# Patient Record
Sex: MALE | Race: WHITE | Employment: OTHER | URBAN - NONMETROPOLITAN AREA
[De-identification: names, ages, dates, MRNs, and addresses within clinical notes are randomized per-mention and may not be internally consistent; named-entity substitution may affect disease eponyms.]

---

## 2018-06-08 ENCOUNTER — HOSPITAL ENCOUNTER (OUTPATIENT)
Dept: PREADMISSION TESTING | Age: 68
Discharge: HOME | End: 2018-06-12
Attending: ORTHOPAEDIC SURGERY
Payer: MEDICARE

## 2018-06-08 VITALS
WEIGHT: 222.2 LBS | TEMPERATURE: 97.3 F | SYSTOLIC BLOOD PRESSURE: 152 MMHG | OXYGEN SATURATION: 96 % | BODY MASS INDEX: 34.88 KG/M2 | HEIGHT: 67 IN | HEART RATE: 100 BPM | RESPIRATION RATE: 20 BRPM | DIASTOLIC BLOOD PRESSURE: 79 MMHG

## 2018-06-08 LAB
ABSOLUTE EOS #: 0.26 K/UL (ref 0–0.44)
ABSOLUTE IMMATURE GRANULOCYTE: 0.06 K/UL (ref 0–0.3)
ABSOLUTE LYMPH #: 2.24 K/UL (ref 1.1–3.7)
ABSOLUTE MONO #: 0.96 K/UL (ref 0.1–1.2)
ANION GAP SERPL CALCULATED.3IONS-SCNC: 10 MMOL/L (ref 9–17)
BASOPHILS # BLD: 1 % (ref 0–2)
BASOPHILS ABSOLUTE: 0.09 K/UL (ref 0–0.2)
BUN BLDV-MCNC: 14 MG/DL (ref 8–23)
BUN/CREAT BLD: 18 (ref 9–20)
CALCIUM SERPL-MCNC: 9.7 MG/DL (ref 8.6–10.4)
CHLORIDE BLD-SCNC: 103 MMOL/L (ref 98–107)
CO2: 28 MMOL/L (ref 20–31)
CREAT SERPL-MCNC: 0.8 MG/DL (ref 0.7–1.2)
DIFFERENTIAL TYPE: ABNORMAL
EKG ATRIAL RATE: 80 BPM
EKG P AXIS: 74 DEGREES
EKG P-R INTERVAL: 134 MS
EKG Q-T INTERVAL: 360 MS
EKG QRS DURATION: 86 MS
EKG QTC CALCULATION (BAZETT): 415 MS
EKG R AXIS: 47 DEGREES
EKG T AXIS: 60 DEGREES
EKG VENTRICULAR RATE: 80 BPM
EOSINOPHILS RELATIVE PERCENT: 2 % (ref 1–4)
GFR AFRICAN AMERICAN: >60 ML/MIN
GFR NON-AFRICAN AMERICAN: >60 ML/MIN
GFR SERPL CREATININE-BSD FRML MDRD: ABNORMAL ML/MIN/{1.73_M2}
GFR SERPL CREATININE-BSD FRML MDRD: ABNORMAL ML/MIN/{1.73_M2}
GLUCOSE BLD-MCNC: 164 MG/DL (ref 70–99)
HCT VFR BLD CALC: 42.5 % (ref 40.7–50.3)
HEMOGLOBIN: 13.6 G/DL (ref 13–17)
IMMATURE GRANULOCYTES: 1 %
LYMPHOCYTES # BLD: 19 % (ref 24–43)
MCH RBC QN AUTO: 31.3 PG (ref 25.2–33.5)
MCHC RBC AUTO-ENTMCNC: 32 G/DL (ref 28.4–34.8)
MCV RBC AUTO: 97.7 FL (ref 82.6–102.9)
MONOCYTES # BLD: 8 % (ref 3–12)
MRSA, DNA, NASAL: NORMAL
NRBC AUTOMATED: 0.3 PER 100 WBC
PDW BLD-RTO: 14.6 % (ref 11.8–14.4)
PLATELET # BLD: 311 K/UL (ref 138–453)
PLATELET ESTIMATE: ABNORMAL
PMV BLD AUTO: 10 FL (ref 8.1–13.5)
POTASSIUM SERPL-SCNC: 4.9 MMOL/L (ref 3.7–5.3)
RBC # BLD: 4.35 M/UL (ref 4.21–5.77)
RBC # BLD: ABNORMAL 10*6/UL
SEG NEUTROPHILS: 69 % (ref 36–65)
SEGMENTED NEUTROPHILS ABSOLUTE COUNT: 8.12 K/UL (ref 1.5–8.1)
SODIUM BLD-SCNC: 141 MMOL/L (ref 135–144)
SPECIMEN DESCRIPTION: NORMAL
WBC # BLD: 11.7 K/UL (ref 3.5–11.3)
WBC # BLD: ABNORMAL 10*3/UL

## 2018-06-08 PROCEDURE — 87641 MR-STAPH DNA AMP PROBE: CPT

## 2018-06-08 PROCEDURE — 93005 ELECTROCARDIOGRAM TRACING: CPT

## 2018-06-08 PROCEDURE — 36415 COLL VENOUS BLD VENIPUNCTURE: CPT

## 2018-06-08 PROCEDURE — 85025 COMPLETE CBC W/AUTO DIFF WBC: CPT

## 2018-06-08 PROCEDURE — 80048 BASIC METABOLIC PNL TOTAL CA: CPT

## 2018-07-10 ENCOUNTER — HOSPITAL ENCOUNTER (OUTPATIENT)
Dept: PHYSICAL THERAPY | Age: 68
Setting detail: THERAPIES SERIES
Discharge: HOME | End: 2018-07-10
Payer: MEDICARE

## 2018-07-10 ENCOUNTER — HOSPITAL ENCOUNTER (OUTPATIENT)
Dept: LAB | Age: 68
Discharge: HOME | DRG: 470 | End: 2018-07-10
Payer: MEDICARE

## 2018-07-10 LAB
ABO/RH: NORMAL
ANTIBODY SCREEN: NEGATIVE
ARM BAND NUMBER: NORMAL
EXPIRATION DATE: NORMAL

## 2018-07-10 PROCEDURE — G8979 MOBILITY GOAL STATUS: HCPCS

## 2018-07-10 PROCEDURE — 86901 BLOOD TYPING SEROLOGIC RH(D): CPT

## 2018-07-10 PROCEDURE — 86900 BLOOD TYPING SEROLOGIC ABO: CPT

## 2018-07-10 PROCEDURE — 86850 RBC ANTIBODY SCREEN: CPT

## 2018-07-10 PROCEDURE — 97116 GAIT TRAINING THERAPY: CPT

## 2018-07-10 PROCEDURE — G8980 MOBILITY D/C STATUS: HCPCS

## 2018-07-10 PROCEDURE — G8978 MOBILITY CURRENT STATUS: HCPCS

## 2018-07-10 PROCEDURE — 36415 COLL VENOUS BLD VENIPUNCTURE: CPT

## 2018-07-10 PROCEDURE — 97110 THERAPEUTIC EXERCISES: CPT

## 2018-07-10 NOTE — PROGRESS NOTES
Phone: [phone]        Fax: [phone] [facility]  Physical Therapy  Gait Training/Discharge Summary  Date: 7/10/2018    Patient Name: Coleman Lozada          : 1950  (88 y.o.)    Attending Physician: Deacon Nuno MD    Diagnosis: Primary osteoarthritis of left knee (M17.12)    Reason for Referral:  [] Crutch Training   [x] Scharlene Minors Training   [] Other:    Objective Assessment:    [x]  Strength of uninvolved extremities are all within functional limits   []  Other:       Weight Bearing Status:  []Right Extremity  [x]Left Extremity  []NWB  []PWB    [x]WBAT  []TTWB    Treatment:  [x] Instructed in appropriate gait with crutches/walker  [x]   Crutches/walker fitted to patient at accurate height  [x] Instructed on gait training at level ground  [x] Instructed on gait training with use of stairs  [x] Instructed on sit to stand utilizing crutches/walker  []   Other:     Home Exercise Program - Instructed Patient:   [x] Handout given regarding LE ROM / strengthening exercises      Treatment Goals:  [x]  Met []  Not Met 7/10/2018   [x] Patient will be independent crutch/walker training    Treatment Plan:   [x]  Gait training, as noted above    [x]  Exercise education, as stated above     Rehab Potential: []  Poor   []  Fair   [x]  Good   []  Excellent     If there are any questions regarding the plan of care, please do not hesitate to contact the center.    Thank you for your referral.      Therapists Signature:  Savita Kaye PT, DPT                              Date: 7/10/2018        To be completed by the referring physicians   [] Approve the treatment plan as indicated   []  Comments:   Physicians Signature:                        Date: 7/10/2018

## 2018-07-11 ENCOUNTER — ANESTHESIA EVENT (OUTPATIENT)
Dept: OPERATING ROOM | Age: 68
DRG: 470 | End: 2018-07-11
Payer: MEDICARE

## 2018-07-12 ENCOUNTER — HOSPITAL ENCOUNTER (INPATIENT)
Age: 68
LOS: 1 days | Discharge: HOME | DRG: 470 | End: 2018-07-13
Attending: ORTHOPAEDIC SURGERY | Admitting: ORTHOPAEDIC SURGERY
Payer: MEDICARE

## 2018-07-12 ENCOUNTER — ANESTHESIA (OUTPATIENT)
Dept: OPERATING ROOM | Age: 68
DRG: 470 | End: 2018-07-12
Payer: MEDICARE

## 2018-07-12 VITALS — SYSTOLIC BLOOD PRESSURE: 110 MMHG | DIASTOLIC BLOOD PRESSURE: 63 MMHG | OXYGEN SATURATION: 93 % | TEMPERATURE: 98 F

## 2018-07-12 DIAGNOSIS — Z96.651 STATUS POST TOTAL KNEE REPLACEMENT, RIGHT: Primary | ICD-10-CM

## 2018-07-12 PROBLEM — M17.12 PRIMARY OSTEOARTHRITIS OF LEFT KNEE: Status: ACTIVE | Noted: 2018-07-12

## 2018-07-12 PROCEDURE — 6370000000 HC RX 637 (ALT 250 FOR IP): Performed by: ORTHOPAEDIC SURGERY

## 2018-07-12 PROCEDURE — 2500000003 HC RX 250 WO HCPCS: Performed by: NURSE ANESTHETIST, CERTIFIED REGISTERED

## 2018-07-12 PROCEDURE — 97116 GAIT TRAINING THERAPY: CPT

## 2018-07-12 PROCEDURE — 2580000003 HC RX 258: Performed by: ORTHOPAEDIC SURGERY

## 2018-07-12 PROCEDURE — 3700000001 HC ADD 15 MINUTES (ANESTHESIA): Performed by: ORTHOPAEDIC SURGERY

## 2018-07-12 PROCEDURE — 3600000005 HC SURGERY LEVEL 5 BASE: Performed by: ORTHOPAEDIC SURGERY

## 2018-07-12 PROCEDURE — 3600000015 HC SURGERY LEVEL 5 ADDTL 15MIN: Performed by: ORTHOPAEDIC SURGERY

## 2018-07-12 PROCEDURE — 2580000003 HC RX 258: Performed by: NURSE ANESTHETIST, CERTIFIED REGISTERED

## 2018-07-12 PROCEDURE — 6360000002 HC RX W HCPCS: Performed by: ORTHOPAEDIC SURGERY

## 2018-07-12 PROCEDURE — 7100000000 HC PACU RECOVERY - FIRST 15 MIN: Performed by: ORTHOPAEDIC SURGERY

## 2018-07-12 PROCEDURE — G8978 MOBILITY CURRENT STATUS: HCPCS

## 2018-07-12 PROCEDURE — 6360000002 HC RX W HCPCS: Performed by: NURSE ANESTHETIST, CERTIFIED REGISTERED

## 2018-07-12 PROCEDURE — 2500000003 HC RX 250 WO HCPCS: Performed by: ORTHOPAEDIC SURGERY

## 2018-07-12 PROCEDURE — 7100000001 HC PACU RECOVERY - ADDTL 15 MIN: Performed by: ORTHOPAEDIC SURGERY

## 2018-07-12 PROCEDURE — 1200000000 HC SEMI PRIVATE

## 2018-07-12 PROCEDURE — G8979 MOBILITY GOAL STATUS: HCPCS

## 2018-07-12 PROCEDURE — 3700000000 HC ANESTHESIA ATTENDED CARE: Performed by: ORTHOPAEDIC SURGERY

## 2018-07-12 PROCEDURE — C1713 ANCHOR/SCREW BN/BN,TIS/BN: HCPCS | Performed by: ORTHOPAEDIC SURGERY

## 2018-07-12 PROCEDURE — 97161 PT EVAL LOW COMPLEX 20 MIN: CPT

## 2018-07-12 PROCEDURE — C1776 JOINT DEVICE (IMPLANTABLE): HCPCS | Performed by: ORTHOPAEDIC SURGERY

## 2018-07-12 PROCEDURE — 0SRD0J9 REPLACEMENT OF LEFT KNEE JOINT WITH SYNTHETIC SUBSTITUTE, CEMENTED, OPEN APPROACH: ICD-10-PCS | Performed by: ORTHOPAEDIC SURGERY

## 2018-07-12 DEVICE — IMPLANTABLE DEVICE: Type: IMPLANTABLE DEVICE | Site: KNEE | Status: FUNCTIONAL

## 2018-07-12 DEVICE — DISCONTINUED USE 416978 CEMENT PALACOS R SING DOSE 40GR: Type: IMPLANTABLE DEVICE | Site: KNEE | Status: FUNCTIONAL

## 2018-07-12 DEVICE — DISCONTINUED USE 415964 CEMENT PALACOS R + G SING DOSE 40GR: Type: IMPLANTABLE DEVICE | Site: KNEE | Status: FUNCTIONAL

## 2018-07-12 DEVICE — IMPL KNEE PSN FEM CR CMT CCR STD SZ9 L: Type: IMPLANTABLE DEVICE | Site: KNEE | Status: FUNCTIONAL

## 2018-07-12 DEVICE — DUP USE 339820 IMPL KNEE PSN TIB STM 5 DEG SZ G L: Type: IMPLANTABLE DEVICE | Site: KNEE | Status: FUNCTIONAL

## 2018-07-12 RX ORDER — DIMENHYDRINATE 50 MG/1
50 TABLET ORAL ONCE
Status: COMPLETED | OUTPATIENT
Start: 2018-07-12 | End: 2018-07-12

## 2018-07-12 RX ORDER — MELOXICAM 15 MG/1
15 TABLET ORAL DAILY
Status: DISCONTINUED | OUTPATIENT
Start: 2018-07-13 | End: 2018-07-13 | Stop reason: HOSPADM

## 2018-07-12 RX ORDER — ACETAMINOPHEN 325 MG/1
650 TABLET ORAL EVERY 6 HOURS
Status: DISCONTINUED | OUTPATIENT
Start: 2018-07-12 | End: 2018-07-13 | Stop reason: HOSPADM

## 2018-07-12 RX ORDER — ASPIRIN 81 MG/1
81 TABLET, CHEWABLE ORAL 2 TIMES DAILY
Status: DISCONTINUED | OUTPATIENT
Start: 2018-07-12 | End: 2018-07-13 | Stop reason: HOSPADM

## 2018-07-12 RX ORDER — PREGABALIN 75 MG/1
75 CAPSULE ORAL ONCE
Status: COMPLETED | OUTPATIENT
Start: 2018-07-12 | End: 2018-07-12

## 2018-07-12 RX ORDER — SODIUM CHLORIDE 0.9 % (FLUSH) 0.9 %
10 SYRINGE (ML) INJECTION EVERY 12 HOURS SCHEDULED
Status: DISCONTINUED | OUTPATIENT
Start: 2018-07-12 | End: 2018-07-13 | Stop reason: HOSPADM

## 2018-07-12 RX ORDER — SODIUM CHLORIDE, SODIUM LACTATE, POTASSIUM CHLORIDE, CALCIUM CHLORIDE 600; 310; 30; 20 MG/100ML; MG/100ML; MG/100ML; MG/100ML
INJECTION, SOLUTION INTRAVENOUS CONTINUOUS
Status: DISCONTINUED | OUTPATIENT
Start: 2018-07-12 | End: 2018-07-12

## 2018-07-12 RX ORDER — DOCUSATE SODIUM 100 MG/1
100 CAPSULE, LIQUID FILLED ORAL 2 TIMES DAILY
Status: DISCONTINUED | OUTPATIENT
Start: 2018-07-12 | End: 2018-07-13 | Stop reason: HOSPADM

## 2018-07-12 RX ORDER — PREGABALIN 75 MG/1
75 CAPSULE ORAL 2 TIMES DAILY
Status: DISCONTINUED | OUTPATIENT
Start: 2018-07-12 | End: 2018-07-13 | Stop reason: HOSPADM

## 2018-07-12 RX ORDER — TRANEXAMIC ACID 650 1/1
1300 TABLET ORAL ONCE
Status: COMPLETED | OUTPATIENT
Start: 2018-07-12 | End: 2018-07-12

## 2018-07-12 RX ORDER — OXYCODONE HYDROCHLORIDE 5 MG/1
10 TABLET ORAL EVERY 4 HOURS PRN
Status: DISCONTINUED | OUTPATIENT
Start: 2018-07-12 | End: 2018-07-13 | Stop reason: HOSPADM

## 2018-07-12 RX ORDER — ONDANSETRON 4 MG/1
4 TABLET, ORALLY DISINTEGRATING ORAL EVERY 30 MIN PRN
Status: DISCONTINUED | OUTPATIENT
Start: 2018-07-12 | End: 2018-07-12

## 2018-07-12 RX ORDER — SODIUM CHLORIDE 0.9 % (FLUSH) 0.9 %
10 SYRINGE (ML) INJECTION PRN
Status: DISCONTINUED | OUTPATIENT
Start: 2018-07-12 | End: 2018-07-13 | Stop reason: HOSPADM

## 2018-07-12 RX ORDER — SODIUM CHLORIDE, SODIUM LACTATE, POTASSIUM CHLORIDE, CALCIUM CHLORIDE 600; 310; 30; 20 MG/100ML; MG/100ML; MG/100ML; MG/100ML
INJECTION, SOLUTION INTRAVENOUS CONTINUOUS
Status: DISCONTINUED | OUTPATIENT
Start: 2018-07-12 | End: 2018-07-13 | Stop reason: HOSPADM

## 2018-07-12 RX ORDER — ACETAMINOPHEN 325 MG/1
650 TABLET ORAL ONCE
Status: DISCONTINUED | OUTPATIENT
Start: 2018-07-12 | End: 2018-07-12 | Stop reason: SDUPTHER

## 2018-07-12 RX ORDER — PROPOFOL 10 MG/ML
INJECTION, EMULSION INTRAVENOUS PRN
Status: DISCONTINUED | OUTPATIENT
Start: 2018-07-12 | End: 2018-07-12 | Stop reason: SDUPTHER

## 2018-07-12 RX ORDER — FENTANYL CITRATE 50 UG/ML
50 INJECTION, SOLUTION INTRAMUSCULAR; INTRAVENOUS EVERY 5 MIN PRN
Status: DISCONTINUED | OUTPATIENT
Start: 2018-07-12 | End: 2018-07-12

## 2018-07-12 RX ORDER — FENTANYL CITRATE 50 UG/ML
25 INJECTION, SOLUTION INTRAMUSCULAR; INTRAVENOUS EVERY 5 MIN PRN
Status: DISCONTINUED | OUTPATIENT
Start: 2018-07-12 | End: 2018-07-12

## 2018-07-12 RX ORDER — PROPOFOL 10 MG/ML
INJECTION, EMULSION INTRAVENOUS CONTINUOUS PRN
Status: DISCONTINUED | OUTPATIENT
Start: 2018-07-12 | End: 2018-07-12 | Stop reason: SDUPTHER

## 2018-07-12 RX ORDER — ONDANSETRON 2 MG/ML
4 INJECTION INTRAMUSCULAR; INTRAVENOUS EVERY 6 HOURS PRN
Status: DISCONTINUED | OUTPATIENT
Start: 2018-07-12 | End: 2018-07-13 | Stop reason: HOSPADM

## 2018-07-12 RX ORDER — 0.9 % SODIUM CHLORIDE 0.9 %
500 INTRAVENOUS SOLUTION INTRAVENOUS
Status: DISCONTINUED | OUTPATIENT
Start: 2018-07-12 | End: 2018-07-12

## 2018-07-12 RX ORDER — OXYCODONE HYDROCHLORIDE 5 MG/1
5 TABLET ORAL EVERY 4 HOURS PRN
Status: DISCONTINUED | OUTPATIENT
Start: 2018-07-12 | End: 2018-07-13 | Stop reason: HOSPADM

## 2018-07-12 RX ORDER — ACETAMINOPHEN 325 MG/1
650 TABLET ORAL ONCE
Status: COMPLETED | OUTPATIENT
Start: 2018-07-12 | End: 2018-07-12

## 2018-07-12 RX ORDER — PROMETHAZINE HYDROCHLORIDE 25 MG/ML
6.25 INJECTION, SOLUTION INTRAMUSCULAR; INTRAVENOUS
Status: DISCONTINUED | OUTPATIENT
Start: 2018-07-12 | End: 2018-07-12

## 2018-07-12 RX ORDER — LIDOCAINE HYDROCHLORIDE 20 MG/ML
INJECTION, SOLUTION INFILTRATION; PERINEURAL PRN
Status: DISCONTINUED | OUTPATIENT
Start: 2018-07-12 | End: 2018-07-12 | Stop reason: SDUPTHER

## 2018-07-12 RX ORDER — CELECOXIB 200 MG/1
400 CAPSULE ORAL ONCE
Status: COMPLETED | OUTPATIENT
Start: 2018-07-12 | End: 2018-07-12

## 2018-07-12 RX ORDER — TRANEXAMIC ACID 650 1/1
1300 TABLET ORAL
Status: DISCONTINUED | OUTPATIENT
Start: 2018-07-12 | End: 2018-07-12 | Stop reason: SDUPTHER

## 2018-07-12 RX ORDER — FAMOTIDINE 20 MG/1
20 TABLET, FILM COATED ORAL 2 TIMES DAILY
Status: DISCONTINUED | OUTPATIENT
Start: 2018-07-12 | End: 2018-07-13 | Stop reason: HOSPADM

## 2018-07-12 RX ORDER — ONDANSETRON 2 MG/ML
4 INJECTION INTRAMUSCULAR; INTRAVENOUS
Status: DISCONTINUED | OUTPATIENT
Start: 2018-07-12 | End: 2018-07-12

## 2018-07-12 RX ADMIN — SODIUM CHLORIDE, POTASSIUM CHLORIDE, SODIUM LACTATE AND CALCIUM CHLORIDE: 600; 310; 30; 20 INJECTION, SOLUTION INTRAVENOUS at 11:45

## 2018-07-12 RX ADMIN — PREGABALIN 75 MG: 75 CAPSULE ORAL at 08:33

## 2018-07-12 RX ADMIN — Medication 2 G: at 09:58

## 2018-07-12 RX ADMIN — PREGABALIN 75 MG: 75 CAPSULE ORAL at 20:13

## 2018-07-12 RX ADMIN — ASPIRIN 81 MG 81 MG: 81 TABLET ORAL at 20:13

## 2018-07-12 RX ADMIN — ACETAMINOPHEN 650 MG: 325 TABLET ORAL at 08:33

## 2018-07-12 RX ADMIN — TRANEXAMIC ACID 1300 MG: 650 TABLET ORAL at 08:33

## 2018-07-12 RX ADMIN — TRANEXAMIC ACID 1300 MG: 650 TABLET ORAL at 20:13

## 2018-07-12 RX ADMIN — PHENYLEPHRINE HYDROCHLORIDE 100 MCG: 10 INJECTION INTRAVENOUS at 10:37

## 2018-07-12 RX ADMIN — PROPOFOL 50 MG: 10 INJECTION, EMULSION INTRAVENOUS at 10:13

## 2018-07-12 RX ADMIN — PROPOFOL 100 MCG/KG/MIN: 10 INJECTION, EMULSION INTRAVENOUS at 10:13

## 2018-07-12 RX ADMIN — ACETAMINOPHEN 650 MG: 325 TABLET ORAL at 13:57

## 2018-07-12 RX ADMIN — FAMOTIDINE 20 MG: 20 TABLET ORAL at 20:13

## 2018-07-12 RX ADMIN — SODIUM CHLORIDE, POTASSIUM CHLORIDE, SODIUM LACTATE AND CALCIUM CHLORIDE: 600; 310; 30; 20 INJECTION, SOLUTION INTRAVENOUS at 13:57

## 2018-07-12 RX ADMIN — SODIUM CHLORIDE, POTASSIUM CHLORIDE, SODIUM LACTATE AND CALCIUM CHLORIDE: 600; 310; 30; 20 INJECTION, SOLUTION INTRAVENOUS at 08:55

## 2018-07-12 RX ADMIN — CELECOXIB 400 MG: 200 CAPSULE ORAL at 08:33

## 2018-07-12 RX ADMIN — ACETAMINOPHEN 650 MG: 325 TABLET ORAL at 20:13

## 2018-07-12 RX ADMIN — LIDOCAINE HYDROCHLORIDE 100 MG: 20 INJECTION, SOLUTION INFILTRATION; PERINEURAL at 10:13

## 2018-07-12 RX ADMIN — DIMENHYDRINATE 50 MG: 50 TABLET ORAL at 08:33

## 2018-07-12 RX ADMIN — DOCUSATE SODIUM 100 MG: 100 CAPSULE, LIQUID FILLED ORAL at 20:13

## 2018-07-12 RX ADMIN — PHENYLEPHRINE HYDROCHLORIDE 100 MCG: 10 INJECTION INTRAVENOUS at 11:07

## 2018-07-12 RX ADMIN — CEFAZOLIN SODIUM 2 G: 1 INJECTION, POWDER, FOR SOLUTION INTRAMUSCULAR; INTRAVENOUS at 17:32

## 2018-07-12 RX ADMIN — PHENYLEPHRINE HYDROCHLORIDE 100 MCG: 10 INJECTION INTRAVENOUS at 10:54

## 2018-07-12 ASSESSMENT — PULMONARY FUNCTION TESTS
PIF_VALUE: 1
PIF_VALUE: 0
PIF_VALUE: 1
PIF_VALUE: 2
PIF_VALUE: 1

## 2018-07-12 ASSESSMENT — PAIN SCALES - GENERAL
PAINLEVEL_OUTOF10: 0

## 2018-07-12 ASSESSMENT — PAIN - FUNCTIONAL ASSESSMENT: PAIN_FUNCTIONAL_ASSESSMENT: 0-10

## 2018-07-12 NOTE — ANESTHESIA POSTPROCEDURE EVALUATION
Department of Anesthesiology  Postprocedure Note    Patient: Lesley Culver  MRN: [id number]  YOB: 1950  Date of evaluation: 7/12/2018  Time:  1:07 PM     Procedure Summary     Date:  07/12/18 Room / Location:  [address] AT [facility] OR    Anesthesia Start:  1001 Anesthesia Stop:  8094    Procedure:  KNEE TOTAL ARTHROPLASTY (Left ) Diagnosis:  (PRIMARY OSTEOARTHRITIS LEFT KNEE)    Surgeon: Reyes Bloom MD Responsible Provider:  SHEYLA Lewis CRNA    Anesthesia Type:  spinal ASA Status:  1          Anesthesia Type: spinal    Luisa Phase I: Luias Score: 10    Luisa Phase II:      Last vitals: Reviewed and per EMR flowsheets.        Anesthesia Post Evaluation    Patient location during evaluation: PACU  Patient participation: complete - patient participated  Level of consciousness: awake and alert  Pain score: 0  Airway patency: patent  Nausea & Vomiting: no vomiting and no nausea  Complications: no  Cardiovascular status: hemodynamically stable  Respiratory status: room air and spontaneous ventilation  Hydration status: stable

## 2018-07-12 NOTE — PROGRESS NOTES
Pt was already instructed on incentive spirometer by RN. Pt stated he did not have any questions at this time.

## 2018-07-12 NOTE — ANESTHESIA PRE PROCEDURE
food consumption: 07/11/18    BMI:   Wt Readings from Last 3 Encounters:   07/12/18 222 lb (100.7 kg)   06/08/18 222 lb 3.2 oz (100.8 kg)   10/14/16 210 lb (95.3 kg)     Body mass index is 34.77 kg/m². CBC:   Lab Results   Component Value Date    WBC 11.7 06/08/2018    RBC 4.35 06/08/2018    HGB 13.6 06/08/2018    HCT 42.5 06/08/2018    MCV 97.7 06/08/2018    RDW 14.6 06/08/2018     06/08/2018       CMP:   Lab Results   Component Value Date     06/08/2018    K 4.9 06/08/2018     06/08/2018    CO2 28 06/08/2018    BUN 14 06/08/2018    CREATININE 0.80 06/08/2018    GFRAA >60 06/08/2018    LABGLOM >60 06/08/2018    GLUCOSE 164 06/08/2018    GLUCOSE 101 11/03/2011    PROT 6.6 03/24/2016    CALCIUM 9.7 06/08/2018    BILITOT 0.87 03/24/2016    ALKPHOS 81 03/24/2016    AST 22 03/24/2016    ALT 33 03/24/2016       POC Tests: No results for input(s): POCGLU, POCNA, POCK, POCCL, POCBUN, POCHEMO, POCHCT in the last 72 hours. Coags: No results found for: PROTIME, INR, APTT    HCG (If Applicable): No results found for: PREGTESTUR, PREGSERUM, HCG, HCGQUANT     ABGs: No results found for: PHART, PO2ART, BZH9QVS, QHR1NUM, BEART, Q0PEYNVL     Type & Screen (If Applicable):  No results found for: [facility]    Anesthesia Evaluation  Patient summary reviewed no history of anesthetic complications:   Airway: Mallampati: II  TM distance: >3 FB   Neck ROM: full  Mouth opening: > = 3 FB Dental:          Pulmonary:normal exam                               Cardiovascular:  Exercise tolerance: good (>4 METS),            Beta Blocker:  Not on Beta Blocker         Neuro/Psych:               GI/Hepatic/Renal:        (-) GERD       Endo/Other:                     Abdominal:   (+) obese,         Vascular:                                        Anesthesia Plan      spinal     ASA 1       Induction: intravenous. MIPS: Postoperative opioids intended and Prophylactic antiemetics administered.   Anesthetic plan and risks

## 2018-07-12 NOTE — OP NOTE
Preoperative diagnosis: Left knee DJD    Postoperative diagnosis:Same    Procedure:  Left total knee arthroplasty    Components:  Ray persona components including a size 9 CR left femur, size G left tibia with a 13 mm CONGRUENT polyethylene insert and 35 mm patellar resurfacing    Surgeon:   Hiwot Chilel M.D. Anesthesia:  Spinal    Estimated blood loss: 50 mL    Drains: 1/8 inch Hemovac    Complications:  None    Indications:  Patient has had long-standing problems and difficulties with respect to the left knee. After failure of conservative measures and discussion of risks as well as benefits the patient did request to proceed with operative intervention for left total knee arthroplasty. They did understand the risks as well as benefits including but not limited to infection, stiffness, wound healing problems, nerve, artery, and vein injury as well as possible need for further surgeries. Procedure: The patient was taken to the operating room where they were placed onto the operating room table. A spinal anesthetic was then placed per the anesthesiologist.  After adequate induction of spinal anesthesia a well-padded tourniquet was then placed to the left upper thigh. The left lower extremity was then prepped and draped in the usual sterile fashion. After sterile prep, drape, and gown we then proceeded with timeout. After timeout was performed the left lower extremity was then exsanguinated followed by inflation of the tourniquet to 300 mmHg. An anterior incision was then made medial to the patella. We then dissected through the skin through the subcutaneous tissue and a limited medial parapatellar arthrotomy was then performed. After this we were able to inspect the knee joint. There was evidence of advanced degeneration present. This did confirm the preoperative diagnosis.   We then proceeded with excision of a portion of the anterior fat pad as well as the meniscal remnants area retention sutures were placed to both sides of the retinaculum and patella resection was performed. The knee was then moved to a flexed position utilizing the leg muñoz. A drill hole was made and intramedullary alignment was utilized using a 5° valgus minimally invasive sidecutting distal femoral resection guide. The cut was then made using standard technique. We then turned her attention to the proximal tibia. 2 mm of bone was resected from the most involved side. After resection of the proximal tibia we then further clear the remainder of all meniscal remnants and placed local anesthetic mixture into the posterior capsule and along the sites of the meniscal resection. We then checked alignment utilizing the extension spacer block and felt to have good stability and balance through the knee with the spacer block in position. We then turned her attention back to the distal femur. Distal femoral sizing jig was placed. Appropriate size was noted for the above components and distal femoral cuts were then made using the appropriate cutting block. After completing cuts the trial components were then placed into position. It was felt that there was good balance through flexion and extension as well as good bone coverage present. Trial components were then removed and bone surfaces were thoroughly cleaned for final preparation and implant insertion. The above components were then cemented into position using standard technique including antibiotic cement. A trial polyethylene spacer was placed and excess cement was removed. The tourniquet was then deflated and hemostasis was obtained during hardening of cement. Local anesthetic including total joint compound was injected into the pericapsular tissues as well as subcutaneous tissue for postoperative pain control. After hardening of cement, cleaning and removal of all excess cement we then again trialed the above polyethylene insert.   It was felt to give good

## 2018-07-12 NOTE — PROGRESS NOTES
than 20 percent impaired, limited or restricted    Goals  Short term goals  Time Frame for Short term goals: 10 visits   Short term goal 1: Pt to ambulate 150ft with RW and no LOB to assist with safety when return to home. Short term goal 2: Pt to tolerate > or = 20 minutes of ther ex to assist with increased strength and endurance for ADL's. Short term goal 3: Pt to demonstrate good standing balance with RW to assist with decreased fall risk. Short term goal 4: Pt to ascend/ descend 2 steps to assist with community ambulation.        Therapy Time   Individual Concurrent Group Co-treatment   Time In 1450         Time Out 1515         Minutes [address], [place]

## 2018-07-13 VITALS
BODY MASS INDEX: 34.84 KG/M2 | TEMPERATURE: 96.7 F | OXYGEN SATURATION: 98 % | DIASTOLIC BLOOD PRESSURE: 73 MMHG | SYSTOLIC BLOOD PRESSURE: 124 MMHG | HEIGHT: 67 IN | HEART RATE: 65 BPM | RESPIRATION RATE: 16 BRPM | WEIGHT: 222 LBS

## 2018-07-13 LAB
HCT VFR BLD CALC: 34.7 % (ref 40.7–50.3)
HEMOGLOBIN: 11.3 G/DL (ref 13–17)

## 2018-07-13 PROCEDURE — 97116 GAIT TRAINING THERAPY: CPT

## 2018-07-13 PROCEDURE — 97166 OT EVAL MOD COMPLEX 45 MIN: CPT

## 2018-07-13 PROCEDURE — 90670 PCV13 VACCINE IM: CPT | Performed by: ORTHOPAEDIC SURGERY

## 2018-07-13 PROCEDURE — G8988 SELF CARE GOAL STATUS: HCPCS

## 2018-07-13 PROCEDURE — 97535 SELF CARE MNGMENT TRAINING: CPT

## 2018-07-13 PROCEDURE — 85014 HEMATOCRIT: CPT

## 2018-07-13 PROCEDURE — 6370000000 HC RX 637 (ALT 250 FOR IP): Performed by: ORTHOPAEDIC SURGERY

## 2018-07-13 PROCEDURE — 97530 THERAPEUTIC ACTIVITIES: CPT

## 2018-07-13 PROCEDURE — 6360000002 HC RX W HCPCS: Performed by: ORTHOPAEDIC SURGERY

## 2018-07-13 PROCEDURE — G0009 ADMIN PNEUMOCOCCAL VACCINE: HCPCS | Performed by: ORTHOPAEDIC SURGERY

## 2018-07-13 PROCEDURE — 2580000003 HC RX 258: Performed by: ORTHOPAEDIC SURGERY

## 2018-07-13 PROCEDURE — 36415 COLL VENOUS BLD VENIPUNCTURE: CPT

## 2018-07-13 PROCEDURE — G8987 SELF CARE CURRENT STATUS: HCPCS

## 2018-07-13 PROCEDURE — 97110 THERAPEUTIC EXERCISES: CPT

## 2018-07-13 PROCEDURE — 85018 HEMOGLOBIN: CPT

## 2018-07-13 RX ORDER — ASPIRIN 81 MG/1
81 TABLET, CHEWABLE ORAL 2 TIMES DAILY
Qty: 30 TABLET | Refills: 3 | Status: ON HOLD | OUTPATIENT
Start: 2018-07-13 | End: 2021-05-14 | Stop reason: ALTCHOICE

## 2018-07-13 RX ORDER — OXYCODONE HYDROCHLORIDE AND ACETAMINOPHEN 5; 325 MG/1; MG/1
1 TABLET ORAL EVERY 6 HOURS PRN
Qty: 28 TABLET | Refills: 0 | Status: SHIPPED | OUTPATIENT
Start: 2018-07-13 | End: 2018-07-20

## 2018-07-13 RX ADMIN — SODIUM CHLORIDE, POTASSIUM CHLORIDE, SODIUM LACTATE AND CALCIUM CHLORIDE: 600; 310; 30; 20 INJECTION, SOLUTION INTRAVENOUS at 07:05

## 2018-07-13 RX ADMIN — ACETAMINOPHEN 650 MG: 325 TABLET ORAL at 08:05

## 2018-07-13 RX ADMIN — PREGABALIN 75 MG: 75 CAPSULE ORAL at 08:04

## 2018-07-13 RX ADMIN — MELOXICAM 15 MG: 15 TABLET ORAL at 08:05

## 2018-07-13 RX ADMIN — ASPIRIN 81 MG 81 MG: 81 TABLET ORAL at 08:04

## 2018-07-13 RX ADMIN — CEFAZOLIN SODIUM 2 G: 1 INJECTION, POWDER, FOR SOLUTION INTRAMUSCULAR; INTRAVENOUS at 01:24

## 2018-07-13 RX ADMIN — FAMOTIDINE 20 MG: 20 TABLET ORAL at 08:05

## 2018-07-13 RX ADMIN — PNEUMOCOCCAL 13-VALENT CONJUGATE VACCINE 0.5 ML: 2.2; 2.2; 2.2; 2.2; 2.2; 4.4; 2.2; 2.2; 2.2; 2.2; 2.2; 2.2; 2.2 INJECTION, SUSPENSION INTRAMUSCULAR at 13:15

## 2018-07-13 RX ADMIN — DOCUSATE SODIUM 100 MG: 100 CAPSULE, LIQUID FILLED ORAL at 08:04

## 2018-07-13 ASSESSMENT — PAIN SCALES - GENERAL: PAINLEVEL_OUTOF10: 0

## 2018-07-13 NOTE — PROGRESS NOTES
Physical Therapy  Facility/Department: [facility] AT THE [facility] MED SURG  Daily Treatment Note  NAME: Asia Morgan  : 1950  MRN: [id number]    Date of Service: 2018    Discharge Recommendations:  Home with assist PRN        Patient Diagnosis(es): There were no encounter diagnoses. has a past medical history of Arthritis and Hyperlipidemia. has a past surgical history that includes joint replacement (Left, 2018) and pr total knee arthroplasty (Left, 2018). Restrictions  Restrictions/Precautions  Restrictions/Precautions: General Precautions, Fall Risk, Weight Bearing  Lower Extremity Weight Bearing Restrictions  Left Lower Extremity Weight Bearing: Weight Bearing As Tolerated  Subjective   General  Chart Reviewed: Yes  Referring Practitioner: Aneesh Alonzo MD   Subjective  Subjective: Pt reported 1/10 L knee pain. General Comment  Comments: Nursing aware of pain level. Orientation  Orientation  Overall Orientation Status: Within Normal Limits  Objective      Transfers  Sit to Stand: Contact guard assistance  Stand to sit: Contact guard assistance  Ambulation  Ambulation?: Yes  Ambulation 1  Surface: level tile  Device: Rolling Walker  Assistance: Contact guard assistance  Distance: 100 feet x 1, 50 feet x 1  Comments: Verbal cues for increased step length and safety with turns. Pt with good understanding. Stairs/Curb  Stairs?: Yes  Stairs  # Steps : 6  Rails: Bilateral  Device: No Device  Assistance: Contact guard assistance  Comment: Miniaml cues needed for technique. Pt with good understanding. Balance  Posture: Fair  Sitting - Static: Good  Sitting - Dynamic: Good  Standing - Static: Fair;+  Standing - Dynamic: Fair  Exercises  Straight Leg Raise: 15x  Quad Sets: 15x  Heelslides: 15x  Gluteal Sets: 15x  Hip Flexion: 15x  Hip Abduction: 15x  Knee Short Arc Quad: 15x  Ankle Pumps: 15x  Comments: Above exercises completed in supine.        Assessment      Patient Education: Educated pt on safety with transfers/amb. Pt with good understanding. Also reviewed discharge folde with pt verbalizing good understanding. REQUIRES PT FOLLOW UP: Yes  Activity Tolerance  Activity Tolerance: Patient Tolerated treatment well     G-Code     OutComes Score    AM-PAC Score    Goals  Short term goals  Time Frame for Short term goals: 10 visits   Short term goal 1: Pt to ambulate 150ft with RW and no LOB to assist with safety when return to home. Short term goal 2: Pt to tolerate > or = 20 minutes of ther ex to assist with increased strength and endurance for ADL's. Short term goal 3: Pt to demonstrate good standing balance with RW to assist with decreased fall risk. Short term goal 4: Pt to ascend/ descend 2 steps to assist with community ambulation. Plan    Plan  Times per week: 7   Times per day: Twice a day  Current Treatment Recommendations: Strengthening, ROM, Balance Training, Functional Mobility Training, Stair training, Safety Education & Training, ADL/Self-care Training, Transfer Training, Endurance Training, Neuromuscular Re-education, Patient/Caregiver Education & Training  Safety Devices  Type of devices:  All fall risk precautions in place, Left in chair, Call light within reach, Nurse notified, Gait belt (No alarm upon entry. )     Therapy Time   Individual Concurrent Group Co-treatment   Time In 1012         Time Out 9025 [id number] [place], [place]

## 2018-07-13 NOTE — DISCHARGE SUMMARY
Orthopedic Discharge Summary      Patient ID:  Deisy Palacios  [id number]  79 y.o.  1950    Admit date: 7/12/2018    Discharge date and time: 7/13/2018    Admitting Physician: Manpreet Akins MD    Discharge Physician: Manpreet Akins MD    Admission Diagnoses: Primary osteoarthritis of left knee [M17.12]    Discharge Diagnoses: Status post left total knee arthroplasty    Admission Condition: good    Discharged Condition: good    Indication for Admission: Patient is a 70-year-old male with advanced bilateral knee DJD. After failure of conservative measures and discussion of risks as well as benefits he did request to proceed with operative intervention for left total knee arthroplasty. Surgical procedure: Left total knee arthroplasty utilizing Ray persona components    Clinical Course: Patient was admitted to the hospital on July 12, 2018. He underwent left total knee arthroplasty on that day under spinal anesthetic. Patient tolerated the procedure without difficulty. Postoperatively he did have a hematoma defect drain which was left in overnight. He was ambulating well and had good pain control with respect to the left lower extremity. Discharge to home postoperative day #1. Physical Exam: On examination the left lower extremity is grossly neurovascularly intact. Quadriceps and extensor mechanism remained intact. No evidence of significant drainage at the incision line. There is no palpable tenderness to the calf and no palpable cords present.       Vitals:  Vitals:    07/13/18 0645   BP: 124/73   Pulse: 65   Resp: 16   Temp: 96.7 °F (35.9 °C)   SpO2: 98%      Weight: 222 lb (100.7 kg)    Height: 5' 7\" (170.2 cm)     Significant Diagnostic Studies:   · Discharge Labs:  Lab Results   Component Value Date    WBC 11.7 (H) 06/08/2018    HGB 11.3 (L) 07/13/2018    MCV 97.7 06/08/2018     06/08/2018      Lab Results   Component Value Date    GLUCOSE 164 (H) 06/08/2018    BUN 14 06/08/2018 CREATININE 0.80 06/08/2018     06/08/2018    K 4.9 06/08/2018    CALCIUM 9.7 06/08/2018     06/08/2018    CO2 28 06/08/2018       Consults: general medical     Treatments: analgesia, therapies: PT OT,  and surgery    Disposition: home    Patient Instructions:   See instruction sheet. Renny Aragon   Home Medication Instructions KRIS:[id number]    Printed on:07/13/18 1240   Medication Information                      aspirin 81 MG chewable tablet  Take 1 tablet by mouth 2 times daily             oxyCODONE-acetaminophen (PERCOCET) 5-325 MG per tablet  Take 1 tablet by mouth every 6 hours as needed for Pain for up to 7 days. Intended supply: 7 days. Take lowest dose possible to manage pain. Follow-up in 2 weeks. Signed:   Deacon Nuno  7/13/2018  12:42 PM

## 2018-07-13 NOTE — PROGRESS NOTES
Nutrition Assessment    Type and Reason for Visit: Initial    Nutrition Recommendations:  Continue current nutrition plan    Malnutrition Assessment:  · Malnutrition Status: No malnutrition    Nutrition Diagnosis:   · Problem: Increased nutrient needs  · Etiology: related to Acute injury/trauma     Signs and symptoms:  as evidenced by Presence of wounds (surgical healing)    Nutrition Assessment:  · Subjective Assessment: No concerns. Feels good and hopes to go home later today. Weight gains with inactivity. · Nutrition-Focused Physical Findings: obese  · Wound Type: Surgical Wound  · Current Nutrition Therapies:  · Oral Diet Orders: General   · Oral Diet intake: %  · Oral Nutrition Supplement (ONS) Orders: None  · Anthropometric Measures:  · Ht: 5' 7\" (170.2 cm)   · Current Body Wt: 222 lb (100.7 kg)  · Admission Body Wt: 222 lb (100.7 kg)  · BMI Classification: BMI 30.0 - 34.9 Obese Class I    Lab Results   Component Value Date     06/08/2018    K 4.9 06/08/2018     06/08/2018    CO2 28 06/08/2018    BUN 14 06/08/2018    CREATININE 0.80 06/08/2018    GLUCOSE 164 (H) 06/08/2018    CALCIUM 9.7 06/08/2018    PROT 6.6 03/24/2016    LABALBU 4.1 03/24/2016    BILITOT 0.87 03/24/2016    ALKPHOS 81 03/24/2016    AST 22 03/24/2016    ALT 33 03/24/2016    LABGLOM >60 06/08/2018    GFRAA >60 06/08/2018     No results found for: LABA1C  No results found for: EAG  No results found for: VITD25    Estimated Intake vs Estimated Needs: Intake Meets Needs    Nutrition Risk Level: Low    Taking PO well to aid with healing process. Suspect glucose elevations r/t stress and obesity. Could consider checking A1C, given his weight increases over time, which may be impacting insulin sensitivity.       Nutrition Interventions:   Continue current diet  Continued Inpatient Monitoring, Coordination of Care, Education Not Indicated    Nutrition Evaluation:   · Evaluation: Goals set   · Goals: PO >75% meals

## 2018-07-16 ENCOUNTER — HOSPITAL ENCOUNTER (OUTPATIENT)
Dept: PHYSICAL THERAPY | Age: 68
Setting detail: THERAPIES SERIES
Discharge: HOME | End: 2018-07-16
Payer: MEDICARE

## 2018-07-16 PROCEDURE — G8979 MOBILITY GOAL STATUS: HCPCS

## 2018-07-16 PROCEDURE — 97161 PT EVAL LOW COMPLEX 20 MIN: CPT

## 2018-07-16 PROCEDURE — G0283 ELEC STIM OTHER THAN WOUND: HCPCS

## 2018-07-16 PROCEDURE — G8978 MOBILITY CURRENT STATUS: HCPCS

## 2018-07-16 PROCEDURE — 97110 THERAPEUTIC EXERCISES: CPT

## 2018-07-16 ASSESSMENT — PAIN DESCRIPTION - LOCATION: LOCATION: KNEE

## 2018-07-16 ASSESSMENT — PAIN SCALES - GENERAL: PAINLEVEL_OUTOF10: 8

## 2018-07-16 ASSESSMENT — PAIN DESCRIPTION - PAIN TYPE: TYPE: SURGICAL PAIN

## 2018-07-16 ASSESSMENT — PAIN DESCRIPTION - ORIENTATION: ORIENTATION: LEFT

## 2018-07-16 NOTE — PROGRESS NOTES
Phone: [phone]          Fax: [phone]                      Outpatient Physical Therapy                                                                      Evaluation  Date: 2018  Patient: Nancy iSlverman  : 1950  [facility] #: [id number]  Referring Practitioner: Katherine Beltran MD     Referral Date : 18     Diagnosis: Primary osteosrthritis of left knee (M17.12)     Treatment Diagnosis: L knee pain; difficulty walking   Onset Date: 18  PT Insurance Information: Medicare/Aetna   Total # of Visits Approved: 18   Total # of Visits to Date: 1  No Show: 0  Canceled Appointment: 0     Subjective  Subjective: Pt arrives to PT evaluation s/p L TKA, which pt states was performed on 18. Pt states his pain level upon arrival to PT is 8/10. Pt states his pain level gets up to a 10/10 in the morning due to stiffness and when he is up on his LLE. Pt states he has been sleeping in his recliner at night with his left leg propped up due to his swelling. Pt states last night his swelling was \"bad\" and he was in a lot of pain, but noticed significant decrease in swelling this morning after having leg elevated overnight. Pt states he noticed a little bit more drainage on his bandaging covering the incision which he was a little concerned about. Comments: RTD 18     Pain Screening  Patient Currently in Pain: Yes  Pain Assessment  Pain Assessment: 0-10  Pain Level: 8  Pain Type: Surgical pain  Pain Location: Knee  Pain Orientation: Left          Objective     Observation/Palpation  Observation: Pt ambulating with RW and antalgic gait, with step-to pattern and decreased velocity. Distal 1/2 of bandaging covering incision appears saturated, with small area of what appears to be new drainage. Bandage removed to assess incision, which appears to have dried blood on skin surrounding distal 1/2 of incision.  No active drainage observed at time of Judgment   [] Outcome Measure:     Goals  Short term goals  Time Frame for Short term goals: 3 weeks   Short term goal 1: Pt to initiate HEP. Short term goal 2: Pt to demonstrate L knee flexion PROM to > or = 50* to assist with increased functional mobility. Long term goals  Time Frame for Long term goals : 6 weeks   Long term goal 1: Pt to be compliant and independent with HEP. Long term goal 2: Pt to demonstrate normalized gait pattern without use of AD to assist with return to ambulation in community. Long term goal 3: Pt to demonstrate L knee extension AROM to < or = 2* to assist with normalized gait pattern. Long term goal 4: Pt to demonstrate L knee flexion AROM to > or = 115* to assist with ADL's. Long term goal 5: Pt to demonstrate 4+/5 L knee flexion and extension MMT to assist with increased ability to navigate stairs at home. Patient goals :  \"To walk without pain and a device\"        Minutes Tracking:  Time In: 0512  Time Out: 169 [facility]  Minutes: [address] [facility] [place] Walt Ramirez DPT       7/16/2018

## 2018-07-16 NOTE — PLAN OF CARE
[facility]           Phone: [phone]             Outpatient Physical Therapy  Fax: [phone]                                           Date: 2018  Patient: Timur Sanabria : 1950 CSN #: [id number]   Referring Practitioner: Alec Valencia MD  Referral Date:  18       [] Plan of Care   [] Updated Plan of Care    Dates of Service to Include: 2018 to 18    Diagnosis:  Primary osteosrthritis of left knee (M17.12)     Rehab (Treatment) Diagnosis:  L knee pain; difficulty walking              Onset Date:  18    Attendance  Total # of Visits to Date: 1 No Show: 0 Canceled Appointment: 0    Assessment  Assessment: Pt is a 75 y/o male who presents s/p L TKA performed on 18. Pt demonstrates L knee flexion AROM to 27*, left knee extension AROM lacking 12* from 0*. Pt demonstrated decreased LLE strength and edema in L knee. Pt ambulates with a step to pattern with RW. Distal 1/2 of bandaging covering incision appears saturated, with small area of what appears to be new drainage. Bandage removed to assess incision, which appears to have dried blood on skin surrounding distal 1/2 of incision. No active drainage observed at time of evaluation. At conclusion of eval/treatment this date, covered incision with gauze and compression stockings. Pt instructed to cont monitoring incision and drainage, and also reviewed signs/symptoms of blood clots to monitor for and to go to ED if any signs/symtoms present. Pt will benefit from skilled PT services to address the above impairments and to work toward established functional goals.      [x] Primary Impairment :   G Code:    [x] Mobility         [] Carry        [] Body Position       [] Self Care      [] Other:   Functional Impairment Current:  [] 0%    [] 1-19% [] 20-39% [] 40-59% [x] 60-79%    [] 80-99% [] 100%  Functional Impairment Goal:  [] 0% [x] 1-19% [] 20-39% [] 40-59% [] 60-79%    [] 80-99% [] 100%  G Code Functional Impairment determined by:  [x] Clinical Judgment   [] Outcome Measure:     Goals  Short term goals  Time Frame for Short term goals: 3 weeks   Short term goal 1: Pt to initiate HEP. Short term goal 2: Pt to demonstrate L knee flexion PROM to > or = 50* to assist with increased functional mobility. Long term goals  Time Frame for Long term goals : 6 weeks   Long term goal 1: Pt to be compliant and independent with HEP. Long term goal 2: Pt to demonstrate normalized gait pattern without use of AD to assist with return to ambulation in community. Long term goal 3: Pt to demonstrate L knee extension AROM to < or = 2* to assist with normalized gait pattern. Long term goal 4: Pt to demonstrate L knee flexion AROM to > or = 115* to assist with ADL's. Long term goal 5: Pt to demonstrate 4+/5 L knee flexion and extension MMT to assist with increased ability to navigate stairs at home.      Prognosis  Prognosis: Good    Treatment Plan   Times per week: 3  Plan weeks: 6 weeks   [x]HP/CP      [x]Electrical Stim   [x]Therapeutic Exercise      [x]Gait Training  []Aquatics   []Ultrasound         [x]Patient Education/HEP   [x]Manual Therapy  []Traction    [x]Neuro-juan manuel        [x]Soft Tissue Mobs            []Home TENS  []Iontophoresis    []Orthotic casting/fitting      []Dry Needling             Electronically signed by: Erlinda Willis PT, DPT    Date: 7/16/2018      ______________________________________ Date: 7/16/2018   Physician Signature

## 2018-07-19 ENCOUNTER — HOSPITAL ENCOUNTER (OUTPATIENT)
Dept: PHYSICAL THERAPY | Age: 68
Setting detail: THERAPIES SERIES
Discharge: HOME | End: 2018-07-19
Payer: MEDICARE

## 2018-07-23 ENCOUNTER — HOSPITAL ENCOUNTER (OUTPATIENT)
Dept: PHYSICAL THERAPY | Age: 68
Setting detail: THERAPIES SERIES
Discharge: HOME | End: 2018-07-23
Payer: MEDICARE

## 2018-07-23 PROCEDURE — G0283 ELEC STIM OTHER THAN WOUND: HCPCS

## 2018-07-23 PROCEDURE — 97110 THERAPEUTIC EXERCISES: CPT

## 2018-07-23 NOTE — PROGRESS NOTES
importance of HEP. Pt verbalized/demonstrated good understanding:     [x] Yes         [] No, pt required further clarification. Post Treatment Pain:  5/10      Plan  Times per week: 3  Plan weeks: 6 weeks       Goals  (Total # of Visits to Date: 2)   Short Term Goals - Time Frame for Short term goals: 3 weeks      Short term goal 1: Pt to initiate HEP. -met                                        []Met   []Partially met  []Not met   Short term goal 2: Pt to demonstrate L knee flexion PROM to > or = 50* to assist with increased functional mobility. []Met   []Partially met  []Not met      []Met   []Partially met  []Not met      []Met   []Partially met  []Not met     Long Term Goals - Time Frame for Long term goals : 6 weeks   Long term goal 1: Pt to be compliant and independent with HEP. []Met  []Partially met  []Not met   Long term goal 2: Pt to demonstrate normalized gait pattern without use of AD to assist with return to ambulation in community. []Met  []Partially met  []Not met   Long term goal 3: Pt to demonstrate L knee extension AROM to < or = 2* to assist with normalized gait pattern. []Met  []Partially met  []Not met   Long term goal 4: Pt to demonstrate L knee flexion AROM to > or = 115* to assist with ADL's. []Met  []Partially met  []Not met   Long term goal 5: Pt to demonstrate 4+/5 L knee flexion and extension MMT to assist with increased ability to navigate stairs at home.   []Met  []Partially met  []Not met       Minutes Tracking:  Time In: 1430  Time Out: [address]  Minutes: [address] [place], [place], DPT  Date: 7/23/2018

## 2018-07-25 ENCOUNTER — HOSPITAL ENCOUNTER (OUTPATIENT)
Dept: PHYSICAL THERAPY | Age: 68
Setting detail: THERAPIES SERIES
Discharge: HOME | End: 2018-07-25
Payer: MEDICARE

## 2018-07-25 PROCEDURE — 97110 THERAPEUTIC EXERCISES: CPT

## 2018-07-25 PROCEDURE — G0283 ELEC STIM OTHER THAN WOUND: HCPCS

## 2018-07-25 NOTE — PROGRESS NOTES
tolerance. Tx session ended with IFC w/ cold to decrease edema and post therex soreness in L knee. Pt returns to  tomorrow, 7/26. Will progress as tolerated. Patient Education  Pt educated on exercise rationale and icing at home. Pt verbalized/demonstrated good understanding:     [x] Yes         [] No, pt required further clarification. Post Treatment Pain:  0/10      Plan  Times per week: 3  Plan weeks: 6 weeks       Goals  (Total # of Visits to Date: 3)   Short Term Goals - Time Frame for Short term goals: 3 weeks     Short term goal 1: Pt to initiate HEP. -met                                        [x]Met  []Partially met  []Not met   Short term goal 2: Pt to demonstrate L knee flexion PROM to > or = 50* to assist with increased functional mobility. []Met  []Partially met  []Not met      []Met  []Partially met  []Not met      []Met  []Partially met  []Not met     Long Term Goals - Time Frame for Long term goals : 6 weeks   Long term goal 1: Pt to be compliant and independent with HEP. -MET [x]Met  []Partially met  []Not met   Long term goal 2: Pt to demonstrate normalized gait pattern without use of AD to assist with return to ambulation in community. []Met  []Partially met  []Not met   Long term goal 3: Pt to demonstrate L knee extension AROM to < or = 2* to assist with normalized gait pattern. []Met  []Partially met  []Not met   Long term goal 4: Pt to demonstrate L knee flexion AROM to > or = 115* to assist with ADL's. []Met  []Partially met  []Not met   Long term goal 5: Pt to demonstrate 4+/5 L knee flexion and extension MMT to assist with increased ability to navigate stairs at home.   []Met  []Partially met  []Not met       Minutes Tracking:  Time In: [address] [place] [address]  Time Out: [facility]  Minutes: [place], [place]   Date: 7/25/2018

## 2018-07-27 ENCOUNTER — HOSPITAL ENCOUNTER (OUTPATIENT)
Dept: PHYSICAL THERAPY | Age: 68
Setting detail: THERAPIES SERIES
Discharge: HOME | End: 2018-07-27
Payer: MEDICARE

## 2018-07-27 PROCEDURE — 97110 THERAPEUTIC EXERCISES: CPT

## 2018-07-27 PROCEDURE — G0283 ELEC STIM OTHER THAN WOUND: HCPCS

## 2018-07-27 NOTE — PROGRESS NOTES
started to help with return to even distrubtion of weight through LE's. Pt requires frequent rest breaks due to discomfort and deconditioning. Will continue to progress as tolerated. Patient Education  Pt educated on the importance of proper pillow placement when resting at home to help with gaining his extension ROM. Pt educated on importance of keeping his knee moving and performing his HEP at home frequently. Pt verbalized/demonstrated good understanding:     [x] Yes         [] No, pt required further clarification. Post Treatment Pain:  3/10      Plan  Times per week: 3  Plan weeks: 6 weeks       Goals  (Total # of Visits to Date: 4)   Short Term Goals - Time Frame for Short term goals: 3 weeks     Short term goal 1: Pt to initiate HEP. -met                                        []Met   []Partially met  []Not met   Short term goal 2: Pt to demonstrate L knee flexion PROM to > or = 50* to assist with increased functional mobility. []Met   []Partially met  []Not met      []Met   []Partially met  []Not met      []Met   []Partially met  []Not met     Long Term Goals - Time Frame for Long term goals : 6 weeks   Long term goal 1: Pt to be compliant and independent with HEP. []Met  []Partially met  []Not met   Long term goal 2: Pt to demonstrate normalized gait pattern without use of AD to assist with return to ambulation in community. []Met  []Partially met  []Not met   Long term goal 3: Pt to demonstrate L knee extension AROM to < or = 2* to assist with normalized gait pattern. []Met  []Partially met  []Not met   Long term goal 4: Pt to demonstrate L knee flexion AROM to > or = 115* to assist with ADL's. []Met  []Partially met  []Not met   Long term goal 5: Pt to demonstrate 4+/5 L knee flexion and extension MMT to assist with increased ability to navigate stairs at home.   []Met  []Partially met  []Not met       Minutes Tracking:  Time In: 1426  Time Out: [facility]  Minutes: [address] [place], [place], DPT  Date: 7/27/2018\

## 2018-07-30 ENCOUNTER — HOSPITAL ENCOUNTER (OUTPATIENT)
Dept: PHYSICAL THERAPY | Age: 68
Setting detail: THERAPIES SERIES
Discharge: HOME | End: 2018-07-30
Payer: MEDICARE

## 2018-07-30 PROCEDURE — 97110 THERAPEUTIC EXERCISES: CPT

## 2018-07-30 PROCEDURE — G0283 ELEC STIM OTHER THAN WOUND: HCPCS

## 2018-07-30 NOTE — PROGRESS NOTES
Phone: [facility]           Fax: [phone]                           Outpatient Physical Therapy                                                                            Daily Note    Patient: Sarah Bruno : 1950  CSN #: [id number]   Referring Practitioner: Livia Mariano MD     Referral Date : 18     Date: 2018    Diagnosis: Primary osteosrthritis of left knee (M17.12)   Treatment Diagnosis: L knee pain; difficulty walking     Onset Date: 18  PT Insurance Information: Medicare/Aetna   Total # of Visits Approved: 18 Per Physician Order  Total # of Visits to Date: 5  No Show: 0  Canceled Appointment: 0      Pre-Treatment Pain:  2/10  Subjective: Pt states he had a pretty good weekend, and states pain level currently 2/10. Pt states he has had some minor bleeding at a couple spots on his incision, but has cont to change gauze pads. Exercises:     Exercise 2: SciFit 10 min LV 1.0  Exercise 3: Seated LAQ 2x10  Exercise 4: Supine: quad sets with towel roll under knee 15x hold 5-10 sec, heel prop 2 min hold with weight at ankle to decrease ER, SAQ x15, heel slides with strap 10x10\" hold  Exercise 5: Knee flexion/ext stretch at step 3x20\"        Exercise 8: Standing HR/ HS curls 15-20x  Exercise 9: Standing weight shifts at the counter side to side and forward and backward 1 minute each                Modalities:      Cryotherapy (Minutes\Location): x15 minutes       E-stim (parameters): IFC + CP to L knee x 15 minutes                  Assessment  Assessment: Pt with min drainage at mid-incision and superior aspect of incision, steri strips still intact. Educated pt on proper incision care, and to cont monitoring drainage. Cont to educate pt on avoidance of excessive LE ER while in long sitting/supine position. Weight shifting performed again this date to improved tolerance and confidence with WB through LLE for progression of gait quality.

## 2018-08-01 ENCOUNTER — HOSPITAL ENCOUNTER (OUTPATIENT)
Dept: PHYSICAL THERAPY | Age: 68
Setting detail: THERAPIES SERIES
Discharge: HOME | End: 2018-08-01
Payer: MEDICARE

## 2018-08-01 PROCEDURE — G0283 ELEC STIM OTHER THAN WOUND: HCPCS

## 2018-08-01 PROCEDURE — 97110 THERAPEUTIC EXERCISES: CPT

## 2018-08-01 NOTE — PROGRESS NOTES
Phone: [facility]           Fax: [phone]                           Outpatient Physical Therapy                                                                            Daily Note    Patient: Deon Contreras : 1950  [facility] #: [id number]   Referring Practitioner: Melvin Quinteros MD     Referral Date : 18     Date: 2018    Diagnosis: Primary osteosrthritis of left knee (M17.12)   Treatment Diagnosis: L knee pain; difficulty walking     Onset Date: 18  PT Insurance Information: Medicare/Aetna   Total # of Visits Approved: 18 Per Physician Order  Total # of Visits to Date: 6  No Show: 0  Canceled Appointment: 0      Pre-Treatment Pain:  2/10  Subjective: Pt ambulated into therapy with walker and reports pain is 2/10 in L knee this date. Pt states that he is still having minor bleeding on incision, approximately 1 hour prior to tx session today. Pt reports it hasn't bled since last tx session until today. Exercises:  Exercise 1: HEP: quad sets 5 x 5 sec hold, Heel slides 10x10 sec hold, heel prop 1 minute, ankle pumps   Exercise 2: SciFit 10 min LV 1.0  Exercise 3: Seated LAQ 2x10  Exercise 5: Knee flexion/ext stretch at step 3x20\"  Exercise 6: Slantboard stretch 2x30\" (Half of feet on)  Exercise 8: Standing HR/HS curls 20x ea  Exercise 9: Standing weight shifts at counter side/side , fwd/bwd 1 min ea    Assessment  Assessment: Pt AROM measurements were taken today in supine position per pt goals. L knee AROM flexion = 70* and ext = 3*, meeting both short-term goals. Pt is continuing to have min drainage at superior aspect of incision and min bleeding towards inferior aspect of incision. Pt was educated on care and dressing change. Pt tolerated all exercises this date with only one episode of increased burning sensation in anterior knee when performing standing weightshifts. Will progress as tolerated.     Patient Education  Pt educated on exercise rationale and incision care. Pt verbalized/demonstrated good understanding:     [x] Yes         [] No, pt required further clarification. Post Treatment Pain:  0/10      Plan  Times per week: 3  Plan weeks: 6 weeks       Goals  (Total # of Visits to Date: 6)   Short Term Goals - Time Frame for Short term goals: 3 weeks     Short term goal 1: Pt to initiate HEP. -met                                        [x]Met  []Partially met  []Not met   Short term goal 2: Pt to demonstrate L knee flexion PROM to > or = 50* to assist with increased functional mobility. -MET (AROM)  [x]Met  []Partially met  []Not met      []Met  []Partially met  []Not met      []Met  []Partially met  []Not met     Long Term Goals - Time Frame for Long term goals : 6 weeks   Long term goal 1: Pt to be compliant and independent with HEP. []Met  []Partially met  []Not met   Long term goal 2: Pt to demonstrate normalized gait pattern without use of AD to assist with return to ambulation in community. []Met  []Partially met  []Not met   Long term goal 3: Pt to demonstrate L knee extension AROM to < or = 2* to assist with normalized gait pattern. []Met  []Partially met  []Not met   Long term goal 4: Pt to demonstrate L knee flexion AROM to > or = 115* to assist with ADL's. []Met  []Partially met  []Not met   Long term goal 5: Pt to demonstrate 4+/5 L knee flexion and extension MMT to assist with increased ability to navigate stairs at home.   []Met  []Partially met  []Not met       Minutes Tracking:  Time In: 1300  Time Out: 1400  Minutes: IRWIN [place]   Date: 8/1/2018

## 2018-08-03 ENCOUNTER — HOSPITAL ENCOUNTER (OUTPATIENT)
Dept: PHYSICAL THERAPY | Age: 68
Setting detail: THERAPIES SERIES
Discharge: HOME | End: 2018-08-03
Payer: MEDICARE

## 2018-08-03 PROCEDURE — G0283 ELEC STIM OTHER THAN WOUND: HCPCS

## 2018-08-03 PROCEDURE — 97110 THERAPEUTIC EXERCISES: CPT

## 2018-08-06 ENCOUNTER — HOSPITAL ENCOUNTER (OUTPATIENT)
Dept: PHYSICAL THERAPY | Age: 68
Setting detail: THERAPIES SERIES
Discharge: HOME | End: 2018-08-06
Payer: MEDICARE

## 2018-08-06 PROCEDURE — 97110 THERAPEUTIC EXERCISES: CPT

## 2018-08-06 PROCEDURE — G0283 ELEC STIM OTHER THAN WOUND: HCPCS

## 2018-08-06 NOTE — PROGRESS NOTES
Phone: [facility]           Fax: [phone]                           Outpatient Physical Therapy                                                                            Daily Note    Patient: Jing Méndez : 1950  [facility] #: [id number]   Referring Practitioner: John Morel MD     Referral Date : 18     Date: 2018    Diagnosis: Primary osteosrthritis of left knee (M17.12)   Treatment Diagnosis: L knee pain; difficulty walking     Onset Date: 18  PT Insurance Information: Medicare/Aetna   Total # of Visits Approved: 18 Per Physician Order  Total # of Visits to Date: 8  No Show: 0  Canceled Appointment: 0      Pre-Treatment Pain:  2/10  Subjective: Pt ambulated  into therapy with FWW stating pain is 2/10 in L knee. Pt reports walking up barn incline yesterday and was very sore afterwards. Pt incision is still seeping small amounts of blood occasionally and pt has quarter-sized water blister on inferiomedial aspect of L knee from keeping ice pack on longer than 15-20 minutes d/t pt falling asleep. Exercises:  Exercise 1: HEP: quad sets 5 x 5 sec hold, Heel slides 10x10 sec hold, heel prop 1 minute, ankle pumps   Exercise 2: SciFit 10 min LV 1.5  Exercise 3: Seated LAQ 2x15  Exercise 5: Knee flexion/ext stretch at step 2x30\"  Exercise 6: Slantboard stretch 2x30\" (Half of feet on)  Exercise 9: Standing weightshifts on blue foam side/side 1 min       Modalities:  Cryotherapy (Minutes\Location): x15 minutes   E-stim (parameters): IFC + CP to L knee x 15 minutes        Assessment  Assessment: Pt is showing decreased antalgic gait with mods to walker. Pt had increased burning sensation when performing step stretches and required rest break afterwards d/t shooting pain. Pt was educated to only stretch just before increased pain to decrease discomfort and soreness.  Tx session ended with IFC w/ cold pack to decrease pain and post therex

## 2018-08-08 ENCOUNTER — HOSPITAL ENCOUNTER (OUTPATIENT)
Dept: PHYSICAL THERAPY | Age: 68
Setting detail: THERAPIES SERIES
Discharge: HOME | End: 2018-08-08
Payer: MEDICARE

## 2018-08-08 PROCEDURE — G0283 ELEC STIM OTHER THAN WOUND: HCPCS

## 2018-08-08 PROCEDURE — 97110 THERAPEUTIC EXERCISES: CPT

## 2018-08-08 NOTE — PROGRESS NOTES
Phone: [facility]           Fax: [phone]                           Outpatient Physical Therapy                                                                            Daily Note    Patient: Afsaneh Mcdowell : 1950  [facility] #: [id number]   Referring Practitioner: Katty Dobson MD     Referral Date : 18     Date: 2018    Diagnosis: Primary osteosrthritis of left knee (M17.12)   Treatment Diagnosis: L knee pain; difficulty walking     Onset Date: 18  PT Insurance Information: Medicare/Aetna   Total # of Visits Approved: 18 Per Physician Order  Total # of Visits to Date: 9  No Show: 0  Canceled Appointment: 0      Pre-Treatment Pain:  2/10  Subjective: Pt arrived to therapy and reports pain in L knee is ~2/10. Pt states that yesterday his knee was really swollen and painful towards lateral aspect of knee with pain at 3-4/10 and reports that he took a pain pill last night before bed. Pt was educated on icing and temporaily using [employer] if reoccurs. Exercises:  Exercise 1: HEP: quad sets 5 x 5 sec hold, Heel slides 10x10 sec hold, heel prop 1 minute, ankle pumps   Exercise 2: SciFit 10 min LV 2.5  Exercise 3: Seated LAQ 2x15  Exercise 9: Standing weightshifts on blue foam fwd/bwd (L foot on foam, R off), side/side 1 min ea  Exercise 13: Sink ex 15x ea  Exercise 14: [employer] leg curls/leg extensions 4 pl 2x10  Exercise 15: [employer] knee extension/flexion stretches 1 min ea    Modalities:  Cryotherapy (Minutes\Location): x15 minutes   E-stim (parameters): IFC + CP to L knee x 15 minutes      Assessment  Assessment: [employer] exercises and stretches were added to improve strengthening of L knee and also to increase ROM with over pressure within pt pain tolerance. Pt tolerated all exercises with minimal complaints of pain this date. Tx ended with IFC w/ cold to decrease swelling and post therex soreness.  Pt was educated on HEP and to ice at home when needed. Will assess where pt stands with long-term goals next session. Patient Education  Pt educated on exercise rationale, icing at home, as well as HEP. Pt verbalized/demonstrated good understanding:     [x] Yes         [] No, pt required further clarification. Post Treatment Pain:  0/10      Plan  Times per week: 3  Plan weeks: 6 weeks       Goals  (Total # of Visits to Date: 5)   Short Term Goals - Time Frame for Short term goals: 3 weeks     Short term goal 1: Pt to initiate HEP -MET                                        [x]Met  []Partially met  []Not met   Short term goal 2: Pt to demonstrate L knee flexion PROM to > or = 50* to assist with increased functional mobility. -MET  [x]Met  []Partially met  []Not met      []Met  []Partially met  []Not met      []Met  []Partially met  []Not met     Long Term Goals - Time Frame for Long term goals : 6 weeks   Long term goal 1: Pt to be compliant and independent with HEP. -MET [x]Met  []Partially met  []Not met   Long term goal 2: Pt to demonstrate normalized gait pattern without use of AD to assist with return to ambulation in community. []Met  []Partially met  []Not met   Long term goal 3: Pt to demonstrate L knee extension AROM to < or = 2* to assist with normalized gait pattern. []Met  []Partially met  []Not met   Long term goal 4: Pt to demonstrate L knee flexion AROM to > or = 115* to assist with ADL's. []Met  []Partially met  []Not met   Long term goal 5: Pt to demonstrate 4+/5 L knee flexion and extension MMT to assist with increased ability to navigate stairs at home.   []Met  []Partially met  []Not met       Minutes Tracking:  Time In: 1115  Time Out: [address]  Minutes: [facility] [place]   Date: 8/8/2018

## 2018-08-10 ENCOUNTER — HOSPITAL ENCOUNTER (OUTPATIENT)
Dept: PHYSICAL THERAPY | Age: 68
Setting detail: THERAPIES SERIES
Discharge: HOME | End: 2018-08-10
Payer: MEDICARE

## 2018-08-10 PROCEDURE — 97110 THERAPEUTIC EXERCISES: CPT

## 2018-08-10 PROCEDURE — G8978 MOBILITY CURRENT STATUS: HCPCS

## 2018-08-10 PROCEDURE — G8979 MOBILITY GOAL STATUS: HCPCS

## 2018-08-10 PROCEDURE — G0283 ELEC STIM OTHER THAN WOUND: HCPCS

## 2018-08-10 NOTE — PROGRESS NOTES
Phone: [facility]           Fax: [phone]                           Outpatient Physical Therapy                                                                            Daily Note    Patient: Coleman Lozada : 1950  CSN #: [id number]   Referring Practitioner: Deacon Nuno MD     Referral Date : 18     Date: 8/10/2018    Diagnosis: Primary osteosrthritis of left knee (M17.12)   Treatment Diagnosis: L knee pain; difficulty walking     Onset Date: 18  PT Insurance Information: Medicare/Aetna   Total # of Visits Approved: 18 Per Physician Order  Total # of Visits to Date: 10  No Show: 0  Canceled Appointment: 0      Pre-Treatment Pain:  2-3/10  Subjective: Pt reports pain is ~2-3/10 in L knee this date. Pt reports not sleeping well last night d/t ambulating around stores and at home for prolonged periods of time. Pt states his L knee felt like it was \"on fire\" most of the night last night. Exercises:  Exercise 1: HEP: quad sets 5 x 5 sec hold, Heel slides 10x10 sec hold, heel prop 1 minute, ankle pumps   Exercise 2: SciFit 10 min LV 2.5  Exercise 3: Seated LAQ 2x15  Exercise 4: Supine: quad sets with towel roll under knee 15x hold 2-3 sec, SAQ x15, heel slides with strap 10x10\" hold        Exercise 7: Supine SLR/ABD SLR/ ADD SLR 15x ea           Exercise 11: Supine heel prop 2 min             Modalities:      Cryotherapy (Minutes\Location): x15 minutes       E-stim (parameters): IFC + CP to L knee x 15 minutes                  Assessment  Assessment: PT supervised last 10mins of treatment for insurance updates/G-codes. Pt currently demonstrates L knee flexion AAROM 75*, ext lacking 5* from 0. Pt educated on importance of aggressive HEP for knee ROM, as pt is currently 4 weeks post-op and behind schedule in regards to ROM. Informed PTA to increase manual interventions for knee mobility.      Patient Education  Importance of frequent and aggressive

## 2018-08-13 ENCOUNTER — HOSPITAL ENCOUNTER (OUTPATIENT)
Dept: PHYSICAL THERAPY | Age: 68
Setting detail: THERAPIES SERIES
Discharge: HOME | End: 2018-08-13
Payer: MEDICARE

## 2018-08-13 PROCEDURE — 97110 THERAPEUTIC EXERCISES: CPT

## 2018-08-15 ENCOUNTER — HOSPITAL ENCOUNTER (OUTPATIENT)
Dept: PHYSICAL THERAPY | Age: 68
Setting detail: THERAPIES SERIES
Discharge: HOME | End: 2018-08-15
Payer: MEDICARE

## 2018-08-15 PROCEDURE — 97110 THERAPEUTIC EXERCISES: CPT

## 2018-08-15 PROCEDURE — 97140 MANUAL THERAPY 1/> REGIONS: CPT

## 2018-08-15 PROCEDURE — G0283 ELEC STIM OTHER THAN WOUND: HCPCS

## 2018-08-15 NOTE — PROGRESS NOTES
Phone: [facility]           Fax: [phone]                           Outpatient Physical Therapy                                                                            Daily Note    Patient: Veronica Montalvo : 1950  CSN #: [id number]   Referring Practitioner: Kusum Arteaga MD     Referral Date : 18     Date: 8/15/2018    Diagnosis: Primary osteosrthritis of left knee (M17.12)   Treatment Diagnosis: L knee pain; difficulty walking     Onset Date: 18  PT Insurance Information: Medicare/Aetna   Total # of Visits Approved: 18 Per Physician Order  Total # of Visits to Date: 12  No Show: 0  Canceled Appointment: 0      Pre-Treatment Pain:  6/10  Subjective: Pt states he got incision checked out and states he does not have an infection, and was instructed to let incision remain uncovered and to pat dry after showering. Pt is to RTD next Thursday for follow up. Exercises:     Exercise 2: SciFit 8 min LV 2.5     Exercise 16: Prone knee flexion stretch 10x10\" hold with strap; heel slides 10x10\" hold            Manual:     PROM: L knee flexion- supine and prone                Modalities:      Cryotherapy (Minutes\Location): x15 minutes       E-stim (parameters): IFC + CP to L knee x 15 minutes                  Assessment  Assessment: L knee AAROM measured with strap/heel slides 75* this date; PROM L knee flexion 80*. Manual stretching/PROM was performed with pt in supine, prone and seated over EOB. Reviewed aggressive and frequent HEP for ROM. Will cont to progress as tolerated. Patient Education  Reviewed HEP. Pt verbalized/demonstrated good understanding:     [x] Yes         [] No, pt required further clarification.     Post Treatment Pain:  6/10      Plan  Times per week: 3  Plan weeks: 6 weeks       Goals  (Total # of Visits to Date: 15)   Short Term Goals - Time Frame for Short term goals: 3 weeks      Short term goal 1: Pt to initiate HEP

## 2018-08-17 ENCOUNTER — HOSPITAL ENCOUNTER (OUTPATIENT)
Dept: PHYSICAL THERAPY | Age: 68
Setting detail: THERAPIES SERIES
Discharge: HOME | End: 2018-08-17
Payer: MEDICARE

## 2018-08-20 ENCOUNTER — HOSPITAL ENCOUNTER (OUTPATIENT)
Dept: PHYSICAL THERAPY | Age: 68
Setting detail: THERAPIES SERIES
Discharge: HOME | End: 2018-08-20
Payer: MEDICARE

## 2018-08-20 PROCEDURE — G0283 ELEC STIM OTHER THAN WOUND: HCPCS

## 2018-08-20 PROCEDURE — 97110 THERAPEUTIC EXERCISES: CPT

## 2018-08-20 PROCEDURE — 97116 GAIT TRAINING THERAPY: CPT

## 2018-08-22 ENCOUNTER — HOSPITAL ENCOUNTER (OUTPATIENT)
Dept: PHYSICAL THERAPY | Age: 68
Setting detail: THERAPIES SERIES
Discharge: HOME | End: 2018-08-22
Payer: MEDICARE

## 2018-08-22 PROCEDURE — 97110 THERAPEUTIC EXERCISES: CPT

## 2018-08-22 PROCEDURE — 97140 MANUAL THERAPY 1/> REGIONS: CPT

## 2018-08-22 PROCEDURE — G0283 ELEC STIM OTHER THAN WOUND: HCPCS

## 2018-08-22 NOTE — PROGRESS NOTES
surfaces. Patient Education  Educated on gait with SPC and purpose of STM to quad. Pt verbalized/demonstrated good understanding:     [x] Yes         [] No, pt required further clarification. Post Treatment Pain:  2-3/10      Plan  Times per week: 3  Plan weeks: 6 weeks       Goals  (Total # of Visits to Date: 15)   Short Term Goals - Time Frame for Short term goals: 3 weeks      Short term goal 1: Pt to initiate HEP -MET                                        []Met   []Partially met  []Not met   Short term goal 2: Pt to demonstrate L knee flexion PROM to > or = 50* to assist with increased functional mobility. -MET  []Met   []Partially met  []Not met      []Met   []Partially met  []Not met      []Met   []Partially met  []Not met     Long Term Goals - Time Frame for Long term goals : 6 weeks   Long term goal 1: Pt to be compliant and independent with HEP. -MET []Met  []Partially met  []Not met   Long term goal 2: Pt to demonstrate normalized gait pattern without use of AD to assist with return to ambulation in community. []Met  []Partially met  []Not met   Long term goal 3: Pt to demonstrate L knee extension AROM to < or = 2* to assist with normalized gait pattern. []Met  []Partially met  []Not met   Long term goal 4: Pt to demonstrate L knee flexion AROM to > or = 115* to assist with ADL's. []Met  []Partially met  []Not met   Long term goal 5: Pt to demonstrate 4+/5 L knee flexion and extension MMT to assist with increased ability to navigate stairs at home.   []Met  []Partially met  []Not met       Minutes Tracking:  Time In: 1120  Time Out: [address] [facility]  Minutes: [id number], [place], DPT  Date: 8/22/2018

## 2018-08-24 ENCOUNTER — HOSPITAL ENCOUNTER (OUTPATIENT)
Dept: PHYSICAL THERAPY | Age: 68
Setting detail: THERAPIES SERIES
Discharge: HOME | End: 2018-08-24
Payer: MEDICARE

## 2018-08-24 PROCEDURE — 97140 MANUAL THERAPY 1/> REGIONS: CPT

## 2018-08-24 PROCEDURE — 97110 THERAPEUTIC EXERCISES: CPT

## 2018-08-24 PROCEDURE — G0283 ELEC STIM OTHER THAN WOUND: HCPCS

## 2018-08-24 NOTE — PROGRESS NOTES
measurements were taken to compare bilateral knees. R knee measurements were taken as follows: AROM ext = 0*, AROM flex = 125*. Tx session ended with IFC w/cold to decrease pain and inflammation. Will continue to advance exercises and manual techniques to improve pt L knee AROM. Patient Education  Pt educated on exercise rationale. Pt verbalized/demonstrated good understanding:     [x] Yes         [] No, pt required further clarification. Post Treatment Pain:  0/10      Plan  Times per week: 3  Plan weeks: 6 weeks       Goals  (Total # of Visits to Date: 13)   Short Term Goals - Time Frame for Short term goals: 3 weeks   Short term goal 1: Pt to initiate HEP -MET                                        [x]Met  []Partially met  []Not met   Short term goal 2: Pt to demonstrate L knee flexion PROM to > or = 50* to assist with increased functional mobility. -MET  [x]Met  []Partially met  []Not met      []Met  []Partially met  []Not met      []Met   []Partially met  []Not met     Long Term Goals - Time Frame for Long term goals : 6 weeks   Long term goal 1: Pt to be compliant and independent with HEP. -MET [x]Met  []Partially met  []Not met   Long term goal 2: Pt to demonstrate normalized gait pattern without use of AD to assist with return to ambulation in community. []Met  []Partially met  []Not met   Long term goal 3: Pt to demonstrate L knee extension AROM to < or = 2* to assist with normalized gait pattern. []Met  []Partially met  []Not met   Long term goal 4: Pt to demonstrate L knee flexion AROM to > or = 115* to assist with ADL's. []Met  []Partially met  []Not met   Long term goal 5: Pt to demonstrate 4+/5 L knee flexion and extension MMT to assist with increased ability to navigate stairs at home.   []Met  []Partially met  []Not met       Minutes Tracking:  Time In: 1130  Time Out: [address]  Minutes: [address], [place]   Date: 8/24/2018

## 2018-08-27 ENCOUNTER — HOSPITAL ENCOUNTER (OUTPATIENT)
Dept: PHYSICAL THERAPY | Age: 68
Setting detail: THERAPIES SERIES
Discharge: HOME | End: 2018-08-27
Payer: MEDICARE

## 2018-08-27 PROCEDURE — G0283 ELEC STIM OTHER THAN WOUND: HCPCS

## 2018-08-27 PROCEDURE — 97110 THERAPEUTIC EXERCISES: CPT

## 2018-08-27 NOTE — PROGRESS NOTES
Phone: [facility]           Fax: [phone]                           Outpatient Physical Therapy                                                                            Daily Note    Patient: Eduardo Canas : 1950  CSN #: [id number]   Referring Practitioner: Yancy Chahal MD     Referral Date : 18     Date: 2018    Diagnosis: Primary osteosrthritis of left knee (M17.12)   Treatment Diagnosis: L knee pain; difficulty walking     Onset Date: 18  PT Insurance Information: Medicare/Aetna   Total # of Visits Approved: 18 Per Physician Order  Total # of Visits to Date: 16  No Show: 0  Canceled Appointment: 0      Pre-Treatment Pain:  1/10  Subjective: Pt reports pain is 1/10 in L knee arriving to therapy. Pt states that he thinks he overdid stretching it this past weekend and reports having swelling in knee and down into L foot as well. Exercises:  Exercise 1: HEP: quad sets 5 x 5 sec hold, Heel slides 10x10 sec hold, heel prop 1 minute, ankle pumps   Exercise 2: SciFit 10 min LV 2.5  Exercise 3: Seated LAQ 2x15  Exercise 5: Knee flexion/ext stretch at step 2x30\"  Exercise 7: Supine SLR/ABD SLR/ ADD SLR 15x ea  Exercise 16: Prone knee flexion stretch 10x10\" hold with strap; heel slides 10x10\" hold  Exercise 19: Seated ball squeezes 2x15    Modalities:  Cryotherapy (Minutes\Location): x15 minutes   E-stim (parameters): IFC + CP to L knee x 15 minutes      Assessment  Assessment: Measurements were taken as follows: AAROM L knee flex = 95*, AROM L knee flex = 93*, AROM L knee ext = 5*. Pt was educated on exercises to assist with improving extension at home as well as in clinic.  would like measurements next visit as well to show progress before visit on Thursday, . Patient Education  Pt educated on exercise rationale and HEP.   Pt verbalized/demonstrated good understanding:     [x] Yes         [] No, pt required further clarification. Post Treatment Pain:  0/10      Plan  Times per week: 3  Plan weeks: 6 weeks       Goals  (Total # of Visits to Date: 12)   Short Term Goals - Time Frame for Short term goals: 3 weeks     Short term goal 1: Pt to initiate HEP -MET                                        [x]Met   []Partially met  []Not met   Short term goal 2: Pt to demonstrate L knee flexion PROM to > or = 50* to assist with increased functional mobility. -MET  [x]Met  []Partially met  []Not met      []Met  []Partially met  []Not met      []Met  []Partially met  []Not met     Long Term Goals - Time Frame for Long term goals : 6 weeks   Long term goal 1: Pt to be compliant and independent with HEP. -MET [x]Met  []Partially met  []Not met   Long term goal 2: Pt to demonstrate normalized gait pattern without use of AD to assist with return to ambulation in community. []Met  []Partially met  []Not met   Long term goal 3: Pt to demonstrate L knee extension AROM to < or = 2* to assist with normalized gait pattern. []Met  []Partially met  []Not met   Long term goal 4: Pt to demonstrate L knee flexion AROM to > or = 115* to assist with ADL's. []Met  []Partially met  []Not met   Long term goal 5: Pt to demonstrate 4+/5 L knee flexion and extension MMT to assist with increased ability to navigate stairs at home.   []Met  []Partially met  []Not met       Minutes Tracking:  Time In: 1115  Time Out: [address]  Minutes: [place], [place]   Date: 8/27/2018

## 2018-08-29 ENCOUNTER — HOSPITAL ENCOUNTER (OUTPATIENT)
Dept: PHYSICAL THERAPY | Age: 68
Setting detail: THERAPIES SERIES
Discharge: HOME | End: 2018-08-29
Payer: MEDICARE

## 2018-08-29 PROCEDURE — G0283 ELEC STIM OTHER THAN WOUND: HCPCS

## 2018-08-29 PROCEDURE — 97110 THERAPEUTIC EXERCISES: CPT

## 2018-08-29 PROCEDURE — G8979 MOBILITY GOAL STATUS: HCPCS

## 2018-08-29 PROCEDURE — G8978 MOBILITY CURRENT STATUS: HCPCS

## 2018-08-29 PROCEDURE — 97140 MANUAL THERAPY 1/> REGIONS: CPT

## 2018-08-29 NOTE — PLAN OF CARE
Judgment   [] Outcome Measure:     Goals  Short term goals  Time Frame for Short term goals: 3 weeks   Short term goal 1: Pt to initiate HEP -MET  Short term goal 2: Pt to demonstrate L knee flexion PROM to > or = 50* to assist with increased functional mobility. -MET  Long term goals  Time Frame for Long term goals : 6 weeks   Long term goal 1: Pt to be compliant and independent with HEP. -MET  Long term goal 2: Pt to demonstrate normalized gait pattern without use of AD to assist with return to ambulation in community. Long term goal 3: Pt to demonstrate L knee extension AROM to < or = 2* to assist with normalized gait pattern. Long term goal 4: Pt to demonstrate L knee flexion AROM to > or = 115* to assist with ADL's. Long term goal 5: Pt to demonstrate 4+/5 L knee flexion and extension MMT to assist with increased ability to navigate stairs at home.      Prognosis  Prognosis: Good    Treatment Plan   Times per week: 3  Plan weeks: 6 weeks   [x]HP/CP      [x]Electrical Stim   [x]Therapeutic Exercise      [x]Gait Training  []Aquatics   [x]Ultrasound         [x]Patient Education/HEP   [x]Manual Therapy  []Traction    [x]Neuro-juan manuel        [x]Soft Tissue Mobs            []Home TENS  []Iontophoresis    []Orthotic casting/fitting      []Dry Needling             Electronically signed by: Ambrose Smith PT, DPT    Date: 8/29/2018      ______________________________________ Date: 8/29/2018   Physician Signature

## 2018-08-29 NOTE — PROGRESS NOTES
Phone: [facility]           Fax: [phone]                           Outpatient Physical Therapy                                                                            Daily Note    Patient: Rosemary Ocasio : 1950  CSN #: [id number]   Referring Practitioner: Bolivar Bae MD     Referral Date : 18     Date: 2018    Diagnosis: Primary osteosrthritis of left knee (M17.12)   Treatment Diagnosis: L knee pain; difficulty walking     Onset Date: 18  PT Insurance Information: Medicare/Aetna   Total # of Visits Approved: 30 Per Physician Order  Total # of Visits to Date: 17  No Show: 0  Canceled Appointment: 0      Pre-Treatment Pain:  1-2/10  Subjective: Pt states he has been using cane consistently since last week and even occasionally walks around the house without [address] short distances, but holding on to wall or objects nearby. Pt states pain level 1-2/10 currently. Pt states this week he has been having more swelling and noticable in L foot/ankle also. Exercises:     Exercise 2: SciFit 10 min LV 2.5  Exercise 3: Seated LAQ 2x15     Exercise 5: Knee flexion/ext stretch at step 2x30\"           Manual:  Joint mobilization: Patellar mobs  PROM: L knee flexion- supine, prone and seated           Soft Tissue Mobalization: STM L quad tendon/distal quad to improve mobility; thermaprobe L distal quad region to improve soft tissue mobility and knee ROM. Modalities:      Cryotherapy (Minutes\Location): x15 minutes       E-stim (parameters): IFC + CP to L knee x 15 minutes                  Assessment  Assessment: Pt has completed 17 PT visits to date and is currently ~7 weeks s/p L TKA. Pt currently ambulating with SPC, which pt transitioned to using consistently over the past week.  Pt demonstrates moderate edema in L knee with circumfrential measurements compared bilaterally: 4cm L>R suprapatellar region; 2cm L>R mid-patellar region; 2cm L> R

## 2018-08-31 ENCOUNTER — HOSPITAL ENCOUNTER (OUTPATIENT)
Dept: PHYSICAL THERAPY | Age: 68
Setting detail: THERAPIES SERIES
Discharge: HOME | End: 2018-08-31
Payer: MEDICARE

## 2018-08-31 PROCEDURE — G0283 ELEC STIM OTHER THAN WOUND: HCPCS

## 2018-08-31 PROCEDURE — 97110 THERAPEUTIC EXERCISES: CPT

## 2018-08-31 NOTE — PROGRESS NOTES
Phone: [facility]           Fax: [phone]                           Outpatient Physical Therapy                                                                            Daily Note    Patient: Deisy Palacios : 1950  CSN #: [id number]   Referring Practitioner: Manpreet Akins MD     Referral Date : 18     Date: 2018    Diagnosis: Primary osteosrthritis of left knee (M17.12)   Treatment Diagnosis: L knee pain; difficulty walking     Onset Date: 18  PT Insurance Information: Medicare/Aetna   Total # of Visits Approved: 30 Per Physician Order  Total # of Visits to Date: 18  No Show: 0  Canceled Appointment: 0      Pre-Treatment Pain:  0-1/10  Subjective: Pt arrived to therapy and reports pain is 0-1/10 in L knee this date. Pt states  was satisfied with L knee and gave pt prescription to continue therapy in [place]. Pt reports  stated that no manipulation will need to be done d/t flex ROM = 90*. Exercises:  Exercise 1: HEP: quad sets 5 x 5 sec hold, Heel slides 10x10 sec hold, heel prop 1 minute, ankle pumps   Exercise 2: SciFit 10 min LV 4.0  Exercise 5: Knee flexion/ext stretch at step 2x30\"  Exercise 6: Slantboard stretch 2x30\"  Exercise 10: Sit to stands 2x5 (black chair with armrests)  Exercise 16: Prone knee flexion stretch 10x10\" hold with strap; heel slides 10x10\" hold  Exercise 17: Seated knee flexion stretch 3x30\"    Assessment  Assessment: Tx focused on stretching to increase L knee flex ROM this date. Pt tolerated all exercises with slight pain with overpressure for improved range. Pt was given more exercises for HEP to improve strength and ROM between switching therapy clinics when departing for [place]. Will continue to work on improving ROM as well as gait for normalized gait pattern and ease when performing ADL's. IFC w/ CP was used at end of session to decrease pain, swelling, and post therex soreness.     Patient

## 2018-09-05 ENCOUNTER — HOSPITAL ENCOUNTER (OUTPATIENT)
Dept: PHYSICAL THERAPY | Age: 68
Setting detail: THERAPIES SERIES
Discharge: HOME | End: 2018-09-05
Payer: MEDICARE

## 2018-09-05 PROCEDURE — G0283 ELEC STIM OTHER THAN WOUND: HCPCS

## 2018-09-05 PROCEDURE — 97110 THERAPEUTIC EXERCISES: CPT

## 2021-05-13 ENCOUNTER — HOSPITAL ENCOUNTER (OUTPATIENT)
Age: 71
Setting detail: OBSERVATION
LOS: 1 days | Discharge: HOME | DRG: 694 | End: 2021-05-14
Attending: EMERGENCY MEDICINE | Admitting: FAMILY MEDICINE
Payer: MEDICARE

## 2021-05-13 ENCOUNTER — APPOINTMENT (OUTPATIENT)
Dept: GENERAL RADIOLOGY | Age: 71
DRG: 694 | End: 2021-05-13
Payer: MEDICARE

## 2021-05-13 ENCOUNTER — APPOINTMENT (OUTPATIENT)
Dept: CT IMAGING | Age: 71
DRG: 694 | End: 2021-05-13
Payer: MEDICARE

## 2021-05-13 DIAGNOSIS — N17.9 ACUTE KIDNEY INJURY (HCC): ICD-10-CM

## 2021-05-13 DIAGNOSIS — N20.1 URETEROLITHIASIS: Primary | ICD-10-CM

## 2021-05-13 DIAGNOSIS — D72.829 LEUKOCYTOSIS, UNSPECIFIED TYPE: ICD-10-CM

## 2021-05-13 PROBLEM — N13.2 HYDRONEPHROSIS WITH URINARY OBSTRUCTION DUE TO URETERAL CALCULUS: Status: ACTIVE | Noted: 2021-05-13

## 2021-05-13 LAB
-: ABNORMAL
ABSOLUTE EOS #: <0.03 K/UL (ref 0–0.44)
ABSOLUTE IMMATURE GRANULOCYTE: 0.07 K/UL (ref 0–0.3)
ABSOLUTE LYMPH #: 1.49 K/UL (ref 1.1–3.7)
ABSOLUTE MONO #: 1.18 K/UL (ref 0.1–1.2)
ALBUMIN SERPL-MCNC: 4.6 G/DL (ref 3.5–5.2)
ALBUMIN/GLOBULIN RATIO: 2 (ref 1–2.5)
ALP BLD-CCNC: 95 U/L (ref 40–129)
ALT SERPL-CCNC: 46 U/L (ref 5–41)
AMORPHOUS: ABNORMAL
ANION GAP SERPL CALCULATED.3IONS-SCNC: 10 MMOL/L (ref 9–17)
AST SERPL-CCNC: 28 U/L
BACTERIA: ABNORMAL
BASOPHILS # BLD: 0 % (ref 0–2)
BASOPHILS ABSOLUTE: 0.06 K/UL (ref 0–0.2)
BILIRUB SERPL-MCNC: 1.04 MG/DL (ref 0.3–1.2)
BILIRUBIN URINE: NEGATIVE
BNP INTERPRETATION: NORMAL
BUN BLDV-MCNC: 15 MG/DL (ref 8–23)
BUN/CREAT BLD: 10 (ref 9–20)
CALCIUM SERPL-MCNC: 9.5 MG/DL (ref 8.6–10.4)
CASTS UA: ABNORMAL /LPF
CHLORIDE BLD-SCNC: 103 MMOL/L (ref 98–107)
CO2: 27 MMOL/L (ref 20–31)
COLOR: YELLOW
COMMENT UA: ABNORMAL
CREAT SERPL-MCNC: 1.56 MG/DL (ref 0.7–1.2)
CRYSTALS, UA: ABNORMAL /HPF
DIFFERENTIAL TYPE: ABNORMAL
EOSINOPHILS RELATIVE PERCENT: 0 % (ref 1–4)
EPITHELIAL CELLS UA: ABNORMAL /HPF (ref 0–5)
GFR AFRICAN AMERICAN: 53 ML/MIN
GFR NON-AFRICAN AMERICAN: 44 ML/MIN
GFR SERPL CREATININE-BSD FRML MDRD: ABNORMAL ML/MIN/{1.73_M2}
GFR SERPL CREATININE-BSD FRML MDRD: ABNORMAL ML/MIN/{1.73_M2}
GLUCOSE BLD-MCNC: 163 MG/DL (ref 70–99)
GLUCOSE URINE: NEGATIVE
HCT VFR BLD CALC: 40.6 % (ref 40.7–50.3)
HEMOGLOBIN: 13.6 G/DL (ref 13–17)
IMMATURE GRANULOCYTES: 0 %
KETONES, URINE: ABNORMAL
LACTIC ACID, WHOLE BLOOD: NORMAL MMOL/L (ref 0.7–2.1)
LACTIC ACID: 1.4 MMOL/L (ref 0.5–2.2)
LEUKOCYTE ESTERASE, URINE: NEGATIVE
LIPASE: 22 U/L (ref 13–60)
LYMPHOCYTES # BLD: 8 % (ref 24–43)
MCH RBC QN AUTO: 32.2 PG (ref 25.2–33.5)
MCHC RBC AUTO-ENTMCNC: 33.5 G/DL (ref 28.4–34.8)
MCV RBC AUTO: 96 FL (ref 82.6–102.9)
MONOCYTES # BLD: 7 % (ref 3–12)
MUCUS: ABNORMAL
NITRITE, URINE: NEGATIVE
NRBC AUTOMATED: 0 PER 100 WBC
OTHER OBSERVATIONS UA: ABNORMAL
PDW BLD-RTO: 15 % (ref 11.8–14.4)
PH UA: 6.5 (ref 5–9)
PLATELET # BLD: 312 K/UL (ref 138–453)
PLATELET ESTIMATE: ABNORMAL
PMV BLD AUTO: 9.8 FL (ref 8.1–13.5)
POTASSIUM SERPL-SCNC: 4.5 MMOL/L (ref 3.7–5.3)
PRO-BNP: 147 PG/ML
PROTEIN UA: NEGATIVE
RBC # BLD: 4.23 M/UL (ref 4.21–5.77)
RBC # BLD: ABNORMAL 10*6/UL
RBC UA: ABNORMAL /HPF (ref 0–2)
RENAL EPITHELIAL, UA: ABNORMAL /HPF
SEG NEUTROPHILS: 85 % (ref 36–65)
SEGMENTED NEUTROPHILS ABSOLUTE COUNT: 15.12 K/UL (ref 1.5–8.1)
SODIUM BLD-SCNC: 140 MMOL/L (ref 135–144)
SPECIFIC GRAVITY UA: 1.02 (ref 1.01–1.02)
TOTAL PROTEIN: 6.9 G/DL (ref 6.4–8.3)
TRICHOMONAS: ABNORMAL
TROPONIN INTERP: NORMAL
TROPONIN T: NORMAL NG/ML
TROPONIN, HIGH SENSITIVITY: 11 NG/L (ref 0–22)
TURBIDITY: CLEAR
URINE HGB: NEGATIVE
UROBILINOGEN, URINE: NORMAL
WBC # BLD: 17.9 K/UL (ref 3.5–11.3)
WBC # BLD: ABNORMAL 10*3/UL
WBC UA: ABNORMAL /HPF (ref 0–5)
YEAST: ABNORMAL

## 2021-05-13 PROCEDURE — 71046 X-RAY EXAM CHEST 2 VIEWS: CPT

## 2021-05-13 PROCEDURE — 96375 TX/PRO/DX INJ NEW DRUG ADDON: CPT

## 2021-05-13 PROCEDURE — 2580000003 HC RX 258: Performed by: FAMILY MEDICINE

## 2021-05-13 PROCEDURE — 85025 COMPLETE CBC W/AUTO DIFF WBC: CPT

## 2021-05-13 PROCEDURE — 74177 CT ABD & PELVIS W/CONTRAST: CPT

## 2021-05-13 PROCEDURE — 84484 ASSAY OF TROPONIN QUANT: CPT

## 2021-05-13 PROCEDURE — 96374 THER/PROPH/DIAG INJ IV PUSH: CPT

## 2021-05-13 PROCEDURE — 94761 N-INVAS EAR/PLS OXIMETRY MLT: CPT

## 2021-05-13 PROCEDURE — 6360000002 HC RX W HCPCS: Performed by: FAMILY MEDICINE

## 2021-05-13 PROCEDURE — 83690 ASSAY OF LIPASE: CPT

## 2021-05-13 PROCEDURE — 83605 ASSAY OF LACTIC ACID: CPT

## 2021-05-13 PROCEDURE — 80053 COMPREHEN METABOLIC PANEL: CPT

## 2021-05-13 PROCEDURE — 96365 THER/PROPH/DIAG IV INF INIT: CPT

## 2021-05-13 PROCEDURE — 81001 URINALYSIS AUTO W/SCOPE: CPT

## 2021-05-13 PROCEDURE — 99285 EMERGENCY DEPT VISIT HI MDM: CPT

## 2021-05-13 PROCEDURE — 83880 ASSAY OF NATRIURETIC PEPTIDE: CPT

## 2021-05-13 PROCEDURE — 96361 HYDRATE IV INFUSION ADD-ON: CPT

## 2021-05-13 PROCEDURE — G0378 HOSPITAL OBSERVATION PER HR: HCPCS

## 2021-05-13 PROCEDURE — 6360000004 HC RX CONTRAST MEDICATION: Performed by: EMERGENCY MEDICINE

## 2021-05-13 PROCEDURE — 36415 COLL VENOUS BLD VENIPUNCTURE: CPT

## 2021-05-13 PROCEDURE — 93005 ELECTROCARDIOGRAM TRACING: CPT | Performed by: EMERGENCY MEDICINE

## 2021-05-13 PROCEDURE — 2580000003 HC RX 258: Performed by: EMERGENCY MEDICINE

## 2021-05-13 PROCEDURE — 6360000002 HC RX W HCPCS: Performed by: EMERGENCY MEDICINE

## 2021-05-13 RX ORDER — POTASSIUM CHLORIDE 20 MEQ/1
40 TABLET, EXTENDED RELEASE ORAL PRN
Status: DISCONTINUED | OUTPATIENT
Start: 2021-05-13 | End: 2021-05-14 | Stop reason: HOSPADM

## 2021-05-13 RX ORDER — SODIUM CHLORIDE 0.9 % (FLUSH) 0.9 %
5-40 SYRINGE (ML) INJECTION PRN
Status: DISCONTINUED | OUTPATIENT
Start: 2021-05-13 | End: 2021-05-14 | Stop reason: HOSPADM

## 2021-05-13 RX ORDER — CIPROFLOXACIN 2 MG/ML
400 INJECTION, SOLUTION INTRAVENOUS EVERY 12 HOURS
Status: DISCONTINUED | OUTPATIENT
Start: 2021-05-13 | End: 2021-05-14 | Stop reason: HOSPADM

## 2021-05-13 RX ORDER — ONDANSETRON 2 MG/ML
4 INJECTION INTRAMUSCULAR; INTRAVENOUS EVERY 6 HOURS PRN
Status: DISCONTINUED | OUTPATIENT
Start: 2021-05-13 | End: 2021-05-14 | Stop reason: HOSPADM

## 2021-05-13 RX ORDER — POTASSIUM CHLORIDE 7.45 MG/ML
10 INJECTION INTRAVENOUS PRN
Status: DISCONTINUED | OUTPATIENT
Start: 2021-05-13 | End: 2021-05-14 | Stop reason: HOSPADM

## 2021-05-13 RX ORDER — SODIUM CHLORIDE 9 MG/ML
25 INJECTION, SOLUTION INTRAVENOUS PRN
Status: DISCONTINUED | OUTPATIENT
Start: 2021-05-13 | End: 2021-05-14 | Stop reason: HOSPADM

## 2021-05-13 RX ORDER — FENTANYL CITRATE 50 UG/ML
50 INJECTION, SOLUTION INTRAMUSCULAR; INTRAVENOUS ONCE
Status: COMPLETED | OUTPATIENT
Start: 2021-05-13 | End: 2021-05-13

## 2021-05-13 RX ORDER — SODIUM CHLORIDE, SODIUM LACTATE, POTASSIUM CHLORIDE, CALCIUM CHLORIDE 600; 310; 30; 20 MG/100ML; MG/100ML; MG/100ML; MG/100ML
INJECTION, SOLUTION INTRAVENOUS CONTINUOUS
Status: DISCONTINUED | OUTPATIENT
Start: 2021-05-13 | End: 2021-05-14 | Stop reason: HOSPADM

## 2021-05-13 RX ORDER — ONDANSETRON 4 MG/1
4 TABLET, ORALLY DISINTEGRATING ORAL EVERY 8 HOURS PRN
Status: DISCONTINUED | OUTPATIENT
Start: 2021-05-13 | End: 2021-05-14 | Stop reason: HOSPADM

## 2021-05-13 RX ORDER — ONDANSETRON 2 MG/ML
4 INJECTION INTRAMUSCULAR; INTRAVENOUS ONCE
Status: COMPLETED | OUTPATIENT
Start: 2021-05-13 | End: 2021-05-13

## 2021-05-13 RX ORDER — MORPHINE SULFATE 2 MG/ML
2 INJECTION, SOLUTION INTRAMUSCULAR; INTRAVENOUS
Status: DISCONTINUED | OUTPATIENT
Start: 2021-05-13 | End: 2021-05-14 | Stop reason: HOSPADM

## 2021-05-13 RX ORDER — 0.9 % SODIUM CHLORIDE 0.9 %
1000 INTRAVENOUS SOLUTION INTRAVENOUS ONCE
Status: COMPLETED | OUTPATIENT
Start: 2021-05-13 | End: 2021-05-13

## 2021-05-13 RX ORDER — SODIUM CHLORIDE 0.9 % (FLUSH) 0.9 %
5-40 SYRINGE (ML) INJECTION EVERY 12 HOURS SCHEDULED
Status: DISCONTINUED | OUTPATIENT
Start: 2021-05-13 | End: 2021-05-14 | Stop reason: HOSPADM

## 2021-05-13 RX ADMIN — MORPHINE SULFATE 2 MG: 2 INJECTION, SOLUTION INTRAMUSCULAR; INTRAVENOUS at 22:00

## 2021-05-13 RX ADMIN — FENTANYL CITRATE 50 MCG: 50 INJECTION, SOLUTION INTRAMUSCULAR; INTRAVENOUS at 17:19

## 2021-05-13 RX ADMIN — IOPAMIDOL 75 ML: 755 INJECTION, SOLUTION INTRAVENOUS at 16:49

## 2021-05-13 RX ADMIN — WATER 1000 MG: 1 INJECTION INTRAMUSCULAR; INTRAVENOUS; SUBCUTANEOUS at 19:08

## 2021-05-13 RX ADMIN — CIPROFLOXACIN 400 MG: 2 INJECTION, SOLUTION INTRAVENOUS at 22:01

## 2021-05-13 RX ADMIN — SODIUM CHLORIDE 1000 ML: 9 INJECTION, SOLUTION INTRAVENOUS at 17:15

## 2021-05-13 RX ADMIN — SODIUM CHLORIDE, POTASSIUM CHLORIDE, SODIUM LACTATE AND CALCIUM CHLORIDE: 600; 310; 30; 20 INJECTION, SOLUTION INTRAVENOUS at 22:01

## 2021-05-13 RX ADMIN — ONDANSETRON 4 MG: 2 INJECTION INTRAMUSCULAR; INTRAVENOUS at 17:19

## 2021-05-13 ASSESSMENT — PAIN SCALES - GENERAL
PAINLEVEL_OUTOF10: 7
PAINLEVEL_OUTOF10: 7

## 2021-05-13 ASSESSMENT — ENCOUNTER SYMPTOMS
BACK PAIN: 0
SHORTNESS OF BREATH: 1
NAUSEA: 1
VOMITING: 0
DIARRHEA: 0
ABDOMINAL PAIN: 1
VOICE CHANGE: 0
CHEST TIGHTNESS: 0
BLOOD IN STOOL: 0
COUGH: 0
TROUBLE SWALLOWING: 0

## 2021-05-13 ASSESSMENT — PAIN DESCRIPTION - DESCRIPTORS
DESCRIPTORS: SHARP
DESCRIPTORS: SHARP;PRESSURE

## 2021-05-13 ASSESSMENT — PAIN DESCRIPTION - ORIENTATION
ORIENTATION: LEFT;LOWER
ORIENTATION: LEFT;LOWER

## 2021-05-13 NOTE — ED NOTES
Called Dr Talha Velazco via answering service, connected call to Dr Jacqui Kam.       Ximena Longoria  05/13/21 1844

## 2021-05-13 NOTE — ED PROVIDER NOTES
[facility] ED    EMERGENCY MEDICINE     Pt Name: Eliane French  MRN: [id number]  [place] 1950  Date of evaluation: 5/13/2021  Provider: Aria Peters DO, [address]       Chief Complaint   Patient presents with    Abdominal Pain     LLQ, onset 2 days ago, sharp. Pt sent from Urgent care for bloating/abdominal distention       HISTORY OF PRESENT ILLNESS    Elinae French is a pleasant 70 y.o. male who presents to the emergency department from home with complaints of left lower quadrant abdominal pain that started several days ago and been progressive. He experienced some nausea but no vomiting. Denies any fevers or chills. Denies any urinary complaints or concerns. Did have some shortness of breath that he noted mostly today which she states is somewhat unusual for him. He denies any lower extremity pain or swelling. No dark or bloody stools. He went to the urgent care for this and was sent to the ED for evaluation      Triage notes and Nursing notes were reviewed by myself. Any discrepancies are addressed above. PAST MEDICAL HISTORY     Past Medical History:   Diagnosis Date    Arthritis     Hyperlipidemia        SURGICAL HISTORY       Past Surgical History:   Procedure Laterality Date    JOINT REPLACEMENT Left 07/12/2018    knee    PA TOTAL KNEE ARTHROPLASTY Left 7/12/2018    KNEE TOTAL ARTHROPLASTY performed by Gladys Yeh MD at [facility]       Previous Medications    ASPIRIN 81 MG CHEWABLE TABLET    Take 1 tablet by mouth 2 times daily       ALLERGIES     Patient has no known allergies. FAMILY HISTORY     History reviewed. No pertinent family history.      SOCIAL HISTORY       Social History     Socioeconomic History    Marital status:      Spouse name: None    Number of children: None    Years of education: None    Highest education level: None   Occupational History    None   Social Needs    Financial resource strain: None    Food insecurity     Worry: None     Inability: None    Transportation needs     Medical: None     Non-medical: None   Tobacco Use    Smoking status: Former Smoker     Types: Cigarettes     Quit date: 2000     Years since quittin.3    Smokeless tobacco: Never Used   Substance and Sexual Activity    Alcohol use: No    Drug use: No    Sexual activity: None   Lifestyle    Physical activity     Days per week: None     Minutes per session: None    Stress: None   Relationships    Social connections     Talks on phone: None     Gets together: None     Attends Congregational service: None     Active member of club or organization: None     Attends meetings of clubs or organizations: None     Relationship status: None    Intimate partner violence     Fear of current or ex partner: None     Emotionally abused: None     Physically abused: None     Forced sexual activity: None   Other Topics Concern    None   Social History Narrative    None       REVIEW OF SYSTEMS     Review of Systems   Constitutional: Negative for chills, diaphoresis and fever. HENT: Negative for trouble swallowing and voice change. Eyes: Negative for visual disturbance. Respiratory: Positive for shortness of breath. Negative for cough and chest tightness. Cardiovascular: Negative for chest pain and leg swelling. Gastrointestinal: Positive for abdominal pain and nausea. Negative for blood in stool, diarrhea and vomiting. Genitourinary: Negative for dysuria, frequency and hematuria. Musculoskeletal: Negative for back pain and neck pain. Skin: Negative for rash and wound. Neurological: Negative for speech difficulty, weakness, numbness and headaches. Psychiatric/Behavioral: Negative for confusion. Except as noted above the remainder of the review of systems was reviewed and is.    PHYSICAL EXAM    (up to 7 for level 4, 8 or more for level 5)     ED Triage Vitals   BP Temp Temp Source Pulse Resp SpO2 Height Weight   05/13/21 1547 05/13/21 1543 05/13/21 1543 05/13/21 1543 05/13/21 1543 05/13/21 1543 05/13/21 1543 05/13/21 1543   (!) 151/85 98.6 °F (37 °C) Tympanic 80 18 95 % 5' 8\" (1.727 m) 208 lb (94.3 kg)       Physical Exam  Vitals signs and nursing note reviewed. Constitutional:       General: He is not in acute distress. Appearance: He is well-developed. He is not ill-appearing, toxic-appearing or diaphoretic. HENT:      Head: Normocephalic and atraumatic. Eyes:      General: No scleral icterus. Conjunctiva/sclera: Conjunctivae normal.      Right eye: Right conjunctiva is not injected. Left eye: Left conjunctiva is not injected. Pupils: Pupils are equal, round, and reactive to light. Neck:      Musculoskeletal: Normal range of motion and neck supple. Thyroid: No thyromegaly. Trachea: No tracheal deviation. Cardiovascular:      Rate and Rhythm: Normal rate and regular rhythm. Heart sounds: Normal heart sounds. No murmur. No friction rub. No gallop. Pulmonary:      Effort: Pulmonary effort is normal. No respiratory distress. Breath sounds: Normal breath sounds. No stridor. No wheezing or rales. Abdominal:      General: Bowel sounds are normal. There is no distension. Palpations: Abdomen is soft. There is no mass. Tenderness: There is abdominal tenderness (mildly tender to palpation LLQ). There is no guarding or rebound. Comments: Negative Snyder's sign  Nontender McBurney's Point  Negative Rovsig's sign  No bruising or echymosis of abdomen   Musculoskeletal:         General: No tenderness. Comments: Negative Mary's Sign bilaterally   Lymphadenopathy:      Cervical: No cervical adenopathy. Skin:     General: Skin is warm and dry. Coloration: Skin is not pale. Findings: No erythema or rash. Neurological:      Mental Status: He is alert and oriented to person, place, and time.       Cranial Nerves: No cranial nerve setting of proximal stone. Patient would benefit from admission with IV fluids, pain control, urology consult. Awaiting urinalysis. [AB]      ED Course User Index  [AB] Gildardo Keita DO           ED Medications administered this visit:    Medications   cefTRIAXone (ROCEPHIN) 1,000 mg in sterile water 10 mL IV syringe (has no administration in time range)   0.9 % sodium chloride bolus (0 mLs Intravenous Stopped 5/13/21 1820)   fentaNYL (SUBLIMAZE) injection 50 mcg (50 mcg Intravenous Given 5/13/21 1719)   ondansetron (ZOFRAN) injection 4 mg (4 mg Intravenous Given 5/13/21 1719)   iopamidol (ISOVUE-370) 76 % injection 75 mL (75 mLs Intravenous Given 5/13/21 1649)         Procedures: (None if blank)       CLINICAL       1. Ureterolithiasis    2. Leukocytosis, unspecified type    3. Acute kidney injury ([facility] [address]          DISPOSITION/PLAN   DISPOSITION        PATIENT REFERRED TO:  No follow-up provider specified.     DISCHARGE MEDICATIONS:  New Prescriptions    No medications on file              (Please note that portions of this note were completed with a voice recognition program.  Efforts were made to edit the dictations but occasionallywords are mis-transcribed.)      Janise Goodpasture, DO,FACEP (electronically signed)  Attending Physician, Emergency [address] [place], [place] [address]DO laureano  05/13/21 1902

## 2021-05-14 ENCOUNTER — APPOINTMENT (OUTPATIENT)
Dept: GENERAL RADIOLOGY | Age: 71
DRG: 694 | End: 2021-05-14
Payer: MEDICARE

## 2021-05-14 VITALS
WEIGHT: 220 LBS | OXYGEN SATURATION: 96 % | SYSTOLIC BLOOD PRESSURE: 154 MMHG | HEART RATE: 71 BPM | RESPIRATION RATE: 16 BRPM | HEIGHT: 68 IN | DIASTOLIC BLOOD PRESSURE: 81 MMHG | TEMPERATURE: 97.9 F | BODY MASS INDEX: 33.34 KG/M2

## 2021-05-14 LAB
ABSOLUTE EOS #: 0.16 K/UL (ref 0–0.44)
ABSOLUTE IMMATURE GRANULOCYTE: 0 K/UL (ref 0–0.3)
ABSOLUTE LYMPH #: 2.11 K/UL (ref 1.1–3.7)
ABSOLUTE MONO #: 0.97 K/UL (ref 0.1–1.2)
ANION GAP SERPL CALCULATED.3IONS-SCNC: 8 MMOL/L (ref 9–17)
BASOPHILS # BLD: 0 % (ref 0–2)
BASOPHILS ABSOLUTE: 0 K/UL (ref 0–0.2)
BUN BLDV-MCNC: 12 MG/DL (ref 8–23)
BUN/CREAT BLD: 9 (ref 9–20)
CALCIUM SERPL-MCNC: 8.8 MG/DL (ref 8.6–10.4)
CHLORIDE BLD-SCNC: 106 MMOL/L (ref 98–107)
CO2: 24 MMOL/L (ref 20–31)
CREAT SERPL-MCNC: 1.31 MG/DL (ref 0.7–1.2)
DIFFERENTIAL TYPE: ABNORMAL
EKG ATRIAL RATE: 83 BPM
EKG P AXIS: 61 DEGREES
EKG P-R INTERVAL: 150 MS
EKG Q-T INTERVAL: 374 MS
EKG QRS DURATION: 82 MS
EKG QTC CALCULATION (BAZETT): 439 MS
EKG R AXIS: 43 DEGREES
EKG T AXIS: 44 DEGREES
EKG VENTRICULAR RATE: 83 BPM
EOSINOPHILS RELATIVE PERCENT: 1 % (ref 1–4)
GFR AFRICAN AMERICAN: >60 ML/MIN
GFR NON-AFRICAN AMERICAN: 54 ML/MIN
GFR SERPL CREATININE-BSD FRML MDRD: ABNORMAL ML/MIN/{1.73_M2}
GFR SERPL CREATININE-BSD FRML MDRD: ABNORMAL ML/MIN/{1.73_M2}
GLUCOSE BLD-MCNC: 118 MG/DL (ref 70–99)
HCT VFR BLD CALC: 37.5 % (ref 40.7–50.3)
HEMOGLOBIN: 12.1 G/DL (ref 13–17)
IMMATURE GRANULOCYTES: 0 %
LYMPHOCYTES # BLD: 13 % (ref 24–43)
MCH RBC QN AUTO: 31.8 PG (ref 25.2–33.5)
MCHC RBC AUTO-ENTMCNC: 32.3 G/DL (ref 28.4–34.8)
MCV RBC AUTO: 98.7 FL (ref 82.6–102.9)
MONOCYTES # BLD: 6 % (ref 3–12)
MORPHOLOGY: NORMAL
NRBC AUTOMATED: 0 PER 100 WBC
PDW BLD-RTO: 15.5 % (ref 11.8–14.4)
PLATELET # BLD: 268 K/UL (ref 138–453)
PLATELET ESTIMATE: ABNORMAL
PMV BLD AUTO: 10 FL (ref 8.1–13.5)
POTASSIUM SERPL-SCNC: 4.5 MMOL/L (ref 3.7–5.3)
RBC # BLD: 3.8 M/UL (ref 4.21–5.77)
RBC # BLD: ABNORMAL 10*6/UL
SEG NEUTROPHILS: 80 % (ref 36–65)
SEGMENTED NEUTROPHILS ABSOLUTE COUNT: 12.96 K/UL (ref 1.5–8.1)
SODIUM BLD-SCNC: 138 MMOL/L (ref 135–144)
WBC # BLD: 16.2 K/UL (ref 3.5–11.3)
WBC # BLD: ABNORMAL 10*3/UL

## 2021-05-14 PROCEDURE — 80048 BASIC METABOLIC PNL TOTAL CA: CPT

## 2021-05-14 PROCEDURE — G0378 HOSPITAL OBSERVATION PER HR: HCPCS

## 2021-05-14 PROCEDURE — 36415 COLL VENOUS BLD VENIPUNCTURE: CPT

## 2021-05-14 PROCEDURE — 2580000003 HC RX 258: Performed by: FAMILY MEDICINE

## 2021-05-14 PROCEDURE — 93010 ELECTROCARDIOGRAM REPORT: CPT | Performed by: INTERNAL MEDICINE

## 2021-05-14 PROCEDURE — 85025 COMPLETE CBC W/AUTO DIFF WBC: CPT

## 2021-05-14 PROCEDURE — 94761 N-INVAS EAR/PLS OXIMETRY MLT: CPT

## 2021-05-14 PROCEDURE — 96361 HYDRATE IV INFUSION ADD-ON: CPT

## 2021-05-14 PROCEDURE — 74018 RADEX ABDOMEN 1 VIEW: CPT

## 2021-05-14 PROCEDURE — 6370000000 HC RX 637 (ALT 250 FOR IP): Performed by: UROLOGY

## 2021-05-14 PROCEDURE — 6360000002 HC RX W HCPCS: Performed by: FAMILY MEDICINE

## 2021-05-14 PROCEDURE — 96366 THER/PROPH/DIAG IV INF ADDON: CPT

## 2021-05-14 RX ORDER — CIPROFLOXACIN 500 MG/1
500 TABLET, FILM COATED ORAL 2 TIMES DAILY
Qty: 14 TABLET | Refills: 0 | Status: ON HOLD | OUTPATIENT
Start: 2021-05-14 | End: 2021-05-16 | Stop reason: SDUPTHER

## 2021-05-14 RX ORDER — HYDROCODONE BITARTRATE AND ACETAMINOPHEN 5; 325 MG/1; MG/1
1 TABLET ORAL EVERY 6 HOURS PRN
Qty: 10 TABLET | Refills: 0 | Status: ON HOLD | OUTPATIENT
Start: 2021-05-14 | End: 2021-05-18

## 2021-05-14 RX ORDER — POLYETHYLENE GLYCOL 3350 17 G/17G
17 POWDER, FOR SOLUTION ORAL ONCE
Status: COMPLETED | OUTPATIENT
Start: 2021-05-14 | End: 2021-05-14

## 2021-05-14 RX ORDER — TAMSULOSIN HYDROCHLORIDE 0.4 MG/1
0.4 CAPSULE ORAL DAILY
Qty: 30 CAPSULE | Refills: 0 | Status: ON HOLD | OUTPATIENT
Start: 2021-05-14 | End: 2021-12-06

## 2021-05-14 RX ORDER — ONDANSETRON 4 MG/1
4 TABLET, ORALLY DISINTEGRATING ORAL EVERY 8 HOURS PRN
Qty: 9 TABLET | Refills: 3 | Status: SHIPPED | OUTPATIENT
Start: 2021-05-14 | End: 2021-05-26

## 2021-05-14 RX ADMIN — CIPROFLOXACIN 400 MG: 2 INJECTION, SOLUTION INTRAVENOUS at 08:59

## 2021-05-14 RX ADMIN — POLYETHYLENE GLYCOL 3350 17 G: 17 POWDER, FOR SOLUTION ORAL at 08:03

## 2021-05-14 RX ADMIN — SODIUM CHLORIDE, POTASSIUM CHLORIDE, SODIUM LACTATE AND CALCIUM CHLORIDE: 600; 310; 30; 20 INJECTION, SOLUTION INTRAVENOUS at 08:01

## 2021-05-14 NOTE — PLAN OF CARE
Problem: Infection:  Goal: Will remain free from infection  Description: Will remain free from infection  Outcome: Ongoing  Note: WBC is elevated. Afebrile, will continue to monitor. Problem: Safety:  Goal: Free from accidental physical injury  Description: Free from accidental physical injury  Outcome: Ongoing  Note: Bed in lowest position, wheels are locked, call light within reach, ID band on. Fall risk is assessed. Will continue to monitor. Goal: Free from intentional harm  Description: Free from intentional harm  Outcome: Ongoing     Problem: Daily Care:  Goal: Daily care needs are met  Description: Daily care needs are met  Outcome: Ongoing  Note: Daily care needs are met with assistance with maximum encouragement being given        Problem: Pain:  Goal: Patient's pain/discomfort is manageable  Description: Patient's pain/discomfort is manageable  Outcome: Ongoing  Note: Pain assessed routinely and as needed with a 0-10 scale. PRN pain medication given as appropriate per orders. Pain reassessed within an hour, will continue to monitor       Problem: Skin Integrity:  Goal: Skin integrity will stabilize  Description: Skin integrity will stabilize  Outcome: Ongoing  Note: Patient is able to turn self as needed. No new open areas or redness noted.  Will continue to assess        Problem: Discharge Planning:  Goal: Patients continuum of care needs are met  Description: Patients continuum of care needs are met  Outcome: Ongoing

## 2021-05-14 NOTE — PROGRESS NOTES
Writer placed linens in patients bathroom for when patient is ready to get washed up later this morning.

## 2021-05-14 NOTE — H&P
History and Physical    Patient:  Jeronimo Braun  MRN: [id number]    Chief Complaint: Abdominal pain    History Obtained From:  patient, electronic medical record    PCP: Robyn Harris MD    History of Present Illness: The patient is a 70 y.o. male who presented to the emergency room with complaints of left lower quadrant abdominal pain that had started several days prior but had become progressive. Along with the pain he complained of nausea without vomiting. He denied fever chills. He denied recent illness or diarrhea. He did complain of some shortness of breath which was unusual for him. He denied lower extremity discomfort. He denied melena or hematochezia. During his evaluation the CT scan of the abdomen and pelvis showed a mild left hydronephrosis due to a 6 mm UPJ calculus. His WBC count was elevated at 17.9. He was afebrile. He was provided IV Rocephin as well as IV fluids and admitted for urology consult. During his evaluation this morning his pain is resolved and stated that he went from a 7 to a 0 to a 1 out of 10. He denied dysuria, frequency or hematuria. He denied change in his stream.    Past Medical History:        Diagnosis Date    Arthritis     Hyperlipidemia        Past Surgical History:        Procedure Laterality Date    JOINT REPLACEMENT Left 07/12/2018    knee    [place] TOTAL KNEE ARTHROPLASTY Left 7/12/2018    KNEE TOTAL ARTHROPLASTY performed by Laura Finnegan MD at [place] [address] History:   History reviewed. No pertinent family history. Social History:   TOBACCO:   reports that he quit smoking about 21 years ago. His smoking use included cigarettes. He has never used smokeless tobacco.  ETOH:   reports no history of alcohol use. ELICIT DRUG USE:    Social History     Substance and Sexual Activity   Drug Use No     OCCUPATION: Retired      Allergies:  Patient has no known allergies.     Medications Prior to Admission:    Prior to Admission medications Medication Sig Start Date End Date Taking? Authorizing Provider   aspirin 81 MG chewable tablet Take 1 tablet by mouth 2 times daily 7/13/18   Nithya Arizmendi MD       Review of Systems:  Constitutional:negative  for fevers, and negative for chills. Eyes: negative for visual disturbance   ENT: negative for sore throat, negative nasal congestion, and negative for earache  Respiratory: negative for shortness of breath, negative for cough, and negative for wheezing  Cardiovascular: negative for chest pain, negative for palpitations, and negative for syncope  Gastrointestinal: negative for abdominal pain, negative for nausea,negative for vomiting, negative for diarrhea, negative for constipation, and negative for hematochezia or melena  Genitourinary: negative for dysuria, negative for urinary urgency, negative for urinary frequency, and negative for hematuria  Skin: negative for skin rash, and negative for skin lesions  Neurological: negative for unilateral weakness, numbness or tingling. Physical Exam:    Vitals:   Temp: 97.9 °F (36.6 °C)  BP: (!) 154/81  Resp: 16  Pulse: 71  SpO2: 96 %  24HR INTAKE/OUTPUT:      Intake/Output Summary (Last 24 hours) at 5/14/2021 1036  Last data filed at 5/14/2021 0900  Gross per 24 hour   Intake 908 ml   Output 225 ml   Net 683 ml       Exam:  GEN:  alert and oriented to person, place and time, well-developed and well-nourished, in no acute distress  EYES: No gross abnormalities. , PERRL and EOMI  NECK: normal, supple, no lymphadenopathy,  no carotid bruits  PULM: clear to auscultation bilaterally- no wheezes, rales or rhonchi, normal air movement, no respiratory distress  COR: regular rate & rhythm, no murmurs, no gallops, S1 normal and S2 normal  ABD:  soft, non-tender, non-distended, normal bowel sounds, no masses or organomegaly  EXT:   no cyanosis, clubbing or edema present    NEURO: follows commands, ESTRADA, no deficits  SKIN:  no rashes or significant lesions  -----------------------------------------------------------------  Diagnostic Data:   Lab Results   Component Value Date    WBC 16.2 (H) 05/14/2021    HGB 12.1 (L) 05/14/2021     05/14/2021       Lab Results   Component Value Date    BUN 12 05/14/2021    CREATININE 1.31 (H) 05/14/2021     05/14/2021    K 4.5 05/14/2021    CALCIUM 8.8 05/14/2021     05/14/2021    CO2 24 05/14/2021    LABGLOM 54 (L) 05/14/2021       Lab Results   Component Value Date    WBCUA None 05/13/2021    RBCUA 0 TO 2 05/13/2021    EPITHUA 0 TO 2 05/13/2021    LEUKOCYTESUR NEGATIVE 05/13/2021    SPECGRAV 1.020 05/13/2021    GLUCOSEU NEGATIVE 05/13/2021    KETUA 1+ (A) 05/13/2021    PROTEINU NEGATIVE 05/13/2021    HGBUR NEGATIVE 05/13/2021    CASTUA NOT REPORTED 05/13/2021    CRYSTUA NOT REPORTED 05/13/2021    BACTERIA NOT REPORTED 05/13/2021    YEAST NOT REPORTED 05/13/2021       Lab Results   Component Value Date    TROPONINT NOT REPORTED 05/13/2021    PROBNP 147 05/13/2021       Xr Chest (2 Vw)    Result Date: 5/13/2021  EXAMINATION: TWO XRAY VIEWS OF THE CHEST 5/13/2021 4:55 pm COMPARISON: April 13, 2016. HISTORY: ORDERING SYSTEM PROVIDED HISTORY: SOB, Abdominal pain, leukocytosis TECHNOLOGIST PROVIDED HISTORY: SOB, Abdominal pain, leukocytosis FINDINGS: The cardiomediastinal silhouette is normal in size. The lungs are clear. No pleural effusion or pneumothorax. No subdiaphragmatic free air is present. No acute cardiopulmonary process. Xr Abdomen (kub) (single Ap View)    Result Date: 5/14/2021  EXAMINATION: ONE SUPINE XRAY VIEW(S) OF THE ABDOMEN 5/14/2021 7:26 am COMPARISON: CT 05/13/2021 HISTORY: ORDERING SYSTEM PROVIDED HISTORY: ureteral calculus TECHNOLOGIST PROVIDED HISTORY: ureteral calculus FINDINGS: Normal bowel gas pattern. The 6 mm left ureter calculus noted on the CT scan is not radiographically apparent. There are pelvic phleboliths. IV contrast opacifies the urinary bladder.   Degenerative changes in the bones. The known left ureteric calculus is not apparent on the x-ray. Ct Abdomen Pelvis W Iv Contrast Additional Contrast? None    Result Date: 5/13/2021  EXAMINATION: CT OF THE ABDOMEN AND PELVIS WITH CONTRAST 5/13/2021 3:49 pm TECHNIQUE: CT of the abdomen and pelvis was performed with the administration of intravenous contrast. Multiplanar reformatted images are provided for review. Dose modulation, iterative reconstruction, and/or weight based adjustment of the mA/kV was utilized to reduce the radiation dose to as low as reasonably achievable. COMPARISON: None. HISTORY: ORDERING SYSTEM PROVIDED HISTORY: [facility] pain TECHNOLOGIST PROVIDED HISTORY: [facility] pain Decision Support Exception - unselect if not a suspected or confirmed emergency medical condition->Emergency Medical Condition (MA) FINDINGS: Lower Chest: Heart size is normal.  The visualized lung bases are clear. Organs: There is mild left hydronephrosis due to a 6-mm calculus positioned at the level of the ureteropelvic junction. No renal, bladder, or right ureteral calculi. No right hydronephrosis or hydroureter. A small benign right renal cortical cyst is present. The liver, spleen, pancreas, gallbladder, kidneys, and adrenal glands otherwise appear normal. GI/Bowel: There is fluid filled opacification of the proximal and mid colon which is not abnormally distended and without significant bowel wall thickening. A few scattered diverticula are present throughout the sigmoid colon. The stomach and the small and large bowel loops are otherwise normal in appearance. No acute intestinal abnormality. Peritoneum/Retroperitoneum: There is no free fluid or extraluminal gas. No enlarged or suspicious mesenteric or retroperitoneal lymphadenopathy. The abdominal aorta and iliac arteries are normal in caliber. Pelvis: No pelvic free fluid or enlarged or suspicious pelvic or inguinal lymphadenopathy. The prostate gland is not enlarged.

## 2021-05-14 NOTE — PROGRESS NOTES
Discharge instructions reviewed with patient. Discussed straining urine; sent with strainers and specimen cup. No questions at this time. Patient d/c'd off unit ambulatory by self to home. Belongings in hand. No issues noted.

## 2021-05-14 NOTE — CARE COORDINATION
Spoke with Pt in regards to a follow up appt with a PCP, since Dr Ricardo Reed has retired. States he and his wife were in [place] for the winter and returned about a month ago so he hasn't needed to go to the Dr yet. States they may stay with Dr Dontrell Duncan, but he did ask about other options for PCP in [place]. Discussed several CNP options for care in [place]. States he and his wife will discuss. Will give list with options for such. Verbalized understanding.    MIKE Reis RN

## 2021-05-14 NOTE — DISCHARGE SUMMARY
am COMPARISON: CT 05/13/2021 HISTORY: ORDERING SYSTEM PROVIDED HISTORY: ureteral calculus TECHNOLOGIST PROVIDED HISTORY: ureteral calculus FINDINGS: Normal bowel gas pattern. The 6 mm left ureter calculus noted on the CT scan is not radiographically apparent. There are pelvic phleboliths. IV contrast opacifies the urinary bladder. Degenerative changes in the bones. The known left ureteric calculus is not apparent on the x-ray. Ct Abdomen Pelvis W Iv Contrast Additional Contrast? None    Result Date: 5/13/2021  EXAMINATION: CT OF THE ABDOMEN AND PELVIS WITH CONTRAST 5/13/2021 3:49 pm TECHNIQUE: CT of the abdomen and pelvis was performed with the administration of intravenous contrast. Multiplanar reformatted images are provided for review. Dose modulation, iterative reconstruction, and/or weight based adjustment of the mA/kV was utilized to reduce the radiation dose to as low as reasonably achievable. COMPARISON: None. HISTORY: ORDERING SYSTEM PROVIDED HISTORY: [facility] abd pain TECHNOLOGIST PROVIDED HISTORY: [facility] pain Decision Support Exception - unselect if not a suspected or confirmed emergency medical condition->Emergency Medical Condition (MA) FINDINGS: Lower Chest: Heart size is normal.  The visualized lung bases are clear. Organs: There is mild left hydronephrosis due to a 6-mm calculus positioned at the level of the ureteropelvic junction. No renal, bladder, or right ureteral calculi. No right hydronephrosis or hydroureter. A small benign right renal cortical cyst is present. The liver, spleen, pancreas, gallbladder, kidneys, and adrenal glands otherwise appear normal. GI/Bowel: There is fluid filled opacification of the proximal and mid colon which is not abnormally distended and without significant bowel wall thickening. A few scattered diverticula are present throughout the sigmoid colon. The stomach and the small and large bowel loops are otherwise normal in appearance.   No acute intestinal abnormality. Peritoneum/Retroperitoneum: There is no free fluid or extraluminal gas. No enlarged or suspicious mesenteric or retroperitoneal lymphadenopathy. The abdominal aorta and iliac arteries are normal in caliber. Pelvis: No pelvic free fluid or enlarged or suspicious pelvic or inguinal lymphadenopathy. The prostate gland is not enlarged. The urinary bladder and the pelvic bowel loops are unremarkable. Bones/Soft Tissues: Degenerative changes are present throughout the lumbar spine and hips. No acute fracture. Mild left hydronephrosis due to a 6-mm UPJ calculus. Assessment and Plan:  Patient Active Problem List    Diagnosis Date Noted    Hydronephrosis with urinary obstruction due to ureteral calculus 05/13/2021    Primary osteoarthritis of left knee 07/12/2018        Discharge Medications:       Loyce Blizzard \"MILKA\"   Home Medication Instructions CLAYTONK:[id number]    Printed on:05/14/21 0969   Medication Information                      aspirin 81 MG chewable tablet  Take 1 tablet by mouth 2 times daily             ciprofloxacin (CIPRO) 500 MG tablet  Take 1 tablet by mouth 2 times daily for 7 days             HYDROcodone-acetaminophen (NORCO) 5-325 MG per tablet  Take 1 tablet by mouth every 6 hours as needed for Pain for up to 3 days. Intended supply: 3 days. Take lowest dose possible to manage pain             ondansetron (ZOFRAN ODT) 4 MG disintegrating tablet  Take 1 tablet by mouth every 8 hours as needed for Nausea or Vomiting             tamsulosin (FLOMAX) 0.4 MG capsule  Take 1 capsule by mouth daily                 Patient Instructions:    Activity: activity as tolerated  Diet: encourage fluids  Wound Care: none needed  Other: None    Disposition:   Discharge to Home    Follow up:  Patient will be followed by Virgil Franco MD in 1-2 weeks    CORE MEASURES on Discharge (if applicable)  ACE/ARB in CHF: NA  Statin in MI: NA  ASA in MI: NA  Statin in CVA: NA  Antiplatelet in CVA: NA    Total time spent on discharge services: 35 minutes    Including the following activities:  Evaluation and Management of patient  Discussion with patient and/or surrogate about current care plan  Coordination with Case Management and/or   Coordination of care with Consultants (if applicable)   Coordination of care with Receiving Facility Physician (if applicable)  Completion of DME forms (if applicable)  Preparation of Discharge Summary  Preparation of Medication Reconciliation  Preparation of Discharge Prescriptions    Signed:  SHEYLA Dao, NP-C  5/14/2021, 10:46 AM

## 2021-05-14 NOTE — CONSULTS
Urology Consultation    Patient:  Lucia Mendez  MRN: [id number]  YOB: 1950    CHIEF COMPLAINT: Left flank pain    HISTORY OF PRESENT ILLNESS:   The patient is a 70 y.o. male who presents with left flank pain. Patient was admitted to the hospital last night through the emergency department. Patient did get a CT scan upon admittance to the emergency department. This film was independently reviewed. This does show a 5 mm stone in the proximal left ureter. Patient was admitted to the floor. He did have an elevated white count of 19,000. Patient was given IV antibiotics and fluids overnight. Patient's laboratory values have improved. His left flank pain has resolved. Patient has never had stones in the past.  He has never seen urology in the past.  Currently he is pain-free. He is concerned about not having a bowel movement. I did review his KUB done this morning. This did show heavy bowel contents and no stone per se. Patient's old records, notes and chart reviewed and summarized above. Past Medical History:    Past Medical History:   Diagnosis Date    Arthritis     Hyperlipidemia        Past Surgical History:    Past Surgical History:   Procedure Laterality Date    JOINT REPLACEMENT Left 07/12/2018    knee    [place] TOTAL KNEE ARTHROPLASTY Left 7/12/2018    KNEE TOTAL ARTHROPLASTY performed by Karine Valdes MD at [address]     Previous  surgery: none     Medications:    Scheduled Meds:   sodium chloride flush  5-40 mL Intravenous 2 times per day    enoxaparin  40 mg Subcutaneous Daily    ciprofloxacin  400 mg Intravenous Q12H     Continuous Infusions:   lactated ringers 125 mL/hr at 05/13/21 2201    sodium chloride       PRN Meds:.sodium chloride flush, sodium chloride, potassium chloride **OR** potassium alternative oral replacement **OR** potassium chloride, ondansetron **OR** ondansetron, morphine    Allergies:  Patient has no known allergies.     Social History: Social History     Socioeconomic History    Marital status:      Spouse name: Not on file    Number of children: Not on file    Years of education: Not on file    Highest education level: Not on file   Occupational History    Not on file   Social Needs    Financial resource strain: Not on file    Food insecurity     Worry: Not on file     Inability: Not on file    Transportation needs     Medical: Not on file     Non-medical: Not on file   Tobacco Use    Smoking status: Former Smoker     Types: Cigarettes     Quit date:      Years since quittin.3    Smokeless tobacco: Never Used   Substance and Sexual Activity    Alcohol use: No    Drug use: No    Sexual activity: Not on file   Lifestyle    Physical activity     Days per week: Not on file     Minutes per session: Not on file    Stress: Not on file   Relationships    Social connections     Talks on phone: Not on file     Gets together: Not on file     Attends [employer] service: Not on file     Active member of club or organization: Not on file     Attends meetings of clubs or organizations: Not on file     Relationship status: Not on file    Intimate partner violence     Fear of current or ex partner: Not on file     Emotionally abused: Not on file     Physically abused: Not on file     Forced sexual activity: Not on file   Other Topics Concern    Not on file   Social History Narrative    Not on file       Family History:  History reviewed. No pertinent family history.   Previous Urologic Family history: none    REVIEW OF SYSTEMS:  Constitutional: negative  Eyes: negative  Respiratory: negative  Cardiovascular: negative  Gastrointestinal: negative  Genitourinary: see HPI  Musculoskeletal: negative  Skin: negative   Neurological: negative  Hematological/Lymphatic: negative  Psychological: negative    Physical Exam:    This a 70 y.o. male   Patient Vitals for the past 24 hrs:   BP Temp Temp src Pulse Resp SpO2 Height Weight 05/14/21 0445 -- -- -- -- -- -- -- 220 lb (99.8 kg)   05/14/21 0141 (!) 112/94 97.3 °F (36.3 °C) Temporal 71 16 96 % -- --   05/13/21 2115 -- -- -- -- -- 97 % -- --   05/13/21 1955 (!) 147/85 98.4 °F (36.9 °C) Temporal 80 16 97 % 5' 8\" (1.727 m) 218 lb (98.9 kg)   05/13/21 1830 (!) 144/88 -- -- 83 21 -- -- --   05/13/21 1800 (!) 165/79 -- -- 82 19 -- -- --   05/13/21 1730 (!) 171/91 -- -- 93 18 94 % -- --   05/13/21 1721 (!) 166/90 -- -- 84 21 96 % -- --   05/13/21 1701 -- -- -- 88 16 98 % -- --   05/13/21 1630 (!) 150/84 -- -- 77 19 96 % -- --   05/13/21 1601 (!) 182/81 -- -- 80 22 96 % -- --   05/13/21 1547 (!) 151/85 -- -- -- -- -- -- --   05/13/21 1543 -- 98.6 °F (37 °C) Tympanic 80 18 95 % 5' 8\" (1.727 m) 208 lb (94.3 kg)     Constitutional: Patient in no acute distress; Neuro: alert and oriented to person place and time. Psych: Mood and affect normal.  Skin: Normal  Lungs: Respiratory effort normal  Cardiovascular:  Normal peripheral pulses  Abdomen: Soft, non-tender, non-distended with no CVA, flank pain, hepatosplenomegaly or hernia. Kidneys normal.  Bladder non-tender and not distended. Lymphatics: no palpable lymphadenopathy  Penis normal and circumcised  Urethral meatus normal  Scrotal exam normal  Testicles normal bilaterally  Epididymis normal bilaterally  No evidence of inguinal hernia  Anus and perineum normal  Normal rectal tone with no masses  Prostate soft, non-tender to palpation. No palpable nodules. Estimated 40 grams.    LABS:  Recent Labs     05/13/21  1555 05/14/21  0525   WBC 17.9* 16.2*   HGB 13.6 12.1*   HCT 40.6* 37.5*   MCV 96.0 98.7    268     Recent Labs     05/13/21  1555 05/14/21  0525    138   K 4.5 4.5    106   CO2 27 24   BUN 15 12   CREATININE 1.56* 1.31*     No results found for: PSA    Additional Lab/culture results:    Urinalysis:   Recent Labs     05/13/21  1810   COLORU YELLOW   PHUR 6.5   WBCUA None   RBCUA 0 TO 2   MUCUS NOT REPORTED

## 2021-05-14 NOTE — PROGRESS NOTES
Call received from Dr. Willy Sharma at this time; updated on patient's improvements. Will do KUB d/t possible passing of calculus overnight, to hopefully avoid sx.

## 2021-05-14 NOTE — PROGRESS NOTES
Discussed discharge plans with the patient. Patient is a 70year old male here with Ureterolithiasis. He is alert and oriented. Patient is  and lives at home with his wife. He uses no medical equipment. The patient's wife does the cooking and the cleaning. He is independent with his ADL's. Patient manages his own medication and drives. He has no outside services in the home. His PCP has retired and has not seen the new PCP that has taken previous PCP practice. He has medical insurance that helps with medication costs. Patient served in [employer]. The discharge plan is home with no services. He has advance directives but they are not on file. GAYLEW to monitor and assist with any needs or concerns as they arise.     MINDI Conti

## 2021-05-15 ENCOUNTER — HOSPITAL ENCOUNTER (INPATIENT)
Age: 71
LOS: 1 days | Discharge: HOME | DRG: 694 | End: 2021-05-16
Attending: EMERGENCY MEDICINE | Admitting: INTERNAL MEDICINE
Payer: MEDICARE

## 2021-05-15 ENCOUNTER — APPOINTMENT (OUTPATIENT)
Dept: CT IMAGING | Age: 71
DRG: 694 | End: 2021-05-15
Payer: MEDICARE

## 2021-05-15 DIAGNOSIS — R10.9 LEFT FLANK PAIN: ICD-10-CM

## 2021-05-15 DIAGNOSIS — D72.829 LEUKOCYTOSIS, UNSPECIFIED TYPE: ICD-10-CM

## 2021-05-15 DIAGNOSIS — N20.1 OBSTRUCTION OF LEFT URETEROPELVIC JUNCTION DUE TO STONE: Primary | ICD-10-CM

## 2021-05-15 PROBLEM — N20.0 URINARY TRACT OBSTRUCTION DUE TO KIDNEY STONE: Status: ACTIVE | Noted: 2021-05-15

## 2021-05-15 PROBLEM — N13.8 URINARY TRACT OBSTRUCTION DUE TO KIDNEY STONE: Status: ACTIVE | Noted: 2021-05-15

## 2021-05-15 LAB
-: ABNORMAL
ABSOLUTE EOS #: 0 K/UL (ref 0–0.44)
ABSOLUTE IMMATURE GRANULOCYTE: 0 K/UL (ref 0–0.3)
ABSOLUTE LYMPH #: 2.4 K/UL (ref 1.1–3.7)
ABSOLUTE MONO #: 0.96 K/UL (ref 0.1–1.2)
AMORPHOUS: ABNORMAL
ANION GAP SERPL CALCULATED.3IONS-SCNC: 10 MMOL/L (ref 9–17)
BACTERIA: ABNORMAL
BASOPHILS # BLD: 1 % (ref 0–2)
BASOPHILS ABSOLUTE: 0.16 K/UL (ref 0–0.2)
BILIRUBIN URINE: NEGATIVE
BUN BLDV-MCNC: 18 MG/DL (ref 8–23)
BUN/CREAT BLD: 10 (ref 9–20)
CALCIUM SERPL-MCNC: 9.2 MG/DL (ref 8.6–10.4)
CASTS UA: ABNORMAL /LPF
CHLORIDE BLD-SCNC: 103 MMOL/L (ref 98–107)
CO2: 25 MMOL/L (ref 20–31)
COLOR: YELLOW
COMMENT UA: ABNORMAL
CREAT SERPL-MCNC: 1.78 MG/DL (ref 0.7–1.2)
CRYSTALS, UA: ABNORMAL /HPF
DIFFERENTIAL TYPE: ABNORMAL
EOSINOPHILS RELATIVE PERCENT: 0 % (ref 1–4)
EPITHELIAL CELLS UA: ABNORMAL /HPF (ref 0–5)
GFR AFRICAN AMERICAN: 46 ML/MIN
GFR NON-AFRICAN AMERICAN: 38 ML/MIN
GFR SERPL CREATININE-BSD FRML MDRD: ABNORMAL ML/MIN/{1.73_M2}
GFR SERPL CREATININE-BSD FRML MDRD: ABNORMAL ML/MIN/{1.73_M2}
GLUCOSE BLD-MCNC: 152 MG/DL (ref 70–99)
GLUCOSE URINE: NEGATIVE
HCT VFR BLD CALC: 38.7 % (ref 40.7–50.3)
HEMOGLOBIN: 12.4 G/DL (ref 13–17)
IMMATURE GRANULOCYTES: 0 %
KETONES, URINE: ABNORMAL
LEUKOCYTE ESTERASE, URINE: NEGATIVE
LIPASE: 22 U/L (ref 13–60)
LYMPHOCYTES # BLD: 15 % (ref 24–43)
MCH RBC QN AUTO: 31.9 PG (ref 25.2–33.5)
MCHC RBC AUTO-ENTMCNC: 32 G/DL (ref 28.4–34.8)
MCV RBC AUTO: 99.5 FL (ref 82.6–102.9)
MONOCYTES # BLD: 6 % (ref 3–12)
MORPHOLOGY: NORMAL
MUCUS: ABNORMAL
NITRITE, URINE: NEGATIVE
NRBC AUTOMATED: 0 PER 100 WBC
OTHER OBSERVATIONS UA: ABNORMAL
PDW BLD-RTO: 15 % (ref 11.8–14.4)
PH UA: 5.5 (ref 5–9)
PLATELET # BLD: 255 K/UL (ref 138–453)
PLATELET ESTIMATE: ABNORMAL
PMV BLD AUTO: 9.2 FL (ref 8.1–13.5)
POTASSIUM SERPL-SCNC: 4.6 MMOL/L (ref 3.7–5.3)
PROTEIN UA: NEGATIVE
RBC # BLD: 3.89 M/UL (ref 4.21–5.77)
RBC # BLD: ABNORMAL 10*6/UL
RBC UA: ABNORMAL /HPF (ref 0–2)
RENAL EPITHELIAL, UA: ABNORMAL /HPF
SEG NEUTROPHILS: 78 % (ref 36–65)
SEGMENTED NEUTROPHILS ABSOLUTE COUNT: 12.48 K/UL (ref 1.5–8.1)
SODIUM BLD-SCNC: 138 MMOL/L (ref 135–144)
SPECIFIC GRAVITY UA: >1.03 (ref 1.01–1.02)
TRICHOMONAS: ABNORMAL
TURBIDITY: CLEAR
URINE HGB: NEGATIVE
UROBILINOGEN, URINE: NORMAL
WBC # BLD: 16 K/UL (ref 3.5–11.3)
WBC # BLD: ABNORMAL 10*3/UL
WBC UA: ABNORMAL /HPF (ref 0–5)
YEAST: ABNORMAL

## 2021-05-15 PROCEDURE — 1200000000 HC SEMI PRIVATE

## 2021-05-15 PROCEDURE — 81001 URINALYSIS AUTO W/SCOPE: CPT

## 2021-05-15 PROCEDURE — 2580000003 HC RX 258: Performed by: INTERNAL MEDICINE

## 2021-05-15 PROCEDURE — 74176 CT ABD & PELVIS W/O CONTRAST: CPT

## 2021-05-15 PROCEDURE — 2580000003 HC RX 258: Performed by: EMERGENCY MEDICINE

## 2021-05-15 PROCEDURE — 99284 EMERGENCY DEPT VISIT MOD MDM: CPT

## 2021-05-15 PROCEDURE — 85025 COMPLETE CBC W/AUTO DIFF WBC: CPT

## 2021-05-15 PROCEDURE — 36415 COLL VENOUS BLD VENIPUNCTURE: CPT

## 2021-05-15 PROCEDURE — 6360000002 HC RX W HCPCS: Performed by: EMERGENCY MEDICINE

## 2021-05-15 PROCEDURE — 83690 ASSAY OF LIPASE: CPT

## 2021-05-15 PROCEDURE — 94761 N-INVAS EAR/PLS OXIMETRY MLT: CPT

## 2021-05-15 PROCEDURE — 96375 TX/PRO/DX INJ NEW DRUG ADDON: CPT

## 2021-05-15 PROCEDURE — 96374 THER/PROPH/DIAG INJ IV PUSH: CPT

## 2021-05-15 PROCEDURE — 6370000000 HC RX 637 (ALT 250 FOR IP): Performed by: INTERNAL MEDICINE

## 2021-05-15 PROCEDURE — 87086 URINE CULTURE/COLONY COUNT: CPT

## 2021-05-15 PROCEDURE — 80048 BASIC METABOLIC PNL TOTAL CA: CPT

## 2021-05-15 RX ORDER — MORPHINE SULFATE 2 MG/ML
2 INJECTION, SOLUTION INTRAMUSCULAR; INTRAVENOUS ONCE
Status: COMPLETED | OUTPATIENT
Start: 2021-05-15 | End: 2021-05-15

## 2021-05-15 RX ORDER — HYDROCODONE BITARTRATE AND ACETAMINOPHEN 5; 325 MG/1; MG/1
1 TABLET ORAL EVERY 6 HOURS PRN
Status: DISCONTINUED | OUTPATIENT
Start: 2021-05-15 | End: 2021-05-16 | Stop reason: HOSPADM

## 2021-05-15 RX ORDER — SODIUM CHLORIDE 0.9 % (FLUSH) 0.9 %
10 SYRINGE (ML) INJECTION PRN
Status: DISCONTINUED | OUTPATIENT
Start: 2021-05-15 | End: 2021-05-16 | Stop reason: HOSPADM

## 2021-05-15 RX ORDER — ACETAMINOPHEN 325 MG/1
650 TABLET ORAL EVERY 6 HOURS PRN
Status: DISCONTINUED | OUTPATIENT
Start: 2021-05-15 | End: 2021-05-16 | Stop reason: HOSPADM

## 2021-05-15 RX ORDER — TAMSULOSIN HYDROCHLORIDE 0.4 MG/1
0.4 CAPSULE ORAL NIGHTLY
Status: DISCONTINUED | OUTPATIENT
Start: 2021-05-15 | End: 2021-05-16 | Stop reason: HOSPADM

## 2021-05-15 RX ORDER — ACETAMINOPHEN 650 MG/1
650 SUPPOSITORY RECTAL EVERY 6 HOURS PRN
Status: DISCONTINUED | OUTPATIENT
Start: 2021-05-15 | End: 2021-05-16 | Stop reason: HOSPADM

## 2021-05-15 RX ORDER — SODIUM CHLORIDE 9 MG/ML
25 INJECTION, SOLUTION INTRAVENOUS PRN
Status: DISCONTINUED | OUTPATIENT
Start: 2021-05-15 | End: 2021-05-16 | Stop reason: HOSPADM

## 2021-05-15 RX ORDER — SODIUM CHLORIDE 9 MG/ML
1000 INJECTION, SOLUTION INTRAVENOUS CONTINUOUS
Status: DISCONTINUED | OUTPATIENT
Start: 2021-05-15 | End: 2021-05-16 | Stop reason: HOSPADM

## 2021-05-15 RX ORDER — SODIUM CHLORIDE 0.9 % (FLUSH) 0.9 %
5-40 SYRINGE (ML) INJECTION EVERY 12 HOURS SCHEDULED
Status: DISCONTINUED | OUTPATIENT
Start: 2021-05-15 | End: 2021-05-16 | Stop reason: HOSPADM

## 2021-05-15 RX ORDER — POLYETHYLENE GLYCOL 3350 17 G/17G
17 POWDER, FOR SOLUTION ORAL DAILY PRN
Status: DISCONTINUED | OUTPATIENT
Start: 2021-05-15 | End: 2021-05-16 | Stop reason: HOSPADM

## 2021-05-15 RX ORDER — SODIUM CHLORIDE 9 MG/ML
INJECTION, SOLUTION INTRAVENOUS CONTINUOUS
Status: DISCONTINUED | OUTPATIENT
Start: 2021-05-15 | End: 2021-05-16 | Stop reason: HOSPADM

## 2021-05-15 RX ORDER — PROMETHAZINE HYDROCHLORIDE 25 MG/1
12.5 TABLET ORAL EVERY 6 HOURS PRN
Status: DISCONTINUED | OUTPATIENT
Start: 2021-05-15 | End: 2021-05-16 | Stop reason: HOSPADM

## 2021-05-15 RX ORDER — TAMSULOSIN HYDROCHLORIDE 0.4 MG/1
0.4 CAPSULE ORAL DAILY
Status: DISCONTINUED | OUTPATIENT
Start: 2021-05-16 | End: 2021-05-15

## 2021-05-15 RX ORDER — ONDANSETRON 2 MG/ML
4 INJECTION INTRAMUSCULAR; INTRAVENOUS ONCE
Status: COMPLETED | OUTPATIENT
Start: 2021-05-15 | End: 2021-05-15

## 2021-05-15 RX ORDER — FENTANYL CITRATE 50 UG/ML
25 INJECTION, SOLUTION INTRAMUSCULAR; INTRAVENOUS ONCE
Status: COMPLETED | OUTPATIENT
Start: 2021-05-15 | End: 2021-05-15

## 2021-05-15 RX ORDER — ONDANSETRON 2 MG/ML
4 INJECTION INTRAMUSCULAR; INTRAVENOUS EVERY 6 HOURS PRN
Status: DISCONTINUED | OUTPATIENT
Start: 2021-05-15 | End: 2021-05-16 | Stop reason: HOSPADM

## 2021-05-15 RX ADMIN — ONDANSETRON 4 MG: 2 INJECTION INTRAMUSCULAR; INTRAVENOUS at 17:17

## 2021-05-15 RX ADMIN — SODIUM CHLORIDE, PRESERVATIVE FREE 10 ML: 5 INJECTION INTRAVENOUS at 22:11

## 2021-05-15 RX ADMIN — WATER 1000 MG: 1 INJECTION INTRAMUSCULAR; INTRAVENOUS; SUBCUTANEOUS at 21:38

## 2021-05-15 RX ADMIN — FENTANYL CITRATE 25 MCG: 50 INJECTION, SOLUTION INTRAMUSCULAR; INTRAVENOUS at 17:17

## 2021-05-15 RX ADMIN — MORPHINE SULFATE 2 MG: 2 INJECTION, SOLUTION INTRAMUSCULAR; INTRAVENOUS at 17:43

## 2021-05-15 RX ADMIN — SODIUM CHLORIDE 1000 ML: 9 INJECTION, SOLUTION INTRAVENOUS at 17:16

## 2021-05-15 RX ADMIN — TAMSULOSIN HYDROCHLORIDE 0.4 MG: 0.4 CAPSULE ORAL at 22:18

## 2021-05-15 RX ADMIN — SODIUM CHLORIDE: 9 INJECTION, SOLUTION INTRAVENOUS at 22:11

## 2021-05-15 ASSESSMENT — PAIN SCALES - GENERAL
PAINLEVEL_OUTOF10: 10
PAINLEVEL_OUTOF10: 10
PAINLEVEL_OUTOF10: 1

## 2021-05-15 ASSESSMENT — ENCOUNTER SYMPTOMS
NAUSEA: 1
BLOOD IN STOOL: 0
RESPIRATORY NEGATIVE: 1
EYES NEGATIVE: 1
VOMITING: 0
ALLERGIC/IMMUNOLOGIC NEGATIVE: 1
RECTAL PAIN: 0
ABDOMINAL PAIN: 1

## 2021-05-15 ASSESSMENT — PAIN DESCRIPTION - LOCATION: LOCATION: FLANK

## 2021-05-15 ASSESSMENT — PAIN DESCRIPTION - ORIENTATION: ORIENTATION: LEFT

## 2021-05-15 ASSESSMENT — PAIN - FUNCTIONAL ASSESSMENT: PAIN_FUNCTIONAL_ASSESSMENT: PREVENTS OR INTERFERES SOME ACTIVE ACTIVITIES AND ADLS

## 2021-05-15 ASSESSMENT — PAIN DESCRIPTION - DESCRIPTORS: DESCRIPTORS: SHARP

## 2021-05-15 ASSESSMENT — PAIN DESCRIPTION - FREQUENCY: FREQUENCY: INTERMITTENT

## 2021-05-15 NOTE — ED PROVIDER NOTES
Counts include 234 beds at the [facility] AT [facility] MED SURG  EMERGENCY DEPARTMENT ENCOUNTER      Pt Name: Kaylee Riedel  MRN: [id number]  [place] 1950  Date of evaluation: 5/15/2021  Provider: Tommy Hanley MD    CHIEF COMPLAINT       Chief Complaint   Patient presents with    Flank Pain     left flank pain, recently discharged for kidney stone         HISTORY OF PRESENT ILLNESS   (Location/Symptom, Timing/Onset, Context/Setting, Quality, Duration, Modifying Factors, Severity)  Note limiting factors. Kaylee Riedel is a 70 y.o. male who presents to the emergency department     59-year-old patient presents emergency department with history for left flank pain which began on the morning of ED arrival.  Patient rates the discomfort as 9 out of 10. No obvious precipitating or alleviating factors. Patient relates on Thursday 2 days prior to ED arrival patient was admitted to the hospital diagnosed with a kidney stone was observed overnight discomfort had subsided. The  patient was able to have bowel movements. Patient was discharged 1 day prior to ED arrival.  He denies any history for trauma denies any vomiting. Denies any previous history for surgeries involving his abdomen. Nursing Notes were reviewed. REVIEW OF SYSTEMS    (2-9 systems for level 4, 10 or more for level 5)     Review of Systems   Constitutional: Negative. HENT: Negative. Eyes: Negative. Respiratory: Negative. Cardiovascular: Negative. Gastrointestinal: Positive for abdominal pain and nausea. Negative for blood in stool, rectal pain and vomiting. Left flank pain   Endocrine: Negative. Genitourinary: Positive for flank pain. Skin: Negative. Allergic/Immunologic: Negative. Neurological: Negative. Hematological: Negative. Except as noted above the remainder of the review of systems was reviewed and negative.        PAST MEDICAL HISTORY     Past Medical History:   Diagnosis Date    Arthritis     Hyperlipidemia Minutes of Exercise per Session:    Stress:     Feeling of Stress :    Social Connections:     Frequency of Communication with Friends and Family:     Frequency of Social Gatherings with Friends and Family:     Attends Sikhism Services:     Active Member of Clubs or Organizations:     Attends Club or Organization Meetings:     Marital Status:    Intimate Partner Violence:     Fear of Current or Ex-Partner:     Emotionally Abused:     Physically Abused:     Sexually Abused:        SCREENINGS        Admire Coma Scale  Eye Opening: Spontaneous  Best Verbal Response: Oriented  Best Motor Response: Obeys commands  Admire Coma Scale Score: 15               PHYSICAL EXAM    (up to 7 for level 4, 8 or more for level 5)     ED Triage Vitals [05/15/21 1638]   BP Temp Temp Source Pulse Resp SpO2 Height Weight   (!) 124/107 99.2 °F (37.3 °C) Tympanic 79 18 97 % -- --       Physical Exam  Vitals and nursing note reviewed. Constitutional:       General: He is not in acute distress. Appearance: Normal appearance. He is not ill-appearing, toxic-appearing or diaphoretic. Comments: Uncomfortable   HENT:      Head: Normocephalic and atraumatic. Nose: Nose normal.      Mouth/Throat:      Mouth: Mucous membranes are moist.   Eyes:      Extraocular Movements: Extraocular movements intact. Cardiovascular:      Rate and Rhythm: Normal rate and regular rhythm. Pulses: Normal pulses. Heart sounds: Normal heart sounds. Pulmonary:      Effort: Pulmonary effort is normal.      Breath sounds: Normal breath sounds. Abdominal:      General: Abdomen is flat. Tenderness: There is abdominal tenderness (Left lower region without pulsatile masses). There is left CVA tenderness. There is no rebound. Musculoskeletal:         General: Normal range of motion. Cervical back: Normal range of motion. No rigidity. Right lower leg: No edema. Left lower leg: No edema.    Lymphadenopathy: Cervical: No cervical adenopathy. Skin:     General: Skin is warm. Capillary Refill: Capillary refill takes less than 2 seconds. Findings: No lesion or rash. Neurological:      General: No focal deficit present. Mental Status: He is alert. DIAGNOSTIC RESULTS     EKG: All EKG's are interpreted by the Emergency Department Physician who either signs or Co-signs this chart in the absence of a cardiologist.      RADIOLOGY:   Non-plain film images such as CT, Ultrasound and MRI are read by the radiologist. Plain radiographic images are visualized and preliminarily interpreted by the emergency physician with the below findings:      Interpretation per the Radiologist below, if available at the time of this note:    CT ABDOMEN PELVIS WO CONTRAST Additional Contrast? None   Final Result   4 mm obstructing stone of the left proximal ureter is associated with   moderate left-sided hydroureteronephrosis and moderate left-sided perinephric   stranding/minimal amount of fluid signal within the inferior aspect of left   sided perinephric fat. The appearance is unchanged since the study performed   2 days earlier. .      Fatty liver.                ED BEDSIDE ULTRASOUND:   Performed by ED Physician - none    LABS:  Labs Reviewed   CBC WITH AUTO DIFFERENTIAL - Abnormal; Notable for the following components:       Result Value    WBC 16.0 (*)     RBC 3.89 (*)     Hemoglobin 12.4 (*)     Hematocrit 38.7 (*)     RDW 15.0 (*)     Seg Neutrophils 78 (*)     Lymphocytes 15 (*)     Eosinophils % 0 (*)     Segs Absolute 12.48 (*)     All other components within normal limits   BASIC METABOLIC PANEL W/ REFLEX TO MG FOR LOW K - Abnormal; Notable for the following components:    Glucose 152 (*)     CREATININE 1.78 (*)     GFR Non- 38 (*)     GFR  46 (*)     All other components within normal limits   URINE RT REFLEX TO CULTURE - Abnormal; Notable for the following components: Ketones, Urine 2+ (*)     Specific Gravity, UA >1.030 (*)     All other components within normal limits   MICROSCOPIC URINALYSIS - Abnormal; Notable for the following components:    Mucus, UA TRACE (*)     All other components within normal limits   CBC - Abnormal; Notable for the following components:    WBC 12.8 (*)     RBC 3.50 (*)     Hemoglobin 11.2 (*)     Hematocrit 34.6 (*)     RDW 15.0 (*)     All other components within normal limits   COMPREHENSIVE METABOLIC PANEL W/ REFLEX TO MG FOR LOW K - Abnormal; Notable for the following components:    Glucose 118 (*)     CREATININE 1.64 (*)     Chloride 108 (*)     Total Protein 6.0 (*)     GFR Non- 42 (*)     GFR  50 (*)     All other components within normal limits   CULTURE, URINE   LIPASE       All other labs were within normal range or not returned as of this dictation. EMERGENCY DEPARTMENT COURSE and DIFFERENTIAL DIAGNOSIS/MDM:   Vitals:    Vitals:    05/16/21 0330 05/16/21 0425 05/16/21 0701 05/16/21 1345   BP: 128/75  (!) 144/84 136/75   Pulse: 72  80 65   Resp: 18  18 16   Temp: 97.7 °F (36.5 °C)  97.9 °F (36.6 °C) 97.6 °F (36.4 °C)   TempSrc: Temporal  Temporal Temporal   SpO2: 97%  96% 97%   Weight:  215 lb 11.2 oz (97.8 kg)     Height:             MDM  Number of Diagnoses or Management Options  Left flank pain  Leukocytosis, unspecified type  Obstruction of left ureteropelvic junction due to stone  Diagnosis management comments: 66-year-old gentleman presents emergency department with history for abrupt onset of left flank pain morning of ED arrival.  Patient did have a documented ureterolithiasis at the UPJ junction 2 days prior course the patient was hospitalized    Laboratory studies imaging studies have been requested    A page has been placed for urology on-call however no return call as it is the weekend.     Care the patient had been transferred to [facility] emergency department attending Dr. Leonel Fallon and disposition is pending              REASSESSMENT   After receiving IV analgesia the patient's discomfort is controlled-he remains hemodynamically stable and nontoxic-appearing during my attendance in the emergency department    ED Course as of May 17 0859   Sat May 15, 2021   1855 CBC Auto Differential(!):    WBC 16.0(!)   RBC 3.89(!)   Hemoglobin Quant 12.4(!)   Hematocrit 38.7(!)   MCV 99.5   MCH 31.9   MCHC 32.0   RDW 15.0(!)   Platelet Count 947   MPV 9.2   NRBC Automated 0.0   Differential Type NOT REPORTED   WBC Morphology NOT REPORTED   RBC Morphology NOT REPORTED   Platelet Estimate NOT REPORTED   Seg Neutrophils 78(!)   Lymphocytes 15(!)   Monocytes 6   Eosinophils % 0(!)   Immature Granulocytes 0   Basophils 1   Segs Absolute 12.48(!)   Absolute Lym. .. [RS]   1342 Basic Metabolic Panel w/ Reflex to MG(!):    Glucose 152(!)   BUN 18   Creatinine 1.78(!)   Bun/Cre Ratio 10   Calcium 9.2   Sodium 138   Potassium 4.6   Chloride 103   CO2 25   Anion Gap 10   GFR Non- 38(!)   GFR  46(!)   GFR Comment        GFR Staging      [RS]   1855 Lipase:    Lipase 22 [RS]   1856 CT abdomen pelvis without contrast demonstrates 4 mm stone left UP junction with hydronephrosis    [RS]   2007 Creatinine(!): 1.78 [MT]      ED Course User Index  [MT] Odalis Elliott MD  [RS] Ritchie Holstein, MD         CRITICAL CARE TIME   Total Critical Care time was minutes, excluding separately reportable procedures. There was a high probability of clinically significant/life threatening deterioration in the patient's condition which required my urgent intervention. CONSULTS:  IP CONSULT TO CASE MANAGEMENT    PROCEDURES:  Unless otherwise noted below, none     Procedures    FINAL IMPRESSION      1. Obstruction of left ureteropelvic junction due to stone    2. Leukocytosis, unspecified type    3.  Left flank pain          DISPOSITION/PLAN   DISPOSITION Admitted 05/15/2021 08:55:39 PM      PATIENT REFERRED TO:  Ezequiel Ayers MD  [address] [place] [address]  [phone]    Go in 1 day  follow up for hospital admission      DISCHARGE MEDICATIONS:  Discharge Medication List as of 5/16/2021  4:01 PM        Controlled Substances Monitoring:     No flowsheet data found.     (Please note that portions of this note were completed with a voice recognition program.  Efforts were made to edit the dictations but occasionally words are mis-transcribed.)    Pham Watkins MD (electronically signed)  Attending Emergency Physician            Pham Watkins MD  05/17/21 0900

## 2021-05-16 VITALS
RESPIRATION RATE: 16 BRPM | HEIGHT: 68 IN | OXYGEN SATURATION: 97 % | DIASTOLIC BLOOD PRESSURE: 75 MMHG | BODY MASS INDEX: 32.69 KG/M2 | HEART RATE: 65 BPM | TEMPERATURE: 97.6 F | WEIGHT: 215.7 LBS | SYSTOLIC BLOOD PRESSURE: 136 MMHG

## 2021-05-16 PROBLEM — I10 ESSENTIAL HYPERTENSION: Status: ACTIVE | Noted: 2021-02-19

## 2021-05-16 LAB
ALBUMIN SERPL-MCNC: 3.7 G/DL (ref 3.5–5.2)
ALBUMIN/GLOBULIN RATIO: 1.6 (ref 1–2.5)
ALP BLD-CCNC: 71 U/L (ref 40–129)
ALT SERPL-CCNC: 26 U/L (ref 5–41)
ANION GAP SERPL CALCULATED.3IONS-SCNC: 9 MMOL/L (ref 9–17)
AST SERPL-CCNC: 16 U/L
BILIRUB SERPL-MCNC: 0.75 MG/DL (ref 0.3–1.2)
BUN BLDV-MCNC: 14 MG/DL (ref 8–23)
BUN/CREAT BLD: 9 (ref 9–20)
CALCIUM SERPL-MCNC: 8.6 MG/DL (ref 8.6–10.4)
CHLORIDE BLD-SCNC: 108 MMOL/L (ref 98–107)
CO2: 24 MMOL/L (ref 20–31)
CREAT SERPL-MCNC: 1.64 MG/DL (ref 0.7–1.2)
GFR AFRICAN AMERICAN: 50 ML/MIN
GFR NON-AFRICAN AMERICAN: 42 ML/MIN
GFR SERPL CREATININE-BSD FRML MDRD: ABNORMAL ML/MIN/{1.73_M2}
GFR SERPL CREATININE-BSD FRML MDRD: ABNORMAL ML/MIN/{1.73_M2}
GLUCOSE BLD-MCNC: 118 MG/DL (ref 70–99)
HCT VFR BLD CALC: 34.6 % (ref 40.7–50.3)
HEMOGLOBIN: 11.2 G/DL (ref 13–17)
MCH RBC QN AUTO: 32 PG (ref 25.2–33.5)
MCHC RBC AUTO-ENTMCNC: 32.4 G/DL (ref 28.4–34.8)
MCV RBC AUTO: 98.9 FL (ref 82.6–102.9)
NRBC AUTOMATED: 0 PER 100 WBC
PDW BLD-RTO: 15 % (ref 11.8–14.4)
PLATELET # BLD: 228 K/UL (ref 138–453)
PMV BLD AUTO: 9.6 FL (ref 8.1–13.5)
POTASSIUM SERPL-SCNC: 4.7 MMOL/L (ref 3.7–5.3)
RBC # BLD: 3.5 M/UL (ref 4.21–5.77)
SODIUM BLD-SCNC: 141 MMOL/L (ref 135–144)
TOTAL PROTEIN: 6 G/DL (ref 6.4–8.3)
WBC # BLD: 12.8 K/UL (ref 3.5–11.3)

## 2021-05-16 PROCEDURE — 2580000003 HC RX 258: Performed by: INTERNAL MEDICINE

## 2021-05-16 PROCEDURE — 6370000000 HC RX 637 (ALT 250 FOR IP): Performed by: INTERNAL MEDICINE

## 2021-05-16 PROCEDURE — 36415 COLL VENOUS BLD VENIPUNCTURE: CPT

## 2021-05-16 PROCEDURE — 6360000002 HC RX W HCPCS: Performed by: INTERNAL MEDICINE

## 2021-05-16 PROCEDURE — 94761 N-INVAS EAR/PLS OXIMETRY MLT: CPT

## 2021-05-16 PROCEDURE — 80053 COMPREHEN METABOLIC PANEL: CPT

## 2021-05-16 PROCEDURE — 85027 COMPLETE CBC AUTOMATED: CPT

## 2021-05-16 RX ORDER — CIPROFLOXACIN 500 MG/1
500 TABLET, FILM COATED ORAL 2 TIMES DAILY
Qty: 14 TABLET | Refills: 0 | Status: SHIPPED | OUTPATIENT
Start: 2021-05-16 | End: 2021-05-26

## 2021-05-16 RX ADMIN — SODIUM CHLORIDE: 9 INJECTION, SOLUTION INTRAVENOUS at 15:26

## 2021-05-16 RX ADMIN — POLYETHYLENE GLYCOL 3350 17 G: 17 POWDER, FOR SOLUTION ORAL at 08:54

## 2021-05-16 RX ADMIN — ENOXAPARIN SODIUM 40 MG: 40 INJECTION SUBCUTANEOUS at 08:43

## 2021-05-16 RX ADMIN — SODIUM CHLORIDE: 9 INJECTION, SOLUTION INTRAVENOUS at 07:06

## 2021-05-16 ASSESSMENT — PAIN SCALES - GENERAL: PAINLEVEL_OUTOF10: 0

## 2021-05-16 NOTE — ED PROVIDER NOTES
[facility] ED  EMERGENCY DEPARTMENT ENCOUNTER      Pt Name: Rhoderick Mohs  MRN: [id number]  [place] 1950  Date of evaluation: 5/15/2021  Provider: Haydee Ruffin MD    CHIEF COMPLAINT       Chief Complaint   Patient presents with    Flank Pain     left flank pain, recently discharged for kidney stone         HISTORY OF PRESENT ILLNESS   (Location/Symptom, Timing/Onset, Context/Setting, Quality, Duration, Modifying Factors, Severity)  Note limiting factors. Rhoderick Mohs is a 70 y.o. male who presents to the emergency department      Signout received from Dr. Margaret Duran please refer to his chart for complete history review of systems and physical examination. 79-year-old man is only discharged from the hospital for hospital for flank pain, a 6 mm UPJ stone and leukocytosis without fevers. Patient returned today for left-sided flank pain. Patient denies any fevers or chills. Is currently taking Cipro. Took Norco earlier this morning. Nursing Notes were reviewed. REVIEW OF SYSTEMS    (2-9 systems for level 4, 10 or more for level 5)     Review of Systems   Reason unable to perform ROS: Per previous providers note. Except as noted above the remainder of the review of systems was reviewed and negative. PAST MEDICAL HISTORY     Past Medical History:   Diagnosis Date    Arthritis     Hyperlipidemia          SURGICAL HISTORY       Past Surgical History:   Procedure Laterality Date    JOINT REPLACEMENT Left 07/12/2018    knee    PA TOTAL KNEE ARTHROPLASTY Left 7/12/2018    KNEE TOTAL ARTHROPLASTY performed by Mathew Mari MD at [facility] [address] [address]       Previous Medications    CIPROFLOXACIN (CIPRO) 500 MG TABLET    Take 1 tablet by mouth 2 times daily for 7 days    HYDROCODONE-ACETAMINOPHEN (NORCO) 5-325 MG PER TABLET    Take 1 tablet by mouth every 6 hours as needed for Pain for up to 3 days. Intended supply: 3 days.  Take lowest dose possible to manage pain    ONDANSETRON (ZOFRAN ODT) 4 MG DISINTEGRATING TABLET    Take 1 tablet by mouth every 8 hours as needed for Nausea or Vomiting    TAMSULOSIN (FLOMAX) 0.4 MG CAPSULE    Take 1 capsule by mouth daily       ALLERGIES     Patient has no known allergies. FAMILY HISTORY     History reviewed. No pertinent family history. SOCIAL HISTORY       Social History     Socioeconomic History    Marital status:      Spouse name: None    Number of children: None    Years of education: None    Highest education level: None   Occupational History    None   Tobacco Use    Smoking status: Former Smoker     Types: Cigarettes     Quit date:      Years since quittin.3    Smokeless tobacco: Never Used   Substance and Sexual Activity    Alcohol use: No    Drug use: No    Sexual activity: None   Other Topics Concern    None   Social History Narrative    None     Social Determinants of Health     Financial Resource Strain:     Difficulty of Paying Living Expenses:    Food Insecurity:     Worried About Running Out of Food in the Last Year:     Ran Out of Food in the Last Year:    Transportation Needs:     Lack of Transportation (Medical):      Lack of Transportation (Non-Medical):    Physical Activity:     Days of Exercise per Week:     Minutes of Exercise per Session:    Stress:     Feeling of Stress :    Social Connections:     Frequency of Communication with Friends and Family:     Frequency of Social Gatherings with Friends and Family:     Attends Church Services:     Active Member of Clubs or Organizations:     Attends Club or Organization Meetings:     Marital Status:    Intimate Partner Violence:     Fear of Current or Ex-Partner:     Emotionally Abused:     Physically Abused:     Sexually Abused:        SCREENINGS        Maurice Coma Scale  Eye Opening: Spontaneous  Best Verbal Response: Oriented  Best Motor Response: Obeys commands  Maurice Coma Scale Score: 15 American 38 (*)     GFR  46 (*)     All other components within normal limits   URINE RT REFLEX TO CULTURE - Abnormal; Notable for the following components:    Ketones, Urine 2+ (*)     Specific Gravity, UA >1.030 (*)     All other components within normal limits   MICROSCOPIC URINALYSIS - Abnormal; Notable for the following components:    Mucus, UA TRACE (*)     All other components within normal limits   CULTURE, URINE   LIPASE       All other labs were within normal range or not returned as of this dictation. EMERGENCY DEPARTMENT COURSE and DIFFERENTIAL DIAGNOSIS/MDM:   Vitals:    Vitals:    05/15/21 1800 05/15/21 1815 05/15/21 1830 05/15/21 1845   BP: (!) 173/83 (!) 161/145 (!) 173/81 (!) 168/81   Pulse:       Resp:       Temp:       TempSrc:       SpO2: 92% 90% 95% 93%           MDM  Number of Diagnoses or Management Options  Diagnosis management comments: CT results from today reviewed. Patient now has a 4 mm obstructing proximal ureteral stone with hydronephrosis on the left. He does have a persistent perinephric stranding. White count today is 16,000. When he was admitted on Thursday his white count was 17,900. It had improved to 16,000 yesterday when he was discharged. He has been taking his Cipro at home as prescribed. Creatinine today is 1.78. Discharge it was 1.31. Due to new stone with obstruction persistent elevated leukocytosis and elevating creatinine as well as flank pain patient is to be admitted for continued management. Patient was discussed via telephone with Dr. Beth Burr who accepted him for admission.       [facility]       REASSESSMENT     ED Course as of May 15 2056   Sat May 15, 2021   1855 CBC Auto Differential(!):    WBC 16.0(!)   RBC 3.89(!)   Hemoglobin Quant 12.4(!)   Hematocrit 38.7(!)   MCV 99.5   MCH 31.9   MCHC 32.0   RDW 15.0(!)   Platelet Count 081   MPV 9.2   NRBC Automated 0.0   Differential Type NOT REPORTED   WBC Morphology NOT REPORTED   RBC Morphology NOT

## 2021-05-16 NOTE — PROGRESS NOTES
Pt arrived to floor from ED at 2150, admitted by Dr. Melany Bouregois as hospitalist for obstructive kidney stone. Pt ambulated to bed without difficulty after urinating in bathroom, 300 ml noted and strained. Pt oriented to room and call light. Nonskid socks placed on patient. VS obtained and assessment done as charted in flow sheet. Pt is A&Ox4, answers questions appropriately. Pt states pain is improved to about a \"1\" on a 0-10 pain scale to left flank. Navigator completed. No PHI restrictions per patient. IVF started. Pt provided with water pitcher and turkey sandwich meal pack. Pt denies any other current needs at this time. Call light and bedside table within reach. Pt aware to use call light as needed for assistance when needing to get up. Will continue to monitor.

## 2021-05-16 NOTE — PLAN OF CARE
Problem: Pain:  Goal: Pain level will decrease  Description: Pain level will decrease  5/16/2021 1601 by Eddie Malhotra RN  Outcome: Completed  5/16/2021 0932 by Dillon Nur RN  Outcome: Ongoing  Goal: Control of acute pain  Description: Control of acute pain  5/16/2021 1601 by Eddie Malhotra RN  Outcome: Completed  5/16/2021 0932 by Dillon Nur RN  Outcome: Ongoing  Goal: Control of chronic pain  Description: Control of chronic pain  5/16/2021 1601 by Eddie Malhotra RN  Outcome: Completed  5/16/2021 0932 by Dillon Nur RN  Outcome: Ongoing     Problem: Coping:  Goal: Expressions of feelings of enhanced comfort will increase  Description: Expressions of feelings of enhanced comfort will increase  5/16/2021 1601 by Eddie Malhotra RN  Outcome: Completed  5/16/2021 0932 by Dillon Nur RN  Outcome: Ongoing     Problem: Urinary Elimination:  Goal: Ability to achieve a balanced intake and output will improve  Description: Ability to achieve a balanced intake and output will improve  5/16/2021 1601 by Eddie Malhotra RN  Outcome: Completed  5/16/2021 0932 by Dillon Nur RN  Outcome: Ongoing  Goal: Ability to recognize the need to void and respond appropriately will improve  Description: Ability to recognize the need to void and respond appropriately will improve  5/16/2021 1601 by Eddie Malhotra RN  Outcome: Completed  5/16/2021 0932 by Dillon Nur RN  Outcome: Ongoing  Goal: Will remain free from infection  Description: Will remain free from infection  5/16/2021 1601 by Eddie Malhotra RN  Outcome: Completed  5/16/2021 0932 by Dillon Nur RN  Outcome: Ongoing

## 2021-05-16 NOTE — PLAN OF CARE
Problem: Pain:  Goal: Pain level will decrease  Outcome: Ongoing  Goal: Control of acute pain  5/16/2021 0932 by Lou Kauffman RN  Outcome: Ongoing  5/16/2021 0027 by Katie Magana RN  Outcome: Ongoing  Goal: Control of chronic pain  Outcome: Ongoing     Problem: Coping:  Goal: Expressions of feelings of enhanced comfort will increase  Outcome: Ongoing     Problem: Urinary Elimination:  Goal: Ability to achieve a balanced intake and output will improve  5/16/2021 0932 by Lou Kauffman RN  Outcome: Ongoing  5/16/2021 0027 by Katie Magana RN  Outcome: Ongoing  Goal: Ability to recognize the need to void and respond appropriately will improve  5/16/2021 0932 by Lou Kauffman RN  Outcome: Ongoing  5/16/2021 0027 by Katie Magana RN  Outcome: Ongoing  Goal: Will remain free from infection  5/16/2021 0932 by Lou Kauffman RN  Outcome: Ongoing  5/16/2021 0027 by Katie Magana RN  Outcome: Ongoing

## 2021-05-16 NOTE — PLAN OF CARE
Problem: Pain:  Goal: Control of acute pain  Description: Control of acute pain  Outcome: Ongoing  Note: Pain assessments provided with interventions as appropriate. Pt had improved pain upon admission and is aware of available medications if pain worsens. Problem: Urinary Elimination:  Goal: Ability to achieve a balanced intake and output will improve  Description: Ability to achieve a balanced intake and output will improve  Outcome: Ongoing  Note: I&O monitored. IVF infusing. Urine strained. Problem: Urinary Elimination:  Goal: Ability to recognize the need to void and respond appropriately will improve  Description: Ability to recognize the need to void and respond appropriately will improve  Outcome: Ongoing  Note: Pt is able to void without complication. Problem: Urinary Elimination:  Goal: Will remain free from infection  Description: Will remain free from infection  Outcome: Ongoing  Note: Labs and VS monitored.

## 2021-05-16 NOTE — PROGRESS NOTES
Pt is resting in bed with eyes closed at this time. Call light and bedside table remain within reach. Will continue to monitor.

## 2021-05-16 NOTE — H&P
Patient:  Rhoderick Mohs  MRN: [id number]    Chief Complaint:    Chief Complaint   Patient presents with    Flank Pain     left flank pain, recently discharged for kidney stone       History Obtained From:  patient, electronic medical record    PCP: Tadeo Figueredo MD    History of Present Illness: The patient is a 70 y.o. male who presents with presents with abdominal pain. Hospitalized several days ago for nephrolithiasis. Had been complaining of nausea without vomiting. All of that is better. He feels much improved. CT scan showed a 6 mm UPJ calculus with a white blood cell count of 17,000. He had improvement in symptoms. Patient was discharged home and unfortunately returned last evening to the emergency room. The distal calculus was gone but there was a proximal calculus noted on CT scan. Patient was given pain medicine and admitted for pain control. Urinalysis looks normal.  White blood cell count down to 12,000. Past Medical History:        Diagnosis Date    Arthritis     Hyperlipidemia        Past Surgical History:        Procedure Laterality Date    JOINT REPLACEMENT Left 07/12/2018    knee    [place] TOTAL KNEE ARTHROPLASTY Left 7/12/2018    KNEE TOTAL ARTHROPLASTY performed by Mathew Mari MD at [place] [address] History:   History reviewed. No pertinent family history. Social History:   TOBACCO:   reports that he quit smoking about 21 years ago. His smoking use included cigarettes. He has never used smokeless tobacco.  ETOH:   reports no history of alcohol use. Allergies:  Patient has no known allergies. Medications Prior to Admission:    Prior to Admission medications    Medication Sig Start Date End Date Taking?  Authorizing Provider   ciprofloxacin (CIPRO) 500 MG tablet Take 1 tablet by mouth 2 times daily for 7 days 5/14/21 5/21/21 Yes SHEYLA Alicia - CHARLES   HYDROcodone-acetaminophen (NORCO) 5-325 MG per tablet Take 1 tablet by mouth every 6 hours as needed for Pain for up to 3 days. Intended supply: 3 days. Take lowest dose possible to manage pain 5/14/21 5/17/21 Yes SHEYLA Yates CNP   tamsulosin [facility]) 0.4 MG capsule Take 1 capsule by mouth daily 5/14/21  Yes SHEYLA Yates CNP   ondansetron (ZOFRAN ODT) 4 MG disintegrating tablet Take 1 tablet by mouth every 8 hours as needed for Nausea or Vomiting 5/14/21 5/26/21  SHEYLA Yates CNP       Review of Systems:  Constitutional:negative  for fevers, and negative for chills. Eyes: negative for visual disturbance   ENT: negative for sore throat, negative nasal congestion, and negative for earache  Respiratory: negative for shortness of breath, negative for cough, and negative for wheezing  Cardiovascular: negative for chest pain, negative for palpitations, and negative for syncope  Gastrointestinal: positive for abdominal pain, negative for nausea,negative for vomiting, negative for diarrhea, negative for constipation, and negative for hematochezia or melena  Genitourinary: negative for dysuria, negative for urinary urgency, negative for urinary frequency, and negative for hematuria  Skin: negative for skin rash, and negative for skin lesions  Neurological: negative for unilateral weakness, numbness or tingling. Physical Exam:    Vitals:   Temp: 97.9 °F (36.6 °C)  BP: (!) 144/84  Resp: 18  Pulse: 80  SpO2: 96 %  24HR INTAKE/OUTPUT:      Intake/Output Summary (Last 24 hours) at 5/16/2021 0715  Last data filed at 5/16/2021 0425  Gross per 24 hour   Intake 972.31 ml   Output 550 ml   Net 422.31 ml       Exam:  GEN:  alert and oriented to person, place and time  EYES: No gross abnormalities. and Sclera nonicteric  NECK: supple, no lymphadenopathy,  no carotid bruits  PULM: clear to auscultation bilaterally- no wheezes, rales or rhonchi, normal air movement, no respiratory distress  COR: regular rate & rhythm  ABD:  non-distended and no CVA tenderness.   No deep abdominal tenderness. Fairly normal exam  EXT:   no cyanosis, clubbing or edema present    NEURO: follows commands, ESTRADA, no deficits  SKIN:  no rashes or significant lesions  -----------------------------------------------------------------  Diagnostic Data:   Lab Results   Component Value Date    WBC 12.8 (H) 05/16/2021    HGB 11.2 (L) 05/16/2021     05/16/2021       Lab Results   Component Value Date    BUN 14 05/16/2021    CREATININE 1.64 (H) 05/16/2021     05/16/2021    K 4.7 05/16/2021    CALCIUM 8.6 05/16/2021     (H) 05/16/2021    CO2 24 05/16/2021    LABGLOM 42 (L) 05/16/2021       Lab Results   Component Value Date    WBCUA 0 TO 2 05/15/2021    RBCUA 0 TO 2 05/15/2021    EPITHUA 0 TO 2 05/15/2021    LEUKOCYTESUR NEGATIVE 05/15/2021    SPECGRAV >1.030 (H) 05/15/2021    GLUCOSEU NEGATIVE 05/15/2021    KETUA 2+ (A) 05/15/2021    PROTEINU NEGATIVE 05/15/2021    HGBUR NEGATIVE 05/15/2021    CASTUA NOT REPORTED 05/15/2021    CRYSTUA NOT REPORTED 05/15/2021    BACTERIA NOT REPORTED 05/15/2021    YEAST NOT REPORTED 05/15/2021       Lab Results   Component Value Date    TROPONINT NOT REPORTED 05/13/2021    PROBNP 147 05/13/2021       CT ABDOMEN PELVIS WO CONTRAST Additional Contrast? None    Result Date: 5/15/2021  EXAMINATION: STONE PROTOCOL CT OF THE ABDOMEN AND PELVIS 5/15/2021 5:40 pm TECHNIQUE: CT of the abdomen and pelvis was performed without the administration of intravenous contrast. Multiplanar reformatted images are provided for review. Dose modulation, iterative reconstruction, and/or weight based adjustment of the mA/kV was utilized to reduce the radiation dose to as low as reasonably achievable. COMPARISON: CT of 05/13/2021.  HISTORY: ORDERING SYSTEM PROVIDED HISTORY: Left flank pain suspect nephrolithiasis TECHNOLOGIST PROVIDED HISTORY: Left flank pain suspect nephrolithiasis Decision Support Exception - unselect if not a suspected or confirmed emergency medical condition->Emergency Medical Condition (MA) FINDINGS: LOWER CHEST:  Visualized portion of the lower chest demonstrates no acute abnormality. KIDNEYS AND URINARY TRACT: 4 mm obstructing stone of the left proximal ureter is associated with moderate left-sided hydroureteronephrosis and moderate left-sided perinephric stranding/minimal amount of fluid signal within the inferior aspect of left sided perinephric fat. .. Small cyst of the upper pole of right kidney. Right kidney and right collecting system are otherwise unremarkable. ORGANS: Fatty liver. Visualized portions of the unenhanced liver, spleen, pancreas, gallbladder, and adrenal glands demonstrate no acute abnormality. GI/BOWEL: No bowel obstruction. No evidence of acute appendicitis. PELVIS: The bladder and pelvic organs are unremarkable. PERITONEUM/RETROPERITONEUM: No free air or free fluid is noted. No pathologically enlarged lymphadenopathy. The vasculature do not demonstrate acute abnormality. BONES/SOFT TISSUES: The osseous structures demonstrate no acute abnormality. At L5-S1, severe disc and facet degenerative disease, bilateral pars defects and severe bilateral neural foraminal narrowing     4 mm obstructing stone of the left proximal ureter is associated with moderate left-sided hydroureteronephrosis and moderate left-sided perinephric stranding/minimal amount of fluid signal within the inferior aspect of left sided perinephric fat. The appearance is unchanged since the study performed 2 days earlier. . Fatty liver. Assessment:    Principal Problem:    Urinary tract obstruction due to kidney stone  Resolved Problems:    * No resolved hospital problems.  *      Patient Active Problem List    Diagnosis Date Noted    Urinary tract obstruction due to kidney stone 05/15/2021    Hydronephrosis with urinary obstruction due to ureteral calculus 05/13/2021    Essential hypertension 02/19/2021    Primary osteoarthritis of left knee 07/12/2018       Plan:     · This patient requires inpatient admission because of nephrolithiasis with obstruction different stone than day before? · Factors affecting the medical complexity of this patient include Principal Problem:  ·   Urinary tract obstruction due to kidney stone  · Resolved Problems:  ·   * No resolved hospital problems. *  ·   · Estimated length of stay is 1 or 2 days  · Pain control, fluids, Flomax. Patient already has urology appointment for tomorrow. May discharge later today depending on how things go. · High risk medication monitorin    CORE MEASURES  Core measures including DVT prophylaxis, Code Status, Nutrition, Therapy Options, chart reviewed and advance directives reviewed at this visit.     Caryl Pettit MD, MD  2021, 7:15 AM

## 2021-05-16 NOTE — FLOWSHEET NOTE
Awake, resting in bed. Vitals checked and assessment completed. Denies pain or nausea this morning. No dysuria. Encouraged increase in po fluids today and out of bed to chair at bedside. Voices understanding. Call light within reach.  Continue to monitor

## 2021-05-17 ENCOUNTER — HOSPITAL ENCOUNTER (OUTPATIENT)
Age: 71
Setting detail: OBSERVATION
LOS: 1 days | Discharge: HOME | End: 2021-05-18
Attending: EMERGENCY MEDICINE | Admitting: FAMILY MEDICINE
Payer: MEDICARE

## 2021-05-17 ENCOUNTER — OFFICE VISIT (OUTPATIENT)
Dept: UROLOGY | Age: 71
End: 2021-05-17
Payer: MEDICARE

## 2021-05-17 VITALS
SYSTOLIC BLOOD PRESSURE: 132 MMHG | DIASTOLIC BLOOD PRESSURE: 82 MMHG | WEIGHT: 216 LBS | BODY MASS INDEX: 32.84 KG/M2 | TEMPERATURE: 98.7 F

## 2021-05-17 DIAGNOSIS — N20.1 LEFT URETERAL STONE: ICD-10-CM

## 2021-05-17 DIAGNOSIS — R10.9 FLANK PAIN: Primary | ICD-10-CM

## 2021-05-17 DIAGNOSIS — N13.30 HYDRONEPHROSIS, UNSPECIFIED HYDRONEPHROSIS TYPE: ICD-10-CM

## 2021-05-17 DIAGNOSIS — N20.1 URETERAL CALCULUS: Primary | ICD-10-CM

## 2021-05-17 DIAGNOSIS — N20.1 URETEROLITHIASIS: ICD-10-CM

## 2021-05-17 LAB
-: ABNORMAL
ABSOLUTE EOS #: 0.11 K/UL (ref 0–0.44)
ABSOLUTE IMMATURE GRANULOCYTE: 0.04 K/UL (ref 0–0.3)
ABSOLUTE LYMPH #: 1.68 K/UL (ref 1.1–3.7)
ABSOLUTE MONO #: 1.31 K/UL (ref 0.1–1.2)
ALBUMIN SERPL-MCNC: 4.2 G/DL (ref 3.5–5.2)
ALBUMIN/GLOBULIN RATIO: 1.8 (ref 1–2.5)
ALP BLD-CCNC: 85 U/L (ref 40–129)
ALT SERPL-CCNC: 35 U/L (ref 5–41)
AMORPHOUS: ABNORMAL
ANION GAP SERPL CALCULATED.3IONS-SCNC: 10 MMOL/L (ref 9–17)
AST SERPL-CCNC: 25 U/L
BACTERIA: ABNORMAL
BASOPHILS # BLD: 0 % (ref 0–2)
BASOPHILS ABSOLUTE: 0.05 K/UL (ref 0–0.2)
BILIRUB SERPL-MCNC: 0.66 MG/DL (ref 0.3–1.2)
BILIRUBIN URINE: NEGATIVE
BUN BLDV-MCNC: 17 MG/DL (ref 8–23)
BUN/CREAT BLD: 10 (ref 9–20)
CALCIUM SERPL-MCNC: 9.3 MG/DL (ref 8.6–10.4)
CASTS UA: ABNORMAL /LPF
CHLORIDE BLD-SCNC: 101 MMOL/L (ref 98–107)
CO2: 26 MMOL/L (ref 20–31)
COLOR: YELLOW
COMMENT UA: ABNORMAL
CREAT SERPL-MCNC: 1.69 MG/DL (ref 0.7–1.2)
CRYSTALS, UA: ABNORMAL /HPF
CULTURE: NO GROWTH
DIFFERENTIAL TYPE: ABNORMAL
EOSINOPHILS RELATIVE PERCENT: 1 % (ref 1–4)
EPITHELIAL CELLS UA: ABNORMAL /HPF (ref 0–5)
GFR AFRICAN AMERICAN: 49 ML/MIN
GFR NON-AFRICAN AMERICAN: 40 ML/MIN
GFR SERPL CREATININE-BSD FRML MDRD: ABNORMAL ML/MIN/{1.73_M2}
GFR SERPL CREATININE-BSD FRML MDRD: ABNORMAL ML/MIN/{1.73_M2}
GLUCOSE BLD-MCNC: 138 MG/DL (ref 70–99)
GLUCOSE URINE: NEGATIVE
HCT VFR BLD CALC: 36.4 % (ref 40.7–50.3)
HEMOGLOBIN: 11.9 G/DL (ref 13–17)
IMMATURE GRANULOCYTES: 0 %
KETONES, URINE: ABNORMAL
LEUKOCYTE ESTERASE, URINE: NEGATIVE
LYMPHOCYTES # BLD: 14 % (ref 24–43)
Lab: NORMAL
MCH RBC QN AUTO: 32.2 PG (ref 25.2–33.5)
MCHC RBC AUTO-ENTMCNC: 32.7 G/DL (ref 28.4–34.8)
MCV RBC AUTO: 98.4 FL (ref 82.6–102.9)
MONOCYTES # BLD: 11 % (ref 3–12)
MUCUS: ABNORMAL
NITRITE, URINE: NEGATIVE
NRBC AUTOMATED: 0.2 PER 100 WBC
OTHER OBSERVATIONS UA: ABNORMAL
PDW BLD-RTO: 14.6 % (ref 11.8–14.4)
PH UA: 6 (ref 5–9)
PLATELET # BLD: 291 K/UL (ref 138–453)
PLATELET ESTIMATE: ABNORMAL
PMV BLD AUTO: 9.9 FL (ref 8.1–13.5)
POTASSIUM SERPL-SCNC: 4.1 MMOL/L (ref 3.7–5.3)
PROTEIN UA: NEGATIVE
RBC # BLD: 3.7 M/UL (ref 4.21–5.77)
RBC # BLD: ABNORMAL 10*6/UL
RBC UA: ABNORMAL /HPF (ref 0–2)
RENAL EPITHELIAL, UA: ABNORMAL /HPF
SEG NEUTROPHILS: 74 % (ref 36–65)
SEGMENTED NEUTROPHILS ABSOLUTE COUNT: 9.3 K/UL (ref 1.5–8.1)
SODIUM BLD-SCNC: 137 MMOL/L (ref 135–144)
SPECIFIC GRAVITY UA: >1.03 (ref 1.01–1.02)
SPECIMEN DESCRIPTION: NORMAL
TOTAL PROTEIN: 6.5 G/DL (ref 6.4–8.3)
TRICHOMONAS: ABNORMAL
TURBIDITY: CLEAR
URINE HGB: NEGATIVE
UROBILINOGEN, URINE: NORMAL
WBC # BLD: 12.5 K/UL (ref 3.5–11.3)
WBC # BLD: ABNORMAL 10*3/UL
WBC UA: ABNORMAL /HPF (ref 0–5)
YEAST: ABNORMAL

## 2021-05-17 PROCEDURE — 1123F ACP DISCUSS/DSCN MKR DOCD: CPT | Performed by: UROLOGY

## 2021-05-17 PROCEDURE — 85025 COMPLETE CBC W/AUTO DIFF WBC: CPT

## 2021-05-17 PROCEDURE — 80053 COMPREHEN METABOLIC PANEL: CPT

## 2021-05-17 PROCEDURE — 96361 HYDRATE IV INFUSION ADD-ON: CPT

## 2021-05-17 PROCEDURE — 2580000003 HC RX 258: Performed by: EMERGENCY MEDICINE

## 2021-05-17 PROCEDURE — 3017F COLORECTAL CA SCREEN DOC REV: CPT | Performed by: UROLOGY

## 2021-05-17 PROCEDURE — G0378 HOSPITAL OBSERVATION PER HR: HCPCS

## 2021-05-17 PROCEDURE — 4040F PNEUMOC VAC/ADMIN/RCVD: CPT | Performed by: UROLOGY

## 2021-05-17 PROCEDURE — 6360000002 HC RX W HCPCS: Performed by: EMERGENCY MEDICINE

## 2021-05-17 PROCEDURE — 99284 EMERGENCY DEPT VISIT MOD MDM: CPT

## 2021-05-17 PROCEDURE — 81001 URINALYSIS AUTO W/SCOPE: CPT

## 2021-05-17 PROCEDURE — 1111F DSCHRG MED/CURRENT MED MERGE: CPT | Performed by: UROLOGY

## 2021-05-17 PROCEDURE — 36415 COLL VENOUS BLD VENIPUNCTURE: CPT

## 2021-05-17 PROCEDURE — 96375 TX/PRO/DX INJ NEW DRUG ADDON: CPT

## 2021-05-17 PROCEDURE — G8427 DOCREV CUR MEDS BY ELIG CLIN: HCPCS | Performed by: UROLOGY

## 2021-05-17 PROCEDURE — G8417 CALC BMI ABV UP PARAM F/U: HCPCS | Performed by: UROLOGY

## 2021-05-17 PROCEDURE — 2580000003 HC RX 258: Performed by: FAMILY MEDICINE

## 2021-05-17 PROCEDURE — 1036F TOBACCO NON-USER: CPT | Performed by: UROLOGY

## 2021-05-17 PROCEDURE — 99214 OFFICE O/P EST MOD 30 MIN: CPT | Performed by: UROLOGY

## 2021-05-17 PROCEDURE — 96374 THER/PROPH/DIAG INJ IV PUSH: CPT

## 2021-05-17 RX ORDER — 0.9 % SODIUM CHLORIDE 0.9 %
1000 INTRAVENOUS SOLUTION INTRAVENOUS ONCE
Status: COMPLETED | OUTPATIENT
Start: 2021-05-17 | End: 2021-05-17

## 2021-05-17 RX ORDER — SODIUM CHLORIDE, SODIUM LACTATE, POTASSIUM CHLORIDE, CALCIUM CHLORIDE 600; 310; 30; 20 MG/100ML; MG/100ML; MG/100ML; MG/100ML
INJECTION, SOLUTION INTRAVENOUS CONTINUOUS
Status: DISCONTINUED | OUTPATIENT
Start: 2021-05-17 | End: 2021-05-18

## 2021-05-17 RX ORDER — ONDANSETRON 4 MG/1
4 TABLET, ORALLY DISINTEGRATING ORAL EVERY 8 HOURS PRN
Status: DISCONTINUED | OUTPATIENT
Start: 2021-05-17 | End: 2021-05-18 | Stop reason: HOSPADM

## 2021-05-17 RX ORDER — KETOROLAC TROMETHAMINE 15 MG/ML
30 INJECTION, SOLUTION INTRAMUSCULAR; INTRAVENOUS ONCE
Status: COMPLETED | OUTPATIENT
Start: 2021-05-17 | End: 2021-05-17

## 2021-05-17 RX ORDER — POTASSIUM CHLORIDE 20 MEQ/1
40 TABLET, EXTENDED RELEASE ORAL PRN
Status: DISCONTINUED | OUTPATIENT
Start: 2021-05-17 | End: 2021-05-18 | Stop reason: HOSPADM

## 2021-05-17 RX ORDER — MORPHINE SULFATE 2 MG/ML
2 INJECTION, SOLUTION INTRAMUSCULAR; INTRAVENOUS ONCE
Status: COMPLETED | OUTPATIENT
Start: 2021-05-17 | End: 2021-05-17

## 2021-05-17 RX ORDER — ONDANSETRON 2 MG/ML
4 INJECTION INTRAMUSCULAR; INTRAVENOUS EVERY 6 HOURS PRN
Status: DISCONTINUED | OUTPATIENT
Start: 2021-05-17 | End: 2021-05-18 | Stop reason: HOSPADM

## 2021-05-17 RX ORDER — MORPHINE SULFATE 2 MG/ML
2 INJECTION, SOLUTION INTRAMUSCULAR; INTRAVENOUS
Status: DISCONTINUED | OUTPATIENT
Start: 2021-05-17 | End: 2021-05-18

## 2021-05-17 RX ORDER — ONDANSETRON 2 MG/ML
4 INJECTION INTRAMUSCULAR; INTRAVENOUS ONCE
Status: COMPLETED | OUTPATIENT
Start: 2021-05-17 | End: 2021-05-17

## 2021-05-17 RX ORDER — SODIUM CHLORIDE 0.9 % (FLUSH) 0.9 %
5-40 SYRINGE (ML) INJECTION EVERY 12 HOURS SCHEDULED
Status: DISCONTINUED | OUTPATIENT
Start: 2021-05-17 | End: 2021-05-18

## 2021-05-17 RX ORDER — POTASSIUM CHLORIDE 7.45 MG/ML
10 INJECTION INTRAVENOUS PRN
Status: DISCONTINUED | OUTPATIENT
Start: 2021-05-17 | End: 2021-05-18 | Stop reason: HOSPADM

## 2021-05-17 RX ORDER — SODIUM CHLORIDE 0.9 % (FLUSH) 0.9 %
5-40 SYRINGE (ML) INJECTION PRN
Status: DISCONTINUED | OUTPATIENT
Start: 2021-05-17 | End: 2021-05-18

## 2021-05-17 RX ORDER — SODIUM CHLORIDE 9 MG/ML
25 INJECTION, SOLUTION INTRAVENOUS PRN
Status: DISCONTINUED | OUTPATIENT
Start: 2021-05-17 | End: 2021-05-18

## 2021-05-17 RX ADMIN — SODIUM CHLORIDE, POTASSIUM CHLORIDE, SODIUM LACTATE AND CALCIUM CHLORIDE: 600; 310; 30; 20 INJECTION, SOLUTION INTRAVENOUS at 21:35

## 2021-05-17 RX ADMIN — SODIUM CHLORIDE, PRESERVATIVE FREE 10 ML: 5 INJECTION INTRAVENOUS at 22:00

## 2021-05-17 RX ADMIN — ONDANSETRON 4 MG: 2 INJECTION INTRAMUSCULAR; INTRAVENOUS at 19:06

## 2021-05-17 RX ADMIN — SODIUM CHLORIDE 1000 ML: 9 INJECTION, SOLUTION INTRAVENOUS at 19:08

## 2021-05-17 RX ADMIN — MORPHINE SULFATE 2 MG: 2 INJECTION, SOLUTION INTRAMUSCULAR; INTRAVENOUS at 19:07

## 2021-05-17 RX ADMIN — KETOROLAC TROMETHAMINE 30 MG: 15 INJECTION, SOLUTION INTRAMUSCULAR; INTRAVENOUS at 19:06

## 2021-05-17 ASSESSMENT — ENCOUNTER SYMPTOMS
EYE REDNESS: 0
COUGH: 0
BACK PAIN: 0
SHORTNESS OF BREATH: 0
WHEEZING: 0
VOMITING: 0
EYE PAIN: 0
COLOR CHANGE: 0
ABDOMINAL PAIN: 0
NAUSEA: 0

## 2021-05-17 ASSESSMENT — PAIN SCALES - GENERAL
PAINLEVEL_OUTOF10: 0
PAINLEVEL_OUTOF10: 8

## 2021-05-17 ASSESSMENT — PAIN DESCRIPTION - ORIENTATION: ORIENTATION: LEFT

## 2021-05-17 NOTE — PATIENT INSTRUCTIONS
Schedule a Vaccine  When you qualify to receive the vaccine per the [address] guidelines, call the [facility]) COVID-19 Vaccination Hotline to schedule your appointment or to get additional information about the [facility]) locations which are offering the COVID-19 vaccine. To be most effective, it's important that you receive two doses of one of the COVID-19 vaccines. -If you are receiving the [place] vaccine, your second shot will be scheduled as close to 21 days after the first shot as possible. -If you are receiving the Moderna vaccine, your second shot will be scheduled as close to 28 days after the first shot as possible. [address] Vaccination Hotline: [phone]    In partnership with [facility] and [facility] Departments, patients can call [phone], Monday-Friday 8:00am-4:00pm for scheduling at our Hospitals. Or visit the [facility] websites for additional information of vaccine administration locations. Links to [facility]) website and Health Department websites:    [facility]-monitoring-coronavirus-covid-19/covid-19-vaccine/[place]-vaccine    [facility].tn    [email or web address]    SURVEY:    You may be receiving a survey from [employer] regarding your visit today. Please complete the survey to enable us to provide the highest quality of care to you and your family. If you cannot score us a very good on any question, please call the office to discuss how we could have made your experience a very good one. Thank you.     Your MA today: Gila Hart

## 2021-05-17 NOTE — ED PROVIDER NOTES
CYSTOSCOPY Left 2021    CYSTOSCOPY URETEROSCOPY performed by Natalie Alba MD at [address] Left 2018    knee    [place] TOTAL KNEE ARTHROPLASTY Left 2018    KNEE TOTAL ARTHROPLASTY performed by Leopoldo Cleaves, MD at [facility]       Discharge Medication List as of 2021  4:06 PM      CONTINUE these medications which have NOT CHANGED    Details   ciprofloxacin (CIPRO) 500 MG tablet Take 1 tablet by mouth 2 times daily for 10 days, Disp-14 tablet, R-0Normal      ondansetron (ZOFRAN ODT) 4 MG disintegrating tablet Take 1 tablet by mouth every 8 hours as needed for Nausea or Vomiting, Disp-9 tablet, R-3Normal      tamsulosin (FLOMAX) 0.4 MG capsule Take 1 capsule by mouth daily, Disp-30 capsule, R-0Normal             ALLERGIES     Patient has no known allergies. FAMILY HISTORY     No family history on file. SOCIAL HISTORY       Social History     Socioeconomic History    Marital status:      Spouse name: Not on file    Number of children: Not on file    Years of education: Not on file    Highest education level: Not on file   Occupational History    Not on file   Tobacco Use    Smoking status: Former Smoker     Types: Cigarettes     Quit date: 2000     Years since quittin.4    Smokeless tobacco: Never Used   Substance and Sexual Activity    Alcohol use: No    Drug use: No    Sexual activity: Not on file   Other Topics Concern    Not on file   Social History Narrative    Not on file     Social Determinants of Health     Financial Resource Strain:     Difficulty of Paying Living Expenses:    Food Insecurity:     Worried About [address] in the Last Year:     [address] in the Last Year:    Transportation Needs:     Lack of Transportation (Medical):      Lack of Transportation (Non-Medical):    Physical Activity:     Days of Exercise per Week:     Minutes of Exercise per Session:    Stress:     Feeling of Stress :    Social Connections:     Frequency of Communication with Friends and Family:     Frequency of Social Gatherings with Friends and Family:     Attends Taoism Services:     Active Member of Clubs or Organizations:     Attends Club or Organization Meetings:     Marital Status:    Intimate Partner Violence:     Fear of Current or Ex-Partner:     Emotionally Abused:     Physically Abused:     Sexually Abused:        SCREENINGS        Maurice Coma Scale  Eye Opening: Spontaneous  Best Verbal Response: Oriented  Best Motor Response: Obeys commands  Maurice Coma Scale Score: 15               PHYSICAL EXAM    (up to 7 for level 4, 8 or more for level 5)     ED Triage Vitals [05/17/21 1834]   BP Temp Temp Source Pulse Resp SpO2 Height Weight   (!) 166/104 98.7 °F (37.1 °C) Tympanic 86 22 97 % -- --       Physical Exam  Vitals and nursing note reviewed. Constitutional:       Comments: Alert, afebrile, patient appears in moderate pain distress   HENT:      Head: Normocephalic and atraumatic. Cardiovascular:      Rate and Rhythm: Normal rate and regular rhythm. Pulmonary:      Effort: Pulmonary effort is normal.      Breath sounds: Normal breath sounds. Musculoskeletal:         General: Normal range of motion. Cervical back: Normal range of motion and neck supple. Neurological:      General: No focal deficit present. Mental Status: He is oriented to person, place, and time.          DIAGNOSTIC RESULTS     EKG: All EKG's are interpreted by the Emergency Department Physician who either signs or Co-signs this chart in the absence of a cardiologist.        RADIOLOGY:   Non-plain film images such as CT, Ultrasound and MRI are read by the radiologist. Plain radiographic images are visualized and preliminarily interpreted by the emergency physician with the below findings:        Interpretation per the Radiologist below, if available at the time of this note:    [address] Final Result            ED BEDSIDE ULTRASOUND:   Performed by ED Physician - none    LABS:  Labs Reviewed   CBC WITH AUTO DIFFERENTIAL - Abnormal; Notable for the following components:       Result Value    WBC 12.5 (*)     RBC 3.70 (*)     Hemoglobin 11.9 (*)     Hematocrit 36.4 (*)     RDW 14.6 (*)     NRBC Automated 0.2 (*)     Seg Neutrophils 74 (*)     Lymphocytes 14 (*)     Segs Absolute 9.30 (*)     Absolute Mono # 1.31 (*)     All other components within normal limits   COMPREHENSIVE METABOLIC PANEL W/ REFLEX TO MG FOR LOW K - Abnormal; Notable for the following components:    Glucose 138 (*)     CREATININE 1.69 (*)     GFR Non- 40 (*)     GFR  49 (*)     All other components within normal limits   URINE RT REFLEX TO CULTURE - Abnormal; Notable for the following components:    Ketones, Urine 1+ (*)     Specific Gravity, UA >1.030 (*)     All other components within normal limits   MICROSCOPIC URINALYSIS - Abnormal; Notable for the following components:    Bacteria, UA TRACE (*)     Mucus, UA 1+ (*)     All other components within normal limits   CBC WITH AUTO DIFFERENTIAL - Abnormal; Notable for the following components:    WBC 12.7 (*)     RBC 3.32 (*)     Hemoglobin 10.7 (*)     Hematocrit 32.7 (*)     RDW 14.6 (*)     Seg Neutrophils 71 (*)     Lymphocytes 16 (*)     Segs Absolute 8.90 (*)     Absolute Mono # 1.45 (*)     All other components within normal limits       All other labs were within normal range or not returned as of this dictation.     EMERGENCY DEPARTMENT COURSE and DIFFERENTIAL DIAGNOSIS/MDM:   Vitals:    Vitals:    05/18/21 1345 05/18/21 1400 05/18/21 1420 05/18/21 1550   BP: (!) 172/69 (!) 156/63 (!) 159/91    Pulse: 66 66 69    Resp: 16 16 16    Temp:   97.3 °F (36.3 °C)    TempSrc:   Temporal    SpO2: 96% 95% 98% 98%   Weight:       Height:               MDM  Number of Diagnoses or Management Options  Diagnosis management comments: IV fluids Toradol morphine and Zofran ordered. will check a CBC BMP and urinalysis. To be reassessed after medication for adequate pain control and labs will be reevaluated. Patient was signed out to Dr. Pippa Sanchez for final disposition. MIPS       REASSESSMENT          CRITICAL CARE TIME   Total Critical Care time was  minutes, excluding separately reportable procedures. There was a high probability of clinically significant/life threatening deterioration in the patient's condition which required my urgent intervention. CONSULTS:  IP CONSULT TO UROLOGY    PROCEDURES:  Unless otherwise noted below, none     Procedures        FINAL IMPRESSION      1. Flank pain    2. Left ureteral stone    3. Ureterolithiasis          DISPOSITION/PLAN   DISPOSITION        PATIENT REFERRED TO:  Dolores Rai MD  [address] [facility] [address]  [phone]    Schedule an appointment as soon as possible for a visit  hospital follow up    Bony Wong MD  [address]  [phone]    Schedule an appointment as soon as possible for a visit  for hospital follow up      DISCHARGE MEDICATIONS:  Discharge Medication List as of 5/18/2021  4:06 PM        Controlled Substances Monitoring:     No flowsheet data found.     (Please note that portions of this note were completed with a voice recognition program.  Efforts were made to edit the dictations but occasionally words are mis-transcribed.)    Maricarmen Coronel MD (electronically signed)  Attending Emergency Physician            Maricarmen Coronel MD  05/22/21 5857

## 2021-05-17 NOTE — PROGRESS NOTES
HPI:          Patient is a 70 y.o. male in no acute distress. He is alert and oriented to person, place, and time. History  Patient was admitted to the hospital last night through the emergency department. Patient did get a CT scan upon admittance to the emergency department. This film was independently reviewed. This does show a 5 mm stone in the proximal left ureter. Patient was admitted to the floor. He did have an elevated white count of 19,000. Patient was given IV antibiotics and fluids overnight. Patient's laboratory values have improved. His left flank pain has resolved. Patient has never had stones in the past.  He has never seen urology in the past.  Currently he is pain-free. He is concerned about not having a bowel movement. I did review his KUB done this morning. This did show heavy bowel contents and no stone per se.     Currently  Patient is being seen into her today after going to the emergency department. Patient has been admitted to the hospital and discharged. He did go to back to the hospital with worsening left flank pain. Patient did have a repeat CT scan performed. This was independently reviewed today. This did show that he has a 4 to 5 mm stone in the left proximal ureter that has not significantly changed in position. Patient's pain has significantly improved since being in the emergency department. He reports no gross hematuria or dysuria. He is having no nausea vomiting or pain.   Past Medical History:   Diagnosis Date    Arthritis     Hyperlipidemia      Past Surgical History:   Procedure Laterality Date    JOINT REPLACEMENT Left 07/12/2018    knee    [place] TOTAL KNEE ARTHROPLASTY Left 7/12/2018    KNEE TOTAL ARTHROPLASTY performed by Severiano Bibles, MD at [address] Encounter Medications as of 5/17/2021   Medication Sig Dispense Refill    ciprofloxacin (CIPRO) 500 MG tablet Take 1 tablet by mouth 2 times daily for 10 days 14 tablet 0    tamsulosin (FLOMAX) 0.4 MG capsule Take 1 capsule by mouth daily 30 capsule 0    HYDROcodone-acetaminophen (NORCO) 5-325 MG per tablet Take 1 tablet by mouth every 6 hours as needed for Pain for up to 3 days. Intended supply: 3 days. Take lowest dose possible to manage pain (Patient not taking: Reported on 2021) 10 tablet 0    ondansetron (ZOFRAN ODT) 4 MG disintegrating tablet Take 1 tablet by mouth every 8 hours as needed for Nausea or Vomiting (Patient not taking: Reported on 2021) 9 tablet 3     No facility-administered encounter medications on file as of 2021. Current Outpatient Medications on File Prior to Visit   Medication Sig Dispense Refill    ciprofloxacin (CIPRO) 500 MG tablet Take 1 tablet by mouth 2 times daily for 10 days 14 tablet 0    tamsulosin (FLOMAX) 0.4 MG capsule Take 1 capsule by mouth daily 30 capsule 0    HYDROcodone-acetaminophen (NORCO) 5-325 MG per tablet Take 1 tablet by mouth every 6 hours as needed for Pain for up to 3 days. Intended supply: 3 days. Take lowest dose possible to manage pain (Patient not taking: Reported on 2021) 10 tablet 0    ondansetron (ZOFRAN ODT) 4 MG disintegrating tablet Take 1 tablet by mouth every 8 hours as needed for Nausea or Vomiting (Patient not taking: Reported on 2021) 9 tablet 3     No current facility-administered medications on file prior to visit. Patient has no known allergies. History reviewed. No pertinent family history. Social History     Tobacco Use   Smoking Status Former Smoker    Types: Cigarettes    Quit date: 2000    Years since quittin.3   Smokeless Tobacco Never Used       Social History     Substance and Sexual Activity   Alcohol Use No       Review of Systems   Constitutional: Negative for appetite change, chills and fever. Eyes: Negative for pain, redness and visual disturbance. Respiratory: Negative for cough, shortness of breath and wheezing.     Cardiovascular: Negative for chest pain and leg swelling. Gastrointestinal: Negative for abdominal pain, nausea and vomiting. Genitourinary: Negative for difficulty urinating, discharge, dysuria, flank pain, frequency, hematuria, scrotal swelling and testicular pain. Musculoskeletal: Negative for back pain, joint swelling and myalgias. Skin: Negative for color change, rash and wound. Neurological: Negative for dizziness, tremors and numbness. Hematological: Negative for adenopathy. Does not bruise/bleed easily. /82 (Site: Right Upper Arm, Position: Sitting, Cuff Size: Medium Adult) Comment: manual  Temp 98.7 °F (37.1 °C) (Temporal)   Wt 216 lb (98 kg)   BMI 32.84 kg/m²       PHYSICAL EXAM:  Constitutional: Patient in no acute distress; Neuro: alert and oriented to person place and time. Psych: Mood and affect normal.  Skin: Normal  Lungs: Respiratory effort normal  Cardiovascular:  Normal peripheral pulses  Abdomen: Soft, non-tender, non-distended with no CVA, flank pain, hepatosplenomegaly or hernia. Kidneys normal.  Bladder non-tender and not distended. Lymphatics: no palpable lymphadenopathy  Penis normal  Urethral meatus normal  Scrotal exam normal  Testicles normal bilaterally  Epididymis normal bilaterally  No evidence of inguinal hernia      Lab Results   Component Value Date    BUN 14 05/16/2021     Lab Results   Component Value Date    CREATININE 1.64 (H) 05/16/2021     No results found for: PSA    ASSESSMENT:  This is a 70 y.o. male with the following diagnoses:   Diagnosis Orders   1. Ureteral calculus     2. Hydronephrosis, unspecified hydronephrosis type         PLAN:  We did plan for left-sided holmium laser lithotripsy today. Patient is unsure whether or not he wants to have the procedure. We did discuss the procedure with him at length. He is amenable with the risks and benefits. He will go home discussed with his wife. He is concerned about his upcoming trip to [place].   I did tell him that he probably should have the procedure performed.

## 2021-05-18 ENCOUNTER — APPOINTMENT (OUTPATIENT)
Dept: GENERAL RADIOLOGY | Age: 71
End: 2021-05-18
Payer: MEDICARE

## 2021-05-18 ENCOUNTER — ANESTHESIA (OUTPATIENT)
Dept: OPERATING ROOM | Age: 71
End: 2021-05-18
Payer: MEDICARE

## 2021-05-18 ENCOUNTER — ANESTHESIA EVENT (OUTPATIENT)
Dept: OPERATING ROOM | Age: 71
End: 2021-05-18
Payer: MEDICARE

## 2021-05-18 VITALS
SYSTOLIC BLOOD PRESSURE: 159 MMHG | DIASTOLIC BLOOD PRESSURE: 91 MMHG | RESPIRATION RATE: 16 BRPM | HEIGHT: 68 IN | TEMPERATURE: 97.3 F | BODY MASS INDEX: 33.28 KG/M2 | HEART RATE: 69 BPM | OXYGEN SATURATION: 98 % | WEIGHT: 219.58 LBS

## 2021-05-18 VITALS — OXYGEN SATURATION: 100 % | DIASTOLIC BLOOD PRESSURE: 69 MMHG | SYSTOLIC BLOOD PRESSURE: 141 MMHG

## 2021-05-18 LAB
ABSOLUTE EOS #: 0.16 K/UL (ref 0–0.44)
ABSOLUTE IMMATURE GRANULOCYTE: 0.05 K/UL (ref 0–0.3)
ABSOLUTE LYMPH #: 2.07 K/UL (ref 1.1–3.7)
ABSOLUTE MONO #: 1.45 K/UL (ref 0.1–1.2)
BASOPHILS # BLD: 1 % (ref 0–2)
BASOPHILS ABSOLUTE: 0.08 K/UL (ref 0–0.2)
DIFFERENTIAL TYPE: ABNORMAL
EOSINOPHILS RELATIVE PERCENT: 1 % (ref 1–4)
HCT VFR BLD CALC: 32.7 % (ref 40.7–50.3)
HEMOGLOBIN: 10.7 G/DL (ref 13–17)
IMMATURE GRANULOCYTES: 0 %
LYMPHOCYTES # BLD: 16 % (ref 24–43)
MCH RBC QN AUTO: 32.2 PG (ref 25.2–33.5)
MCHC RBC AUTO-ENTMCNC: 32.7 G/DL (ref 28.4–34.8)
MCV RBC AUTO: 98.5 FL (ref 82.6–102.9)
MONOCYTES # BLD: 11 % (ref 3–12)
NRBC AUTOMATED: 0 PER 100 WBC
PDW BLD-RTO: 14.6 % (ref 11.8–14.4)
PLATELET # BLD: 262 K/UL (ref 138–453)
PLATELET ESTIMATE: ABNORMAL
PMV BLD AUTO: 9.8 FL (ref 8.1–13.5)
RBC # BLD: 3.32 M/UL (ref 4.21–5.77)
RBC # BLD: ABNORMAL 10*6/UL
SEG NEUTROPHILS: 71 % (ref 36–65)
SEGMENTED NEUTROPHILS ABSOLUTE COUNT: 8.9 K/UL (ref 1.5–8.1)
WBC # BLD: 12.7 K/UL (ref 3.5–11.3)
WBC # BLD: ABNORMAL 10*3/UL

## 2021-05-18 PROCEDURE — 2709999900 HC NON-CHARGEABLE SUPPLY: Performed by: UROLOGY

## 2021-05-18 PROCEDURE — C1758 CATHETER, URETERAL: HCPCS | Performed by: UROLOGY

## 2021-05-18 PROCEDURE — 2580000003 HC RX 258: Performed by: FAMILY MEDICINE

## 2021-05-18 PROCEDURE — 36415 COLL VENOUS BLD VENIPUNCTURE: CPT

## 2021-05-18 PROCEDURE — 3700000001 HC ADD 15 MINUTES (ANESTHESIA): Performed by: UROLOGY

## 2021-05-18 PROCEDURE — 85025 COMPLETE CBC W/AUTO DIFF WBC: CPT

## 2021-05-18 PROCEDURE — C1894 INTRO/SHEATH, NON-LASER: HCPCS | Performed by: UROLOGY

## 2021-05-18 PROCEDURE — 6370000000 HC RX 637 (ALT 250 FOR IP): Performed by: UROLOGY

## 2021-05-18 PROCEDURE — 3600000013 HC SURGERY LEVEL 3 ADDTL 15MIN: Performed by: UROLOGY

## 2021-05-18 PROCEDURE — 2580000003 HC RX 258: Performed by: NURSE ANESTHETIST, CERTIFIED REGISTERED

## 2021-05-18 PROCEDURE — 3600000003 HC SURGERY LEVEL 3 BASE: Performed by: UROLOGY

## 2021-05-18 PROCEDURE — 2580000003 HC RX 258: Performed by: UROLOGY

## 2021-05-18 PROCEDURE — C2617 STENT, NON-COR, TEM W/O DEL: HCPCS | Performed by: UROLOGY

## 2021-05-18 PROCEDURE — 6360000002 HC RX W HCPCS: Performed by: UROLOGY

## 2021-05-18 PROCEDURE — 94761 N-INVAS EAR/PLS OXIMETRY MLT: CPT

## 2021-05-18 PROCEDURE — 2500000003 HC RX 250 WO HCPCS: Performed by: UROLOGY

## 2021-05-18 PROCEDURE — C1769 GUIDE WIRE: HCPCS | Performed by: UROLOGY

## 2021-05-18 PROCEDURE — 96361 HYDRATE IV INFUSION ADD-ON: CPT

## 2021-05-18 PROCEDURE — 2500000003 HC RX 250 WO HCPCS: Performed by: NURSE ANESTHETIST, CERTIFIED REGISTERED

## 2021-05-18 PROCEDURE — 7100000001 HC PACU RECOVERY - ADDTL 15 MIN: Performed by: UROLOGY

## 2021-05-18 PROCEDURE — 3209999900 FLUORO FOR SURGICAL PROCEDURES

## 2021-05-18 PROCEDURE — 6360000002 HC RX W HCPCS: Performed by: FAMILY MEDICINE

## 2021-05-18 PROCEDURE — G0378 HOSPITAL OBSERVATION PER HR: HCPCS

## 2021-05-18 PROCEDURE — 6360000002 HC RX W HCPCS: Performed by: NURSE ANESTHETIST, CERTIFIED REGISTERED

## 2021-05-18 PROCEDURE — 3700000000 HC ANESTHESIA ATTENDED CARE: Performed by: UROLOGY

## 2021-05-18 PROCEDURE — 7100000000 HC PACU RECOVERY - FIRST 15 MIN: Performed by: UROLOGY

## 2021-05-18 DEVICE — URETERAL STENT
Type: IMPLANTABLE DEVICE | Site: URETER | Status: FUNCTIONAL
Brand: PERCUFLEX™ PLUS

## 2021-05-18 RX ORDER — PROPOFOL 10 MG/ML
INJECTION, EMULSION INTRAVENOUS PRN
Status: DISCONTINUED | OUTPATIENT
Start: 2021-05-18 | End: 2021-05-18 | Stop reason: SDUPTHER

## 2021-05-18 RX ORDER — SODIUM CHLORIDE 0.9 % (FLUSH) 0.9 %
5-40 SYRINGE (ML) INJECTION PRN
Status: DISCONTINUED | OUTPATIENT
Start: 2021-05-18 | End: 2021-05-18 | Stop reason: HOSPADM

## 2021-05-18 RX ORDER — LIDOCAINE HYDROCHLORIDE 20 MG/ML
JELLY TOPICAL PRN
Status: DISCONTINUED | OUTPATIENT
Start: 2021-05-18 | End: 2021-05-18 | Stop reason: HOSPADM

## 2021-05-18 RX ORDER — SODIUM CHLORIDE 0.9 % (FLUSH) 0.9 %
5-40 SYRINGE (ML) INJECTION EVERY 12 HOURS SCHEDULED
Status: DISCONTINUED | OUTPATIENT
Start: 2021-05-18 | End: 2021-05-18 | Stop reason: HOSPADM

## 2021-05-18 RX ORDER — MORPHINE SULFATE 2 MG/ML
2 INJECTION, SOLUTION INTRAMUSCULAR; INTRAVENOUS
Status: DISCONTINUED | OUTPATIENT
Start: 2021-05-18 | End: 2021-05-18 | Stop reason: HOSPADM

## 2021-05-18 RX ORDER — ONDANSETRON 2 MG/ML
INJECTION INTRAMUSCULAR; INTRAVENOUS PRN
Status: DISCONTINUED | OUTPATIENT
Start: 2021-05-18 | End: 2021-05-18 | Stop reason: SDUPTHER

## 2021-05-18 RX ORDER — ONDANSETRON 2 MG/ML
4 INJECTION INTRAMUSCULAR; INTRAVENOUS
Status: DISCONTINUED | OUTPATIENT
Start: 2021-05-18 | End: 2021-05-18 | Stop reason: HOSPADM

## 2021-05-18 RX ORDER — SODIUM CHLORIDE, SODIUM LACTATE, POTASSIUM CHLORIDE, CALCIUM CHLORIDE 600; 310; 30; 20 MG/100ML; MG/100ML; MG/100ML; MG/100ML
INJECTION, SOLUTION INTRAVENOUS CONTINUOUS PRN
Status: DISCONTINUED | OUTPATIENT
Start: 2021-05-18 | End: 2021-05-18 | Stop reason: SDUPTHER

## 2021-05-18 RX ORDER — SODIUM CHLORIDE 9 MG/ML
25 INJECTION, SOLUTION INTRAVENOUS PRN
Status: DISCONTINUED | OUTPATIENT
Start: 2021-05-18 | End: 2021-05-18 | Stop reason: HOSPADM

## 2021-05-18 RX ORDER — HYDRALAZINE HYDROCHLORIDE 20 MG/ML
10 INJECTION INTRAMUSCULAR; INTRAVENOUS ONCE
Status: COMPLETED | OUTPATIENT
Start: 2021-05-18 | End: 2021-05-18

## 2021-05-18 RX ORDER — FENTANYL CITRATE 50 UG/ML
50 INJECTION, SOLUTION INTRAMUSCULAR; INTRAVENOUS EVERY 5 MIN PRN
Status: DISCONTINUED | OUTPATIENT
Start: 2021-05-18 | End: 2021-05-18 | Stop reason: HOSPADM

## 2021-05-18 RX ORDER — DEXAMETHASONE SODIUM PHOSPHATE 4 MG/ML
INJECTION, SOLUTION INTRA-ARTICULAR; INTRALESIONAL; INTRAMUSCULAR; INTRAVENOUS; SOFT TISSUE PRN
Status: DISCONTINUED | OUTPATIENT
Start: 2021-05-18 | End: 2021-05-18 | Stop reason: SDUPTHER

## 2021-05-18 RX ORDER — HYDROCODONE BITARTRATE AND ACETAMINOPHEN 5; 325 MG/1; MG/1
2 TABLET ORAL PRN
Status: DISCONTINUED | OUTPATIENT
Start: 2021-05-18 | End: 2021-05-18 | Stop reason: HOSPADM

## 2021-05-18 RX ORDER — HYDROCODONE BITARTRATE AND ACETAMINOPHEN 5; 325 MG/1; MG/1
1 TABLET ORAL EVERY 6 HOURS PRN
Qty: 10 TABLET | Refills: 0 | Status: SHIPPED | OUTPATIENT
Start: 2021-05-18 | End: 2021-05-19

## 2021-05-18 RX ORDER — FENTANYL CITRATE 50 UG/ML
INJECTION, SOLUTION INTRAMUSCULAR; INTRAVENOUS PRN
Status: DISCONTINUED | OUTPATIENT
Start: 2021-05-18 | End: 2021-05-18 | Stop reason: SDUPTHER

## 2021-05-18 RX ORDER — LABETALOL HYDROCHLORIDE 5 MG/ML
10 INJECTION, SOLUTION INTRAVENOUS ONCE
Status: COMPLETED | OUTPATIENT
Start: 2021-05-18 | End: 2021-05-18

## 2021-05-18 RX ORDER — CEFAZOLIN SODIUM 2 G/50ML
2000 SOLUTION INTRAVENOUS
Status: COMPLETED | OUTPATIENT
Start: 2021-05-18 | End: 2021-05-18

## 2021-05-18 RX ORDER — LIDOCAINE HYDROCHLORIDE 20 MG/ML
INJECTION, SOLUTION EPIDURAL; INFILTRATION; INTRACAUDAL; PERINEURAL PRN
Status: DISCONTINUED | OUTPATIENT
Start: 2021-05-18 | End: 2021-05-18 | Stop reason: SDUPTHER

## 2021-05-18 RX ORDER — SODIUM CHLORIDE, SODIUM LACTATE, POTASSIUM CHLORIDE, CALCIUM CHLORIDE 600; 310; 30; 20 MG/100ML; MG/100ML; MG/100ML; MG/100ML
INJECTION, SOLUTION INTRAVENOUS CONTINUOUS
Status: DISCONTINUED | OUTPATIENT
Start: 2021-05-18 | End: 2021-05-18 | Stop reason: HOSPADM

## 2021-05-18 RX ORDER — HYDROCODONE BITARTRATE AND ACETAMINOPHEN 5; 325 MG/1; MG/1
1 TABLET ORAL PRN
Status: DISCONTINUED | OUTPATIENT
Start: 2021-05-18 | End: 2021-05-18 | Stop reason: HOSPADM

## 2021-05-18 RX ADMIN — SODIUM CHLORIDE, POTASSIUM CHLORIDE, SODIUM LACTATE AND CALCIUM CHLORIDE: 600; 310; 30; 20 INJECTION, SOLUTION INTRAVENOUS at 14:25

## 2021-05-18 RX ADMIN — ONDANSETRON 4 MG: 2 INJECTION INTRAMUSCULAR; INTRAVENOUS at 12:13

## 2021-05-18 RX ADMIN — FENTANYL CITRATE 25 MCG: 50 INJECTION, SOLUTION INTRAMUSCULAR; INTRAVENOUS at 11:35

## 2021-05-18 RX ADMIN — SODIUM CHLORIDE, PRESERVATIVE FREE 10 ML: 5 INJECTION INTRAVENOUS at 08:47

## 2021-05-18 RX ADMIN — SODIUM CHLORIDE, POTASSIUM CHLORIDE, SODIUM LACTATE AND CALCIUM CHLORIDE: 600; 310; 30; 20 INJECTION, SOLUTION INTRAVENOUS at 05:30

## 2021-05-18 RX ADMIN — DEXAMETHASONE SODIUM PHOSPHATE 4 MG: 4 INJECTION, SOLUTION INTRAMUSCULAR; INTRAVENOUS at 11:39

## 2021-05-18 RX ADMIN — LABETALOL HYDROCHLORIDE 10 MG: 5 INJECTION INTRAVENOUS at 12:58

## 2021-05-18 RX ADMIN — PROPOFOL 180 MG: 10 INJECTION, EMULSION INTRAVENOUS at 11:35

## 2021-05-18 RX ADMIN — CEFAZOLIN SODIUM 2000 MG: 2 SOLUTION INTRAVENOUS at 11:28

## 2021-05-18 RX ADMIN — LIDOCAINE HYDROCHLORIDE 100 MG: 20 INJECTION, SOLUTION EPIDURAL; INFILTRATION; INTRACAUDAL; PERINEURAL at 11:35

## 2021-05-18 RX ADMIN — SODIUM CHLORIDE, POTASSIUM CHLORIDE, SODIUM LACTATE AND CALCIUM CHLORIDE: 600; 310; 30; 20 INJECTION, SOLUTION INTRAVENOUS at 12:05

## 2021-05-18 RX ADMIN — FENTANYL CITRATE 25 MCG: 50 INJECTION, SOLUTION INTRAMUSCULAR; INTRAVENOUS at 11:48

## 2021-05-18 RX ADMIN — HYDRALAZINE HYDROCHLORIDE 10 MG: 20 INJECTION INTRAMUSCULAR; INTRAVENOUS at 13:35

## 2021-05-18 RX ADMIN — WATER 1000 MG: 1 INJECTION INTRAMUSCULAR; INTRAVENOUS; SUBCUTANEOUS at 08:47

## 2021-05-18 RX ADMIN — SODIUM CHLORIDE, POTASSIUM CHLORIDE, SODIUM LACTATE AND CALCIUM CHLORIDE: 600; 310; 30; 20 INJECTION, SOLUTION INTRAVENOUS at 11:32

## 2021-05-18 ASSESSMENT — PAIN SCALES - GENERAL
PAINLEVEL_OUTOF10: 0
PAINLEVEL_OUTOF10: 0

## 2021-05-18 NOTE — PROGRESS NOTES
Comprehensive Nutrition Assessment    Type and Reason for Visit:  Initial (nutrition screen)    Nutrition Recommendations/Plan:   1. Continue NPO  2. Upon resumption of diet, start at clears, and advance as tolerated to low sodium diet  3. Add fiber foods as tolerated to goal of 25 g per day, given handout for home use  4. Suggest probiotic for bowel regularity    Nutrition Assessment:  Altered GI function related to altered GI function as evidenced by intake 0-25%, poor intake prior to admission, constipation, and abdominal, flank pain. Pt reports was admitted last week for kidney stone, and went home Friday, was recovering, and was taking it slow for diet. Friday, had a full meal (chicken, mash potato, peas) and then on Saturday, did not feel well, no appetite, weak, just did not feel like eating, and just had toast for breakfast. His pain got worse, so he came back. Pt states a normal weight is 218#, and is currently at 219#. He is near his UBW. Bowel movements used to be 1-2x daily, and now has slowed down. Labs reviewed, creatinine is elevated, blood sugar is also acutely elevated. Pt reports does not take any supplements at home, is hungry now, however, wants to be able to tolerate his food. He states he has been trying to drop a few pounds. He is in moderate nutrition risk, but does not meet criteria for malnutrition. Malnutrition Assessment:  Malnutrition Status:  No malnutrition    Context:  Acute Illness     Findings of the 6 clinical characteristics of malnutrition:  Energy Intake:  Mild decrease in energy intake (Comment)  Weight Loss:  No significant weight loss     Body Fat Loss:  No significant body fat loss     Muscle Mass Loss:  No significant muscle mass loss    Fluid Accumulation:  No significant fluid accumulation Extremities   Strength:  Not Performed    Estimated Daily Nutrient Needs:  Energy (kcal):  7665-9037 (15-18);  Weight Used for Energy Requirements:  Current     Protein (g):

## 2021-05-18 NOTE — ED PROVIDER NOTES
Potassium 4.1 3.7 - 5.3 mmol/L    Chloride 101 98 - 107 mmol/L    CO2 26 20 - 31 mmol/L    Anion Gap 10 9 - 17 mmol/L    Alkaline Phosphatase 85 40 - 129 U/L    ALT 35 5 - 41 U/L    AST 25 <40 U/L    Total Bilirubin 0.66 0.3 - 1.2 mg/dL    Total Protein 6.5 6.4 - 8.3 g/dL    Albumin 4.2 3.5 - 5.2 g/dL    Albumin/Globulin Ratio 1.8 1.0 - 2.5    GFR Non-African American 40 (L) >60 mL/min    GFR  49 (L) >60 mL/min    GFR Comment          GFR Staging         Urinalysis Reflex to Culture    Specimen: Urine, clean catch   Result Value Ref Range    Color, UA YELLOW YELLOW    Turbidity UA CLEAR CLEAR    Glucose, Ur NEGATIVE NEGATIVE    Bilirubin Urine NEGATIVE NEGATIVE    Ketones, Urine 1+ (A) NEGATIVE    Specific Gravity, UA >1.030 (H) 1.010 - 1.020    Urine Hgb NEGATIVE NEGATIVE    pH, UA 6.0 5.0 - 9.0    Protein, UA NEGATIVE NEGATIVE    Urobilinogen, Urine Normal Normal    Nitrite, Urine NEGATIVE NEGATIVE    Leukocyte Esterase, Urine NEGATIVE NEGATIVE    Urinalysis Comments NOT REPORTED    Microscopic Urinalysis   Result Value Ref Range    -          WBC, UA 0 TO 2 0 - 5 /HPF    RBC, UA 0 TO 2 0 - 2 /HPF    Casts UA NOT REPORTED /LPF    Crystals, UA NOT REPORTED None /HPF    Epithelial Cells UA 0 TO 2 0 - 5 /HPF    Renal Epithelial, UA NOT REPORTED 0 /HPF    Bacteria, UA TRACE (A) None    Mucus, UA 1+ (A) None    Trichomonas, UA NOT REPORTED None    Amorphous, UA NOT REPORTED None    Other Observations UA NOT REPORTED NOT REQ.     Yeast, UA NOT REPORTED None   CBC auto differential   Result Value Ref Range    WBC 12.7 (H) 3.5 - 11.3 k/uL    RBC 3.32 (L) 4.21 - 5.77 m/uL    Hemoglobin 10.7 (L) 13.0 - 17.0 g/dL    Hematocrit 32.7 (L) 40.7 - 50.3 %    MCV 98.5 82.6 - 102.9 fL    MCH 32.2 25.2 - 33.5 pg    MCHC 32.7 28.4 - 34.8 g/dL    RDW 14.6 (H) 11.8 - 14.4 %    Platelets 833 833 - 629 k/uL    MPV 9.8 8.1 - 13.5 fL    NRBC Automated 0.0 0.0 per 100 WBC    Differential Type NOT REPORTED     Seg Neutrophils CALL TO O.R.     Question:  Antimicrobial Indications  Answer:  Surgical Prophylaxis    Last MAR action: Stopped - by Ruben Ocasio on 05/18/21 at [address] [address]    05/17/21 1915 05/17/21 1905  morphine (PF) injection 2 mg  ONCE      Last MAR action: Given - by Ramses Guevara on 05/17/21 at [facility], [address] [facility]    05/17/21 1915 05/17/21 1905  ketorolac (TORADOL) injection 30 mg  ONCE      Last MAR action: Given - by Ramses Fernandesus on 05/17/21 at [address] [facility], [address] [place] [address]    05/17/21 1915 05/17/21 1905  ondansetron (ZOFRAN) injection 4 mg  ONCE      Last MAR action: Given - by Ramses Guevara on 05/17/21 at [address] [place] [address], [address] [facility]    05/17/21 1915 05/17/21 1905  0.9 % sodium chloride bolus  ONCE      Last MAR action: Stopped - by Vahe Sorto on 05/17/21 at [facility]            Final Impression      1. Flank pain    2. Left ureteral stone    3.  Ureterolithiasis        DISPOSITION Decision To Admit 05/17/2021 08:47:41 PM     (Please note that portions of this note may have been completed with a voice recognition program. Efforts were made to edit the dictations but occasionally words are mis-transcribed.)    Ismael , MD  [facility], [place]  05/17/21 2051       Ismael Pierce MD  05/22/21 2452

## 2021-05-18 NOTE — H&P (VIEW-ONLY)
History and Physical    Patient:  Earnest Garcia  MRN: [id number]    Chief Complaint: Left flank pain and lower abdominal pain    History Obtained From:  patient, electronic medical record    PCP: Juarez Espinoza MD    History of Present Illness: The patient is a 70 y.o. male who presented to the emergency room for evaluation following Dr. Lina May recommendation. Patient presented with left flank pain with known history of kidney stone. He was admitted on 5/13/2021 and discharged on 5/14/2021 when he was admitted for kidney stone which ultimately did not need stenting at that time and he was scheduled for outpatient follow-up with urology on Monday, 5/17/2021. Unfortunately he was readmitted on 5/16/2021 who presented with recurrent abdominal pain as well as nausea without vomiting. His CAT scan from his previous admission showed a 6 mm UPJ calculus with an elevated white count of 17,000. His symptoms had improved and he was discharged. The distal calculus apparently was gone however there was a proximal calculi noted on the CT scan. Patient was admitted for pain control and then discharged. During his follow-up on 5/17/2021 with urology he was returned to the emergency room for follow-up and evaluation and admitted. He is planned for cystoscopy with stent placement this morning. Patient did not show any symptoms of acute kidney injury or sepsis. Past Medical History:        Diagnosis Date    Arthritis     Hyperlipidemia     Kidney stone        Past Surgical History:        Procedure Laterality Date    JOINT REPLACEMENT Left 07/12/2018    knee    IL TOTAL KNEE ARTHROPLASTY Left 7/12/2018    KNEE TOTAL ARTHROPLASTY performed by Koby Candelario MD at [place] [address] History:   No family history on file. Social History:   TOBACCO:   reports that he quit smoking about 21 years ago. His smoking use included cigarettes.  He has never used smokeless tobacco.  ETOH:   reports no history of alcohol use. ELICIT DRUG USE:    Social History     Substance and Sexual Activity   Drug Use No     OCCUPATION: Retired      Allergies:  Patient has no known allergies. Medications Prior to Admission:    Prior to Admission medications    Medication Sig Start Date End Date Taking? Authorizing Provider   ciprofloxacin (CIPRO) 500 MG tablet Take 1 tablet by mouth 2 times daily for 10 days 5/16/21 5/26/21 Yes Dacia Burrell MD   ondansetron (ZOFRAN ODT) 4 MG disintegrating tablet Take 1 tablet by mouth every 8 hours as needed for Nausea or Vomiting 5/14/21 5/26/21 Yes SHEYLA Michaud CNP   tamsulosin [facility]) 0.4 MG capsule Take 1 capsule by mouth daily 5/14/21  Yes SHEYLA Michaud CNP       Review of Systems:  Constitutional:negative  for fevers, and negative for chills. Eyes: negative for visual disturbance   ENT: negative for sore throat, negative nasal congestion, and negative for earache  Respiratory: negative for shortness of breath, negative for cough, and negative for wheezing  Cardiovascular: negative for chest pain, negative for palpitations, and negative for syncope  Gastrointestinal: positive for abdominal pain, negative for nausea,negative for vomiting, negative for diarrhea, negative for constipation, and negative for hematochezia or melena  Genitourinary: negative for dysuria, negative for urinary urgency, negative for urinary frequency, and negative for hematuria  Skin: negative for skin rash, and negative for skin lesions  Neurological: negative for unilateral weakness, numbness or tingling.     Physical Exam:    Vitals:   Temp: 97.7 °F (36.5 °C)  BP: (!) 152/91  Resp: 18  Pulse: 67  SpO2: 97 %  24HR INTAKE/OUTPUT:      Intake/Output Summary (Last 24 hours) at 5/18/2021 1121  Last data filed at 5/18/2021 0900  Gross per 24 hour   Intake 736.6 ml   Output 900 ml   Net -163.4 ml       Exam:  GEN:  alert and oriented to person, place and time, well-developed and well-nourished, in no acute distress  EYES: No gross abnormalities. , PERRL and EOMI  NECK: normal, supple, no lymphadenopathy,  no carotid bruits  PULM: clear to auscultation bilaterally- no wheezes, rales or rhonchi, normal air movement, no respiratory distress  COR: regular rate & rhythm, no murmurs, no gallops, S1 normal and S2 normal  ABD:  soft, non-tender, non-distended, normal bowel sounds, no masses or organomegaly, left CV tenderness  EXT:   no cyanosis, clubbing or edema present    NEURO: follows commands, ESTRADA, no deficits  SKIN:  no rashes or significant lesions  -----------------------------------------------------------------  Diagnostic Data:   Lab Results   Component Value Date    WBC 12.7 (H) 05/18/2021    HGB 10.7 (L) 05/18/2021     05/18/2021       Lab Results   Component Value Date    BUN 17 05/17/2021    CREATININE 1.69 (H) 05/17/2021     05/17/2021    K 4.1 05/17/2021    CALCIUM 9.3 05/17/2021     05/17/2021    CO2 26 05/17/2021    LABGLOM 40 (L) 05/17/2021       Lab Results   Component Value Date    WBCUA 0 TO 2 05/17/2021    RBCUA 0 TO 2 05/17/2021    EPITHUA 0 TO 2 05/17/2021    LEUKOCYTESUR NEGATIVE 05/17/2021    SPECGRAV >1.030 (H) 05/17/2021    GLUCOSEU NEGATIVE 05/17/2021    KETUA 1+ (A) 05/17/2021    PROTEINU NEGATIVE 05/17/2021    HGBUR NEGATIVE 05/17/2021    CASTUA NOT REPORTED 05/17/2021    CRYSTUA NOT REPORTED 05/17/2021    BACTERIA TRACE (A) 05/17/2021    YEAST NOT REPORTED 05/17/2021       Lab Results   Component Value Date    TROPONINT NOT REPORTED 05/13/2021    PROBNP 147 05/13/2021       No results found. FLUORO FOR SURGICAL PROCEDURES    (Results Pending)       Assessment:    Principal Problem:    Hydronephrosis with urinary obstruction due to ureteral calculus  Active Problems:    Essential hypertension  Resolved Problems:    * No resolved hospital problems.  *      Patient Active Problem List    Diagnosis Date Noted    Urinary tract obstruction due to kidney stone 05/15/2021    Hydronephrosis with urinary obstruction due to ureteral calculus 05/13/2021    Essential hypertension 02/19/2021    Primary osteoarthritis of left knee 07/12/2018       Plan:     · This patient requires overnight observation because of hydronephrosis with urinary obstruction due to ureteral calculi  · Factors affecting the medical complexity of this patient include essential hypertension  · Estimated length of stay is 1 days  · Hydronephrosis with ureteral calculi with urinary obstruction  · Pain management  · N.p.o.  · Appreciate urologyplan cystoscopy this morning  · Lactated Ringer's at 125 mL/h  · Rocephin IV 1 g daily  · Essential hypertension  · Diet controlled  · High risk medication monitoring: None  · Discharge plan: Later today    CORE MEASURES  DVT prophylaxis: Lovenox  Decubitus ulcer present on admission: No  CODE STATUS: FULL CODE  Nutrition Status: good   Physical therapy: No   Old Charts reviewed: Yes  EKG Reviewed: No  Advance Directive Addressed: Yes    SHEYLA Correa CNP, SHEYLA, NP-C  5/18/2021, 11:21 AM

## 2021-05-18 NOTE — CARE COORDINATION
Discussed that the patient has Dr Rosalba Ontiveros listed as PCP but he has retired. States he was in [place] until recently. States he plans to get established with someone soon. List given of providers accepting as well as Dr Debra Danielson new contact info in chart. Spoke with patient regarding importance of follow up appointment after a hospital visit. Patient states that he is leaving for [place] on Thursday and is not going to miss it because it is his grandson's graduation. States Dr Benjamin Fallon said he could pull his own stent on Thursday then head to [place]. Discussed importance of seeking care should complication arise- bleeding, fever, not feeling well. Discussed importance of follow up when returns from [place].

## 2021-05-18 NOTE — PROGRESS NOTES
Patient treated with 10 mg labetalol and 10 mg hydralazine per anesthesia. Blood pressure 156/63 at this time and anesthesia okay with patient going back up to the floor at this time.

## 2021-05-18 NOTE — ED NOTES
Called Dr Dirk Sawant via answering service, connected call to Dr Megan Washburn.       Giancarlo Bruno  05/17/21 2048

## 2021-05-18 NOTE — H&P
of alcohol use. ELICIT DRUG USE:    Social History     Substance and Sexual Activity   Drug Use No     OCCUPATION: Retired      Allergies:  Patient has no known allergies. Medications Prior to Admission:    Prior to Admission medications    Medication Sig Start Date End Date Taking? Authorizing Provider   ciprofloxacin (CIPRO) 500 MG tablet Take 1 tablet by mouth 2 times daily for 10 days 5/16/21 5/26/21 Yes María Myers MD   ondansetron (ZOFRAN ODT) 4 MG disintegrating tablet Take 1 tablet by mouth every 8 hours as needed for Nausea or Vomiting 5/14/21 5/26/21 Yes SHEYLA Alicia CNP   tamsulosin [facility]) 0.4 MG capsule Take 1 capsule by mouth daily 5/14/21  Yes SHEYLA Alicia CNP       Review of Systems:  Constitutional:negative  for fevers, and negative for chills. Eyes: negative for visual disturbance   ENT: negative for sore throat, negative nasal congestion, and negative for earache  Respiratory: negative for shortness of breath, negative for cough, and negative for wheezing  Cardiovascular: negative for chest pain, negative for palpitations, and negative for syncope  Gastrointestinal: positive for abdominal pain, negative for nausea,negative for vomiting, negative for diarrhea, negative for constipation, and negative for hematochezia or melena  Genitourinary: negative for dysuria, negative for urinary urgency, negative for urinary frequency, and negative for hematuria  Skin: negative for skin rash, and negative for skin lesions  Neurological: negative for unilateral weakness, numbness or tingling.     Physical Exam:    Vitals:   Temp: 97.7 °F (36.5 °C)  BP: (!) 152/91  Resp: 18  Pulse: 67  SpO2: 97 %  24HR INTAKE/OUTPUT:      Intake/Output Summary (Last 24 hours) at 5/18/2021 1121  Last data filed at 5/18/2021 0900  Gross per 24 hour   Intake 736.6 ml   Output 900 ml   Net -163.4 ml       Exam:  GEN:  alert and oriented to person, place and time, well-developed and well-nourished, in no acute distress  EYES: No gross abnormalities. , PERRL and EOMI  NECK: normal, supple, no lymphadenopathy,  no carotid bruits  PULM: clear to auscultation bilaterally- no wheezes, rales or rhonchi, normal air movement, no respiratory distress  COR: regular rate & rhythm, no murmurs, no gallops, S1 normal and S2 normal  ABD:  soft, non-tender, non-distended, normal bowel sounds, no masses or organomegaly, left CV tenderness  EXT:   no cyanosis, clubbing or edema present    NEURO: follows commands, ESTRADA, no deficits  SKIN:  no rashes or significant lesions  -----------------------------------------------------------------  Diagnostic Data:   Lab Results   Component Value Date    WBC 12.7 (H) 05/18/2021    HGB 10.7 (L) 05/18/2021     05/18/2021       Lab Results   Component Value Date    BUN 17 05/17/2021    CREATININE 1.69 (H) 05/17/2021     05/17/2021    K 4.1 05/17/2021    CALCIUM 9.3 05/17/2021     05/17/2021    CO2 26 05/17/2021    LABGLOM 40 (L) 05/17/2021       Lab Results   Component Value Date    WBCUA 0 TO 2 05/17/2021    RBCUA 0 TO 2 05/17/2021    EPITHUA 0 TO 2 05/17/2021    LEUKOCYTESUR NEGATIVE 05/17/2021    SPECGRAV >1.030 (H) 05/17/2021    GLUCOSEU NEGATIVE 05/17/2021    KETUA 1+ (A) 05/17/2021    PROTEINU NEGATIVE 05/17/2021    HGBUR NEGATIVE 05/17/2021    CASTUA NOT REPORTED 05/17/2021    CRYSTUA NOT REPORTED 05/17/2021    BACTERIA TRACE (A) 05/17/2021    YEAST NOT REPORTED 05/17/2021       Lab Results   Component Value Date    TROPONINT NOT REPORTED 05/13/2021    PROBNP 147 05/13/2021       No results found. FLUORO FOR SURGICAL PROCEDURES    (Results Pending)       Assessment:    Principal Problem:    Hydronephrosis with urinary obstruction due to ureteral calculus  Active Problems:    Essential hypertension  Resolved Problems:    * No resolved hospital problems.  *      Patient Active Problem List    Diagnosis Date Noted    Urinary tract obstruction due to kidney stone 05/15/2021    Hydronephrosis with urinary obstruction due to ureteral calculus 05/13/2021    Essential hypertension 02/19/2021    Primary osteoarthritis of left knee 07/12/2018       Plan:     · This patient requires overnight observation because of hydronephrosis with urinary obstruction due to ureteral calculi  · Factors affecting the medical complexity of this patient include essential hypertension  · Estimated length of stay is 1 days  · Hydronephrosis with ureteral calculi with urinary obstruction  · Pain management  · N.p.o.  · Appreciate urology-plan cystoscopy this morning  · Lactated Ringer's at 125 mL/h  · Rocephin IV 1 g daily  · Essential hypertension  · Diet controlled  · High risk medication monitoring: None  · Discharge plan: Later today    CORE MEASURES  DVT prophylaxis: Lovenox  Decubitus ulcer present on admission: No  CODE STATUS: FULL CODE  Nutrition Status: good   Physical therapy: No   Old Charts reviewed: Yes  EKG Reviewed: No  Advance Directive Addressed: Yes    SHEYLA Mariee CNP, SHEYLA, NP-C  5/18/2021, 11:21 AM

## 2021-05-18 NOTE — DISCHARGE SUMMARY
and that was cleared by urology. He will follow-up with urology as an outpatient upon his return. Consultants:  Dr. Kapil Dillard, urology    Procedures: Cystoscopy with stent insertion    Complications: none    Discharge Condition: good    Exam:  GEN:  alert and oriented to person, place and time, well-developed and well-nourished, in no acute distress  EYES:  No gross abnormalities. , PERRL and EOMI  NECK: normal, supple, no lymphadenopathy,  no carotid bruits  PULM: clear to auscultation bilaterally- no wheezes, rales or rhonchi, normal air movement, no respiratory distress  COR:   regular rate & rhythm, no murmurs, no gallops, S1 normal and S2 normal  ABD:    soft, non-tender, non-distended, normal bowel sounds, no masses or organomegaly, left CV tenderness  EXT:    no cyanosis, clubbing or edema present    NEURO: follows commands, ESTRADA, no deficits  SKIN:   no rashes or significant lesions    Significant Diagnostic Studies:   Lab Results   Component Value Date    WBC 12.7 (H) 05/18/2021    HGB 10.7 (L) 05/18/2021     05/18/2021       Lab Results   Component Value Date    BUN 17 05/17/2021    CREATININE 1.69 (H) 05/17/2021     05/17/2021    K 4.1 05/17/2021    CALCIUM 9.3 05/17/2021     05/17/2021    CO2 26 05/17/2021    LABGLOM 40 (L) 05/17/2021       Lab Results   Component Value Date    WBCUA 0 TO 2 05/17/2021    RBCUA 0 TO 2 05/17/2021    EPITHUA 0 TO 2 05/17/2021    LEUKOCYTESUR NEGATIVE 05/17/2021    SPECGRAV >1.030 (H) 05/17/2021    GLUCOSEU NEGATIVE 05/17/2021    KETUA 1+ (A) 05/17/2021    PROTEINU NEGATIVE 05/17/2021    HGBUR NEGATIVE 05/17/2021    CASTUA NOT REPORTED 05/17/2021    CRYSTUA NOT REPORTED 05/17/2021    BACTERIA TRACE (A) 05/17/2021    YEAST NOT REPORTED 05/17/2021       FLUORO FOR SURGICAL PROCEDURES    Result Date: 5/18/2021  Radiology exam is complete. No Radiologist dictation. Please follow up with ordering provider.        Assessment and Plan:  Patient Active Problem List    Diagnosis Date Noted    Urinary tract obstruction due to kidney stone 05/15/2021    Hydronephrosis with urinary obstruction due to ureteral calculus 05/13/2021    Essential hypertension 02/19/2021    Primary osteoarthritis of left knee 07/12/2018        Discharge Medications:       Becky Dupont \"Cheri\"   Home Medication Instructions PSM:[id number]    Printed on:05/18/21 2655   Medication Information                      ciprofloxacin (CIPRO) 500 MG tablet  Take 1 tablet by mouth 2 times daily for 10 days             HYDROcodone-acetaminophen (NORCO) 5-325 MG per tablet  Take 1 tablet by mouth every 6 hours as needed for Pain for up to 3 days. Intended supply: 3 days. Take lowest dose possible to manage pain             ondansetron (ZOFRAN ODT) 4 MG disintegrating tablet  Take 1 tablet by mouth every 8 hours as needed for Nausea or Vomiting             tamsulosin (FLOMAX) 0.4 MG capsule  Take 1 capsule by mouth daily                 Patient Instructions:    Activity: activity as tolerated  Diet: encourage fluids  Wound Care: none needed  Other: None    Disposition:   Discharge to Home    Follow up:  Patient will be followed by Dr. Татьяна Jaimes in 4 weeks    CORE MEASURES on Discharge (if applicable)  ACE/ARB in CHF: NA  Statin in MI: NA  ASA in MI: NA  Statin in CVA: NA  Antiplatelet in CVA: NA    Total time spent on discharge services: 35 minutes    Including the following activities:  Evaluation and Management of patient  Discussion with patient and/or surrogate about current care plan  Coordination with Case Management and/or   Coordination of care with Consultants (if applicable)   Coordination of care with Receiving Facility Physician (if applicable)  Completion of DME forms (if applicable)  Preparation of Discharge Summary  Preparation of Medication Reconciliation  Preparation of Discharge Prescriptions    Signed:  SHEYLA Luna - CNP, APRJAYCEE, NP-C  5/18/2021, 3:30

## 2021-05-18 NOTE — DISCHARGE INSTR - DIET

## 2021-05-18 NOTE — ANESTHESIA POSTPROCEDURE EVALUATION
Department of Anesthesiology  Postprocedure Note    Patient: Richard Rizo  MRN: [id number]  YOB: 1950  Date of evaluation: 5/18/2021  Time:  2:10 PM     Procedure Summary     Date: 05/18/21 Room / Location: [facility]    Anesthesia Start: 1132 Anesthesia Stop: 1231    Procedures:       CYSTOSCOPY URETEROSCOPY (Left )      CYSTOSCOPY   STENT INSERTION (Left ) Diagnosis: (LEFT URETERAL CALCULUS)    Surgeons: Ted Powell MD Responsible Provider: SHEYLA Kuhn CRNA    Anesthesia Type: general ASA Status: 2          Anesthesia Type: general    Luisa Phase I: Luisa Score: 10    Luisa Phase II:      Last vitals: Reviewed and per EMR flowsheets. Anesthesia Post Evaluation    Patient location during evaluation: PACU  Patient participation: complete - patient participated  Level of consciousness: awake and alert  Pain score: 0  Airway patency: patent  Nausea & Vomiting: no nausea and no vomiting  Complications: no  Cardiovascular status: hemodynamically stable and hypertensive (initially hypertensive, rx'd with labetolol and hydralzine resulting in return to acceptable parameters)  Respiratory status: acceptable and room air  Hydration status: stable  Comments: Patient is inpatient, returning to inpatient room.

## 2021-05-18 NOTE — PROGRESS NOTES
Discharge instructions reviewed with patient and wife at this time. IV access x2 removed. Instructions were reviewed in depth and any questions asked were answered. Strainer and graduate given to patient so he may strain his urine at home. Patient ambulatory to ER entrance, wife driving patient home. Discharged with documented belongings.

## 2021-05-18 NOTE — CONSULTS
Urology Consultation    Patient:  Sara Busby  MRN: [id number]  YOB: 1950    CHIEF COMPLAINT:  Left flank pain    HISTORY OF PRESENT ILLNESS:   The patient is a 70 y.o. male who presents with left flank pain. History  Patient was admitted to the hospital last night through the emergency department.  Patient did get a CT scan upon admittance to the emergency department.  This film was independently reviewed.  This does show a 5 mm stone in the proximal left ureter.  Patient was admitted to the floor. Africa Castellon did have an elevated white count of 19,000.  Patient was given IV antibiotics and fluids overnight.  Patient's laboratory values have improved.  His left flank pain has resolved.  Patient has never had stones in the past. fArica Castellon has never seen urology in the past.  Currently he is pain-free. Africa Castellon is concerned about not having a bowel movement.  I did review his KUB done this morning.  This did show heavy bowel contents and no stone per se.     Currently  Patient is being seen into her today after going to the emergency department. Patient has been admitted to the hospital and discharged. He did go to back to the hospital with worsening left flank pain. Patient did have a repeat CT scan performed. This was independently reviewed today. This did show that he has a 4 to 5 mm stone in the left proximal ureter that has not significantly changed in position. Patient's pain has significantly improved since being in the emergency department. He reports no gross hematuria or dysuria. He is having no nausea vomiting or pain. Patient's old records, notes and chart reviewed and summarized above.     Past Medical History:    Past Medical History:   Diagnosis Date    Arthritis     Hyperlipidemia     Kidney stone        Past Surgical History:    Past Surgical History:   Procedure Laterality Date    JOINT REPLACEMENT Left 07/12/2018    knee    RI TOTAL KNEE ARTHROPLASTY Left 7/12/2018    KNEE TOTAL ARTHROPLASTY performed by Malissa Oglesby MD at [address]     Previous  surgery: none     Medications:    Scheduled Meds:   ceFAZolin  2,000 mg Intravenous On Call to [address] [place] [address] Hold] sodium chloride flush  5-40 mL Intravenous 2 times per day    [facility] Hold] enoxaparin  40 mg Subcutaneous Daily    [MAR Hold] cefTRIAXone (ROCEPHIN) IV  1,000 mg Intravenous Daily     Continuous Infusions:   [MAR Hold] lactated ringers 125 mL/hr at 21 0530    [MAR Hold] sodium chloride       PRN Meds:. [MAR Hold] sodium chloride flush, [MAR Hold] sodium chloride, [MAR Hold] potassium chloride **OR** [MAR Hold] potassium alternative oral replacement **OR** [MAR Hold] potassium chloride, [MAR Hold] ondansetron **OR** [MAR Hold] ondansetron, [MAR Hold] morphine    Allergies:  Patient has no known allergies. Social History:    Social History     Socioeconomic History    Marital status:      Spouse name: Not on file    Number of children: Not on file    Years of education: Not on file    Highest education level: Not on file   Occupational History    Not on file   Tobacco Use    Smoking status: Former Smoker     Types: Cigarettes     Quit date: 2000     Years since quittin.3    Smokeless tobacco: Never Used   Substance and Sexual Activity    Alcohol use: No    Drug use: No    Sexual activity: Not on file   Other Topics Concern    Not on file   Social History Narrative    Not on file     Social Determinants of Health     Financial Resource Strain:     Difficulty of Paying Living Expenses:    Food Insecurity:     Worried About [address] in the Last Year:     [address] [facility] [address] in the Last Year:    Transportation Needs:     Lack of Transportation (Medical):      Lack of Transportation (Non-Medical):    Physical Activity:     Days of Exercise per Week:     Minutes of Exercise per Session:    Stress:     Feeling of Stress :    Social Connections:     Frequency of Communication with Friends and Family:     Frequency of Social Gatherings with Friends and Family:     Attends Yarsanism Services:     Active Member of Clubs or Organizations:     Attends Club or Organization Meetings:     Marital Status:    Intimate Partner Violence:     Fear of Current or Ex-Partner:     Emotionally Abused:     Physically Abused:     Sexually Abused:        Family History:  No family history on file. Previous Urologic Family history: none    REVIEW OF SYSTEMS:  Constitutional: negative  Eyes: negative  Respiratory: negative  Cardiovascular: negative  Gastrointestinal: negative  Genitourinary: see HPI  Musculoskeletal: negative  Skin: negative   Neurological: negative  Hematological/Lymphatic: negative  Psychological: negative    Physical Exam:    This a 70 y.o. male   Patient Vitals for the past 24 hrs:   BP Temp Temp src Pulse Resp SpO2 Height Weight   05/18/21 0950 -- -- -- -- -- -- 5' 8\" (1.727 m) --   05/18/21 0845 (!) 152/91 97.7 °F (36.5 °C) Temporal 67 18 97 % -- --   05/18/21 0345 -- -- -- -- -- -- -- 219 lb 9.3 oz (99.6 kg)   05/18/21 0105 139/80 97.4 °F (36.3 °C) Temporal 68 18 97 % -- --   05/17/21 2120 (!) 158/88 98.3 °F (36.8 °C) Oral 84 20 97 % 5' 8\" (1.727 m) 218 lb 7.6 oz (99.1 kg)   05/17/21 1910 (!) 162/92 -- -- -- -- 96 % -- --   05/17/21 1845 (!) 187/102 -- -- -- -- 94 % -- --   05/17/21 1834 (!) 166/104 98.7 °F (37.1 °C) Tympanic 86 22 97 % -- --     Constitutional: Patient in no acute distress; Neuro: alert and oriented to person place and time. Psych: Mood and affect normal.  Skin: Normal  Lungs: Respiratory effort normal  Cardiovascular:  Normal peripheral pulses  Abdomen: Soft, non-tender, non-distended with no CVA, flank pain, hepatosplenomegaly or hernia. Kidneys normal.  Bladder non-tender and not distended.   Lymphatics: no palpable lymphadenopathy  Penis normal and circumcised  Urethral meatus normal  Scrotal exam normal  Testicles normal bilaterally  Epididymis normal bilaterally  No evidence of inguinal hernia  Anus and perineum normal  Normal rectal tone with no masses  Prostate soft, non-tender to palpation. No palpable nodules. Estimated 40 grams.    LABS:  Recent Labs     05/16/21  0550 05/17/21  1851 05/18/21  0555   WBC 12.8* 12.5* 12.7*   HGB 11.2* 11.9* 10.7*   HCT 34.6* 36.4* 32.7*   MCV 98.9 98.4 98.5    291 262     Recent Labs     05/15/21  1710 05/16/21  0550 05/17/21  1851    141 137   K 4.6 4.7 4.1    108* 101   CO2 25 24 26   BUN 18 14 17   CREATININE 1.78* 1.64* 1.69*     No results found for: PSA    Additional Lab/culture results:    Urinalysis:   Recent Labs     05/17/21 2027   COLORU YELLOW   PHUR 6.0   WBCUA 0 TO 2   RBCUA 0 TO 2   MUCUS 1+*   TRICHOMONAS NOT REPORTED   YEAST NOT REPORTED   BACTERIA TRACE*   SPECGRAV >1.030*   LEUKOCYTESUR NEGATIVE   UROBILINOGEN Normal   BILIRUBINUR NEGATIVE        -----------------------------------------------------------------  Imaging Results:    Assessment and Plan   Impression:    Patient Active Problem List   Diagnosis    Primary osteoarthritis of left knee    Hydronephrosis with urinary obstruction due to ureteral calculus    Urinary tract obstruction due to kidney stone    Essential hypertension       Plan: Left HLL today    Melisa Smith MD  11:14 AM 5/18/2021

## 2021-05-18 NOTE — ANESTHESIA PRE PROCEDURE
Department of Anesthesiology  Preprocedure Note       Name:  Annette Grajeda   Age:  70 y.o.  :  1950                                          MRN:  [id number]         Date:  2021      Surgeon: Dallin Ferrari):  Hilda Colón MD    Procedure: Procedure(s):  CYSTOSCOPY URETEROSCOPY LASER-HLL  CYSTOSCOPY   STENT INSERTION    Medications prior to admission:   Prior to Admission medications    Medication Sig Start Date End Date Taking?  Authorizing Provider   ciprofloxacin (CIPRO) 500 MG tablet Take 1 tablet by mouth 2 times daily for 10 days 21 Yes Arline Jose MD   ondansetron (ZOFRAN ODT) 4 MG disintegrating tablet Take 1 tablet by mouth every 8 hours as needed for Nausea or Vomiting 21 Yes SHEYLA Kulkarni CNP   tamsulosin [facility]) 0.4 MG capsule Take 1 capsule by mouth daily 21  Yes SHEYLA Kulkarni CNP       Current medications:    Current Facility-Administered Medications   Medication Dose Route Frequency Provider Last Rate Last Admin    ceFAZolin (ANCEF) 2000 mg in dextrose 3 % 50 mL IVPB (duplex)  2,000 mg Intravenous On Call to Sushma Sears MD        [facility] Hold] lactated ringers infusion   Intravenous Continuous Daniela Thibodeaux  mL/hr at 21 0530 New Bag at 21 0530    [MAR Hold] sodium chloride flush 0.9 % injection 5-40 mL  5-40 mL Intravenous 2 times per day Daniela Thibodeaux MD   10 mL at 21 0847    [MAR Hold] sodium chloride flush 0.9 % injection 5-40 mL  5-40 mL Intravenous PRN Daniela Thibodeaux MD        [facility] Hold] 0.9 % sodium chloride infusion  25 mL Intravenous PRN Daniela Thibodeaux MD        [facility] Hold] potassium chloride (KLOR-CON M) extended release tablet 40 mEq  40 mEq Oral PRN Daniela Thibodeaux MD        Or   [facility] Hold] potassium bicarb-citric acid (EFFER-K) effervescent tablet 40 mEq  40 mEq Oral PRN Daniela Thibodeaux MD        Or   [facility] Hold] potassium chloride 10 mEq/100 mL IVPB (Peripheral Line)  10 mEq Intravenous PRN Alma Mobley MD        [MAR Hold] ondansetron (ZOFRAN-ODT) disintegrating tablet 4 mg  4 mg Oral Q8H PRN Alma Mobley MD        Or   [facility] Hold] ondansetron (ZOFRAN) injection 4 mg  4 mg Intravenous Q6H PRN Alma Mobley MD        [MAR Hold] enoxaparin (LOVENOX) injection 40 mg  40 mg Subcutaneous Daily Alma Mobley MD        [facility] Hold] cefTRIAXone (ROCEPHIN) 1,000 mg in sterile water 10 mL IV syringe  1,000 mg Intravenous Daily Alma Mobley MD   1,000 mg at 21 0847    [MAR Hold] morphine (PF) injection 2 mg  2 mg Intravenous Q2H PRN Alma Mobley MD           Allergies:  No Known Allergies    Problem List:    Patient Active Problem List   Diagnosis Code    Primary osteoarthritis of left knee M17.12    Hydronephrosis with urinary obstruction due to ureteral calculus N13.2    Urinary tract obstruction due to kidney stone N20.0, N13.8    Essential hypertension I10       Past Medical History:        Diagnosis Date    Arthritis     Hyperlipidemia     Kidney stone        Past Surgical History:        Procedure Laterality Date    JOINT REPLACEMENT Left 2018    knee    LA TOTAL KNEE ARTHROPLASTY Left 2018    KNEE TOTAL ARTHROPLASTY performed by Shane Dubon MD at [place] [address] History:    Social History     Tobacco Use    Smoking status: Former Smoker     Types: Cigarettes     Quit date: 2000     Years since quittin.3    Smokeless tobacco: Never Used   Substance Use Topics    Alcohol use:  No                                Counseling given: Not Answered      Vital Signs (Current):   Vitals:    21 0105 21 0345 21 0845 21 0950   BP: 139/80  (!) 152/91    Pulse: 68  67    Resp: 18  18    Temp: 36.3 °C (97.4 °F)  36.5 °C (97.7 °F)    TempSrc: Temporal  Temporal    SpO2: 97%  97%    Weight:  219 lb 9.3 oz (99.6 kg)     Height:    5' 8\" (1.727 m) BP Readings from Last 3 Encounters:   05/18/21 (!) 152/91   05/17/21 132/82   05/16/21 136/75       NPO Status: Time of last liquid consumption: 1930                        Time of last solid consumption: 1930                        Date of last liquid consumption: 05/17/21                        Date of last solid food consumption: 05/17/21    BMI:   Wt Readings from Last 3 Encounters:   05/18/21 219 lb 9.3 oz (99.6 kg)   05/17/21 216 lb (98 kg)   05/16/21 215 lb 11.2 oz (97.8 kg)     Body mass index is 33.39 kg/m². CBC:   Lab Results   Component Value Date    WBC 12.7 05/18/2021    RBC 3.32 05/18/2021    HGB 10.7 05/18/2021    HCT 32.7 05/18/2021    MCV 98.5 05/18/2021    RDW 14.6 05/18/2021     05/18/2021       CMP:   Lab Results   Component Value Date     05/17/2021    K 4.1 05/17/2021     05/17/2021    CO2 26 05/17/2021    BUN 17 05/17/2021    CREATININE 1.69 05/17/2021    GFRAA 49 05/17/2021    LABGLOM 40 05/17/2021    GLUCOSE 138 05/17/2021    GLUCOSE 101 11/03/2011    PROT 6.5 05/17/2021    CALCIUM 9.3 05/17/2021    BILITOT 0.66 05/17/2021    ALKPHOS 85 05/17/2021    AST 25 05/17/2021    ALT 35 05/17/2021       POC Tests: No results for input(s): POCGLU, POCNA, POCK, POCCL, POCBUN, POCHEMO, POCHCT in the last 72 hours.     Coags: No results found for: PROTIME, INR, APTT    HCG (If Applicable): No results found for: PREGTESTUR, PREGSERUM, HCG, HCGQUANT     ABGs: No results found for: PHART, PO2ART, JSH5QDD, GFE0OXC, BEART, [id number]     Type & Screen (If Applicable):  No results found for: LABABO, LABRH    Drug/Infectious Status (If Applicable):  No results found for: HIV, HEPCAB    COVID-19 Screening (If Applicable): No results found for: COVID19        Anesthesia Evaluation  Patient summary reviewed and Nursing notes reviewed no history of anesthetic complications:   Airway: Mallampati: I  TM distance: >3 FB   Neck ROM: full  Mouth opening: > = 3 FB Dental: Pulmonary:Negative Pulmonary ROS and normal exam  breath sounds clear to auscultation                             Cardiovascular:    (+) hyperlipidemia    (-) hypertension    ECG reviewed  Rhythm: regular  Rate: normal                 ROS comment: EKG WNL     Neuro/Psych:   Negative Neuro/Psych ROS              GI/Hepatic/Renal:   (+) renal disease: kidney stones,          ROS comment: elevated Cr 1.69 yesterday. Endo/Other:    (+) : arthritis: OA., .                 Abdominal:           Vascular: negative vascular ROS. Anesthesia Plan      general     ASA 2       Induction: intravenous. Anesthetic plan and risks discussed with patient.                       Charles Najera, APRN - CRNA   5/18/2021

## 2021-05-18 NOTE — PROGRESS NOTES
Patient unavailable for discharge planning assessment at this time. Will try again at a later time.     [address]  5/18/2021

## 2021-05-18 NOTE — PLAN OF CARE
Problem: Urinary Elimination:  Goal: Ability to achieve a balanced intake and output will improve  Description: Ability to achieve a balanced intake and output will improve  Outcome: Ongoing  Note: I&O monitored. IVF infusing. Problem: Pain:  Goal: Control of acute pain  Description: Control of acute pain  Outcome: Ongoing  Note: Pt has denied pain thus far since admission.

## 2021-05-19 RX ORDER — HYDROCODONE BITARTRATE AND ACETAMINOPHEN 5; 325 MG/1; MG/1
1 TABLET ORAL EVERY 6 HOURS PRN
Qty: 10 TABLET | Refills: 0 | Status: SHIPPED | OUTPATIENT
Start: 2021-05-19 | End: 2021-05-22

## 2021-06-10 ENCOUNTER — TELEPHONE (OUTPATIENT)
Dept: UROLOGY | Age: 71
End: 2021-06-10

## 2021-06-10 NOTE — TELEPHONE ENCOUNTER
Patient just returned home from [place]. Patient does have stent placed and scheduled for HLL on 6/15/21. Patient states he was out doing yard work. Patient stated he urinated and noticed blood in urine. No pain. No fever or chills. Feels fine.

## 2021-06-10 NOTE — TELEPHONE ENCOUNTER
Please let him know that he still needs surgery. Generally speaking stones do not pass while the stent is in place. The stent was placed to temporize him as he was symptomatic.   We will proceed on surgery as planned

## 2021-06-10 NOTE — TELEPHONE ENCOUNTER
Patient is wondering if Xray or something will be done prior to surgery to see if he still has stone. He states he has no pain.

## 2021-06-14 ENCOUNTER — ANESTHESIA EVENT (OUTPATIENT)
Dept: OPERATING ROOM | Age: 71
End: 2021-06-14
Payer: MEDICARE

## 2021-06-15 ENCOUNTER — APPOINTMENT (OUTPATIENT)
Dept: GENERAL RADIOLOGY | Age: 71
End: 2021-06-15
Attending: UROLOGY
Payer: MEDICARE

## 2021-06-15 ENCOUNTER — ANESTHESIA (OUTPATIENT)
Dept: OPERATING ROOM | Age: 71
End: 2021-06-15
Payer: MEDICARE

## 2021-06-15 ENCOUNTER — HOSPITAL ENCOUNTER (OUTPATIENT)
Age: 71
Setting detail: OUTPATIENT SURGERY
Discharge: HOME | End: 2021-06-15
Attending: UROLOGY | Admitting: UROLOGY
Payer: MEDICARE

## 2021-06-15 VITALS — OXYGEN SATURATION: 99 % | DIASTOLIC BLOOD PRESSURE: 66 MMHG | SYSTOLIC BLOOD PRESSURE: 107 MMHG

## 2021-06-15 VITALS
SYSTOLIC BLOOD PRESSURE: 139 MMHG | WEIGHT: 210.8 LBS | RESPIRATION RATE: 16 BRPM | TEMPERATURE: 97.1 F | DIASTOLIC BLOOD PRESSURE: 63 MMHG | BODY MASS INDEX: 31.95 KG/M2 | OXYGEN SATURATION: 97 % | HEIGHT: 68 IN | HEART RATE: 65 BPM

## 2021-06-15 PROCEDURE — 7100000010 HC PHASE II RECOVERY - FIRST 15 MIN: Performed by: UROLOGY

## 2021-06-15 PROCEDURE — C1769 GUIDE WIRE: HCPCS | Performed by: UROLOGY

## 2021-06-15 PROCEDURE — 3700000001 HC ADD 15 MINUTES (ANESTHESIA): Performed by: UROLOGY

## 2021-06-15 PROCEDURE — C1758 CATHETER, URETERAL: HCPCS | Performed by: UROLOGY

## 2021-06-15 PROCEDURE — 2580000003 HC RX 258: Performed by: UROLOGY

## 2021-06-15 PROCEDURE — 6360000002 HC RX W HCPCS: Performed by: NURSE ANESTHETIST, CERTIFIED REGISTERED

## 2021-06-15 PROCEDURE — C2617 STENT, NON-COR, TEM W/O DEL: HCPCS | Performed by: UROLOGY

## 2021-06-15 PROCEDURE — 3600000004 HC SURGERY LEVEL 4 BASE: Performed by: UROLOGY

## 2021-06-15 PROCEDURE — 2709999900 HC NON-CHARGEABLE SUPPLY: Performed by: UROLOGY

## 2021-06-15 PROCEDURE — 3600000014 HC SURGERY LEVEL 4 ADDTL 15MIN: Performed by: UROLOGY

## 2021-06-15 PROCEDURE — 6370000000 HC RX 637 (ALT 250 FOR IP): Performed by: UROLOGY

## 2021-06-15 PROCEDURE — 3209999900 FLUORO FOR SURGICAL PROCEDURES

## 2021-06-15 PROCEDURE — 7100000001 HC PACU RECOVERY - ADDTL 15 MIN: Performed by: UROLOGY

## 2021-06-15 PROCEDURE — 7100000011 HC PHASE II RECOVERY - ADDTL 15 MIN: Performed by: UROLOGY

## 2021-06-15 PROCEDURE — 7100000000 HC PACU RECOVERY - FIRST 15 MIN: Performed by: UROLOGY

## 2021-06-15 PROCEDURE — 3700000000 HC ANESTHESIA ATTENDED CARE: Performed by: UROLOGY

## 2021-06-15 PROCEDURE — 2720000010 HC SURG SUPPLY STERILE: Performed by: UROLOGY

## 2021-06-15 PROCEDURE — 2500000003 HC RX 250 WO HCPCS: Performed by: NURSE ANESTHETIST, CERTIFIED REGISTERED

## 2021-06-15 PROCEDURE — 6360000002 HC RX W HCPCS: Performed by: UROLOGY

## 2021-06-15 DEVICE — URETERAL STENT
Type: IMPLANTABLE DEVICE | Site: URETER | Status: FUNCTIONAL
Brand: PERCUFLEX™ PLUS

## 2021-06-15 RX ORDER — HYDROCODONE BITARTRATE AND ACETAMINOPHEN 5; 325 MG/1; MG/1
2 TABLET ORAL PRN
Status: DISCONTINUED | OUTPATIENT
Start: 2021-06-15 | End: 2021-06-15 | Stop reason: HOSPADM

## 2021-06-15 RX ORDER — SODIUM CHLORIDE 0.9 % (FLUSH) 0.9 %
5-40 SYRINGE (ML) INJECTION PRN
Status: DISCONTINUED | OUTPATIENT
Start: 2021-06-15 | End: 2021-06-15 | Stop reason: HOSPADM

## 2021-06-15 RX ORDER — SODIUM CHLORIDE 9 MG/ML
25 INJECTION, SOLUTION INTRAVENOUS PRN
Status: DISCONTINUED | OUTPATIENT
Start: 2021-06-15 | End: 2021-06-15 | Stop reason: HOSPADM

## 2021-06-15 RX ORDER — PROPOFOL 10 MG/ML
INJECTION, EMULSION INTRAVENOUS PRN
Status: DISCONTINUED | OUTPATIENT
Start: 2021-06-15 | End: 2021-06-15 | Stop reason: SDUPTHER

## 2021-06-15 RX ORDER — CIPROFLOXACIN 2 MG/ML
400 INJECTION, SOLUTION INTRAVENOUS
Status: COMPLETED | OUTPATIENT
Start: 2021-06-15 | End: 2021-06-15

## 2021-06-15 RX ORDER — HYDROCODONE BITARTRATE AND ACETAMINOPHEN 5; 325 MG/1; MG/1
1 TABLET ORAL PRN
Status: DISCONTINUED | OUTPATIENT
Start: 2021-06-15 | End: 2021-06-15 | Stop reason: HOSPADM

## 2021-06-15 RX ORDER — FENTANYL CITRATE 50 UG/ML
50 INJECTION, SOLUTION INTRAMUSCULAR; INTRAVENOUS EVERY 5 MIN PRN
Status: DISCONTINUED | OUTPATIENT
Start: 2021-06-15 | End: 2021-06-15 | Stop reason: HOSPADM

## 2021-06-15 RX ORDER — METOCLOPRAMIDE HYDROCHLORIDE 5 MG/ML
10 INJECTION INTRAMUSCULAR; INTRAVENOUS
Status: DISCONTINUED | OUTPATIENT
Start: 2021-06-15 | End: 2021-06-15 | Stop reason: HOSPADM

## 2021-06-15 RX ORDER — FENTANYL CITRATE 50 UG/ML
25 INJECTION, SOLUTION INTRAMUSCULAR; INTRAVENOUS EVERY 5 MIN PRN
Status: DISCONTINUED | OUTPATIENT
Start: 2021-06-15 | End: 2021-06-15 | Stop reason: HOSPADM

## 2021-06-15 RX ORDER — LIDOCAINE HYDROCHLORIDE 20 MG/ML
JELLY TOPICAL PRN
Status: DISCONTINUED | OUTPATIENT
Start: 2021-06-15 | End: 2021-06-15 | Stop reason: ALTCHOICE

## 2021-06-15 RX ORDER — LIDOCAINE HYDROCHLORIDE 20 MG/ML
INJECTION, SOLUTION EPIDURAL; INFILTRATION; INTRACAUDAL; PERINEURAL PRN
Status: DISCONTINUED | OUTPATIENT
Start: 2021-06-15 | End: 2021-06-15 | Stop reason: SDUPTHER

## 2021-06-15 RX ORDER — FENTANYL CITRATE 50 UG/ML
INJECTION, SOLUTION INTRAMUSCULAR; INTRAVENOUS PRN
Status: DISCONTINUED | OUTPATIENT
Start: 2021-06-15 | End: 2021-06-15 | Stop reason: SDUPTHER

## 2021-06-15 RX ORDER — DEXAMETHASONE SODIUM PHOSPHATE 4 MG/ML
INJECTION, SOLUTION INTRA-ARTICULAR; INTRALESIONAL; INTRAMUSCULAR; INTRAVENOUS; SOFT TISSUE PRN
Status: DISCONTINUED | OUTPATIENT
Start: 2021-06-15 | End: 2021-06-15 | Stop reason: SDUPTHER

## 2021-06-15 RX ORDER — ONDANSETRON 2 MG/ML
4 INJECTION INTRAMUSCULAR; INTRAVENOUS
Status: DISCONTINUED | OUTPATIENT
Start: 2021-06-15 | End: 2021-06-15 | Stop reason: HOSPADM

## 2021-06-15 RX ORDER — SODIUM CHLORIDE, SODIUM LACTATE, POTASSIUM CHLORIDE, CALCIUM CHLORIDE 600; 310; 30; 20 MG/100ML; MG/100ML; MG/100ML; MG/100ML
INJECTION, SOLUTION INTRAVENOUS CONTINUOUS
Status: DISCONTINUED | OUTPATIENT
Start: 2021-06-15 | End: 2021-06-15 | Stop reason: HOSPADM

## 2021-06-15 RX ORDER — SODIUM CHLORIDE 0.9 % (FLUSH) 0.9 %
5-40 SYRINGE (ML) INJECTION EVERY 12 HOURS SCHEDULED
Status: DISCONTINUED | OUTPATIENT
Start: 2021-06-15 | End: 2021-06-15 | Stop reason: HOSPADM

## 2021-06-15 RX ADMIN — SODIUM CHLORIDE, POTASSIUM CHLORIDE, SODIUM LACTATE AND CALCIUM CHLORIDE: 600; 310; 30; 20 INJECTION, SOLUTION INTRAVENOUS at 08:24

## 2021-06-15 RX ADMIN — CIPROFLOXACIN 400 MG: 2 INJECTION, SOLUTION INTRAVENOUS at 10:28

## 2021-06-15 RX ADMIN — PROPOFOL 200 MG: 10 INJECTION, EMULSION INTRAVENOUS at 10:33

## 2021-06-15 RX ADMIN — LIDOCAINE HYDROCHLORIDE 100 MG: 20 INJECTION, SOLUTION EPIDURAL; INFILTRATION; INTRACAUDAL; PERINEURAL at 10:33

## 2021-06-15 RX ADMIN — FENTANYL CITRATE 50 MCG: 50 INJECTION, SOLUTION INTRAMUSCULAR; INTRAVENOUS at 10:43

## 2021-06-15 RX ADMIN — DEXAMETHASONE SODIUM PHOSPHATE 4 MG: 4 INJECTION, SOLUTION INTRAMUSCULAR; INTRAVENOUS at 10:33

## 2021-06-15 RX ADMIN — FENTANYL CITRATE 50 MCG: 50 INJECTION, SOLUTION INTRAMUSCULAR; INTRAVENOUS at 10:33

## 2021-06-15 ASSESSMENT — PAIN SCALES - GENERAL
PAINLEVEL_OUTOF10: 0

## 2021-06-15 NOTE — PROGRESS NOTES
Brought pt to phase 2, when pt stood he determined he needed to void, standby assist to bathroom, pt voided without difficulty, pt states it was red (but not as dark as previous time) and did have some burning with urination. Pt noted that he had a small amount of bloody drainage from penis so he wiped gently but noticed one of the steri strips was loose. Will speak to Dr. Jeanie Mccain shortly.

## 2021-06-15 NOTE — ANESTHESIA PRE PROCEDURE
Department of Anesthesiology  Preprocedure Note       Name:  Fidel Jaime   Age:  70 y.o.  :  1950                                          MRN:  [id number]         Date:  6/15/2021      Surgeon: Jessica Hendrix):  Starr Love MD    Procedure: Procedure(s):  CYSTOSCOPY URETEROSCOPY LASER, HLL  CYSTOSCOPY STENT INSERTION/EXCHANGE    Medications prior to admission:   Prior to Admission medications    Medication Sig Start Date End Date Taking? Authorizing Provider   tamsulosin (FLOMAX) 0.4 MG capsule Take 1 capsule by mouth daily 21  Yes Shabana Kelly APRN - CNP       Current medications:    No current facility-administered medications for this encounter.      Current Outpatient Medications   Medication Sig Dispense Refill    tamsulosin (FLOMAX) 0.4 MG capsule Take 1 capsule by mouth daily 30 capsule 0       Allergies:  No Known Allergies    Problem List:    Patient Active Problem List   Diagnosis Code    Primary osteoarthritis of left knee M17.12    Hydronephrosis with urinary obstruction due to ureteral calculus N13.2    Urinary tract obstruction due to kidney stone N20.0, N13.8    Essential hypertension I10       Past Medical History:        Diagnosis Date    Arthritis     Hyperlipidemia     Kidney stone        Past Surgical History:        Procedure Laterality Date    BLADDER SURGERY Left 2021    CYSTOSCOPY   STENT INSERTION performed by Starr Love MD at [address] [place] [address] Left 2021    CYSTOSCOPY URETEROSCOPY performed by Starr Love MD at [address] [place] [address] Left 06/15/2021    Dr. Trenton Lockhart, JOSELUIS stent insertion    JOINT REPLACEMENT Left 2018    knee    SC TOTAL KNEE ARTHROPLASTY Left 2018    KNEE TOTAL ARTHROPLASTY performed by Kelly Gunter MD at [address] [place] [address] History:    Social History     Tobacco Use    Smoking status: Former Smoker     Types: Cigarettes     Quit date:      Years since quittin.4    Smokeless tobacco: Never Used   Substance Use Topics    Alcohol use: No                                Counseling given: Not Answered      Vital Signs (Current):   Vitals:    06/15/21 1145 06/15/21 1200 06/15/21 1215 06/15/21 1230   BP: (!) 141/80 (!) 166/71 (!) 147/67 139/63   Pulse: 64 61 65 65   Resp: 16 18 16 16   Temp:  36.2 °C (97.1 °F)     TempSrc:  Temporal     SpO2: 95% 96% 96% 97%   Weight:       Height:                                                  BP Readings from Last 3 Encounters:   06/15/21 139/63   05/18/21 (!) 159/91   05/18/21 (!) 141/69       NPO Status: Time of last liquid consumption: 2000                        Time of last solid consumption: 2000                        Date of last liquid consumption: 06/14/21                        Date of last solid food consumption: 06/14/21    BMI:   Wt Readings from Last 3 Encounters:   06/15/21 210 lb 12.8 oz (95.6 kg)   05/18/21 219 lb 9.3 oz (99.6 kg)   05/17/21 216 lb (98 kg)     Body mass index is 32.05 kg/m². CBC:   Lab Results   Component Value Date    WBC 12.7 05/18/2021    RBC 3.32 05/18/2021    HGB 10.7 05/18/2021    HCT 32.7 05/18/2021    MCV 98.5 05/18/2021    RDW 14.6 05/18/2021     05/18/2021       CMP:   Lab Results   Component Value Date     05/17/2021    K 4.1 05/17/2021     05/17/2021    CO2 26 05/17/2021    BUN 17 05/17/2021    CREATININE 1.69 05/17/2021    GFRAA 49 05/17/2021    LABGLOM 40 05/17/2021    GLUCOSE 138 05/17/2021    GLUCOSE 101 11/03/2011    PROT 6.5 05/17/2021    CALCIUM 9.3 05/17/2021    BILITOT 0.66 05/17/2021    ALKPHOS 85 05/17/2021    AST 25 05/17/2021    ALT 35 05/17/2021       POC Tests: No results for input(s): POCGLU, POCNA, POCK, POCCL, POCBUN, POCHEMO, POCHCT in the last 72 hours.     Coags: No results found for: PROTIME, INR, APTT    HCG (If Applicable): No results found for: PREGTESTUR, PREGSERUM, HCG, HCGQUANT     ABGs: No results found for: PHART, PO2ART, GIE2IYK, BWD1OJP, BEART, K8IGEANA     Type & Screen (If Applicable):  No results found for: LABABO, [address]    Drug/Infectious Status (If Applicable):  No results found for: HIV, HEPCAB    COVID-19 Screening (If Applicable): No results found for: [address] [facility]        Anesthesia Evaluation     Anesthesia Plan      general                                   SHEYLA Graham CRNA   6/15/2021

## 2021-06-15 NOTE — ANESTHESIA POSTPROCEDURE EVALUATION
Department of Anesthesiology  Postprocedure Note    Patient: Love Osorio  MRN: [id number]  YOB: 1950  Date of evaluation: 6/15/2021  Time:  4:57 PM     Procedure Summary     Date: 06/15/21 Room / Location: [facility]    Anesthesia Start: 5594 Anesthesia Stop: 1108    Procedures:       CYSTOSCOPY URETEROSCOPY LASER, HLL (Left )      CYSTOSCOPY STENT INSERTION/EXCHANGE (Left ) Diagnosis: (LEFT URETERAL CALCULUS)    Surgeons: Narinder Magaña MD Responsible Provider: SHEYLA Brennan - CRNA    Anesthesia Type: Not recorded ASA Status: 2          Anesthesia Type: No value filed. Luisa Phase I: Luisa Score: 10    Luisa Phase II: Luisa Score: 10    Last vitals: Reviewed and per EMR flowsheets.        Anesthesia Post Evaluation    Patient location during evaluation: PACU  Patient participation: complete - patient participated  Level of consciousness: awake and alert  Airway patency: patent  Nausea & Vomiting: no nausea and no vomiting  Complications: no  Cardiovascular status: blood pressure returned to baseline and hemodynamically stable  Respiratory status: acceptable and room air  Hydration status: euvolemic

## 2021-06-15 NOTE — ANESTHESIA PRE PROCEDURE
Department of Anesthesiology  Preprocedure Note       Name:  Maxwell Tenorio   Age:  70 y.o.  :  1950                                          MRN:  [id number]         Date:  6/15/2021      Surgeon: Dany Brandt):  Jose Gómez MD    Procedure: Procedure(s):  CYSTOSCOPY URETEROSCOPY LASER, HLL  CYSTOSCOPY STENT INSERTION/EXCHANGE    Medications prior to admission:   Prior to Admission medications    Medication Sig Start Date End Date Taking?  Authorizing Provider   tamsulosin (FLOMAX) 0.4 MG capsule Take 1 capsule by mouth daily 21  Yes Ekta Estrin, APRN - CNP       Current medications:    Current Facility-Administered Medications   Medication Dose Route Frequency Provider Last Rate Last Admin    lactated ringers infusion   Intravenous Continuous Jose Gómez  mL/hr at 06/15/21 0824 New Bag at 06/15/21 0824    sodium chloride flush 0.9 % injection 5-40 mL  5-40 mL Intravenous 2 times per day Jose Gómez MD        sodium chloride flush 0.9 % injection 5-40 mL  5-40 mL Intravenous PRN Jose Gómez MD        0.9 % sodium chloride infusion  25 mL Intravenous PRN Jose Gómez MD        ciprofloxacin (CIPRO) IVPB 400 mg  400 mg Intravenous On Call to Leighton Daly MD           Allergies:  No Known Allergies    Problem List:    Patient Active Problem List   Diagnosis Code    Primary osteoarthritis of left knee M17.12    Hydronephrosis with urinary obstruction due to ureteral calculus N13.2    Urinary tract obstruction due to kidney stone N20.0, N13.8    Essential hypertension I10       Past Medical History:        Diagnosis Date    Arthritis     Hyperlipidemia     Kidney stone        Past Surgical History:        Procedure Laterality Date    BLADDER SURGERY Left 2021    CYSTOSCOPY   STENT INSERTION performed by Jose G[facility]z MD at [facility] Left 2021    CYSTOSCOPY URETEROSCOPY performed by Jose Gómez MD at [address] [place] [address] JOINT REPLACEMENT Left 2018    knee    [place] TOTAL KNEE ARTHROPLASTY Left 2018    KNEE TOTAL ARTHROPLASTY performed by Edy Roland MD at [address] [place] [address] History:    Social History     Tobacco Use    Smoking status: Former Smoker     Types: Cigarettes     Quit date:      Years since quittin.4    Smokeless tobacco: Never Used   Substance Use Topics    Alcohol use: No                                Counseling given: Not Answered      Vital Signs (Current):   Vitals:    21 1200 06/15/21 0802   BP:  (!) 167/79   Pulse:  81   Resp:  19   Temp:  36.3 °C (97.4 °F)   TempSrc:  Temporal   SpO2:  96%   Weight: 219 lb (99.3 kg) 210 lb 12.8 oz (95.6 kg)   Height: 5' 8\" (1.727 m) 5' 8\" (1.727 m)                                              BP Readings from Last 3 Encounters:   06/15/21 (!) 167/79   21 (!) 159/91   21 (!) 141/69       NPO Status: Time of last liquid consumption:                         Time of last solid consumption:                         Date of last liquid consumption: 21                        Date of last solid food consumption: 21    BMI:   Wt Readings from Last 3 Encounters:   06/15/21 210 lb 12.8 oz (95.6 kg)   21 219 lb 9.3 oz (99.6 kg)   21 216 lb (98 kg)     Body mass index is 32.05 kg/m².     CBC:   Lab Results   Component Value Date    WBC 12.7 2021    RBC 3.32 2021    HGB 10.7 2021    HCT 32.7 2021    MCV 98.5 2021    RDW 14.6 2021     2021       CMP:   Lab Results   Component Value Date     2021    K 4.1 2021     2021    CO2 26 2021    BUN 17 2021    CREATININE 1.69 2021    GFRAA 49 2021    LABGLOM 40 2021    GLUCOSE 138 2021    GLUCOSE 101 2011    PROT 6.5 2021    CALCIUM 9.3 2021    BILITOT 0.66 2021    ALKPHOS 85 2021    AST 25 2021    ALT 35 2021       POC Tests: No results for input(s): POCGLU, POCNA, POCK, POCCL, POCBUN, POCHEMO, POCHCT in the last 72 hours. Coags: No results found for: PROTIME, INR, APTT    HCG (If Applicable): No results found for: PREGTESTUR, PREGSERUM, HCG, HCGQUANT     ABGs: No results found for: PHART, PO2ART, QEQ3GUL, KJE9WXB, BEART, A7HKTCOP     Type & Screen (If Applicable):  No results found for: LABABO, LABRH    Drug/Infectious Status (If Applicable):  No results found for: HIV, HEPCAB    COVID-19 Screening (If Applicable): No results found for: COVID19        Anesthesia Evaluation    Airway: Mallampati: II  TM distance: >3 FB   Neck ROM: full  Mouth opening: > = 3 FB Dental:      Comment: Large chip left central incisor    Pulmonary:Negative Pulmonary ROS breath sounds clear to auscultation                             Cardiovascular:Negative CV ROS    (+) hypertension:,         Rhythm: regular  Rate: normal                 ROS comment: Monitoring BP not currently treated     Neuro/Psych:   Negative Neuro/Psych ROS              GI/Hepatic/Renal:   (+) renal disease: kidney stones,          ROS comment: Left sided stone. Endo/Other: Negative Endo/Other ROS                    Abdominal:           Vascular:                                        Anesthesia Plan      ASA 2       Induction: intravenous. Anesthetic plan and risks discussed with patient.       Plan discussed with surgical team.                  SHEYLA Fermin CRNA   6/15/2021

## 2021-06-15 NOTE — PROGRESS NOTES
Pt requested to use restroom again prior to discharge, ambulated without difficulty, voided, again states bloody, but not concerning. Awaiting his wife to come here to pick pt up.

## 2021-06-15 NOTE — PROGRESS NOTES
Does not appear that stent is pulled out much, if at all, reapplied mastisol and new steri strips. Instructed pt to wipe gently but if stent gets accidentally pulled out, he should call Dr. Strauss Payment office.

## 2021-06-15 NOTE — OP NOTE
and identified  the stone in the proximal ureter. We were able to ablate the stone  adequately using a 200-micron laser fiber. Once no more persistent  stones were there about 2 mm, we then removed the scope leaving the  Glidewire in place, placed the cystoscope over the Glidewire, placed a  6-Pashto x 26 cm JJ ureteral stent over the Glidewire up into the  kidney. Glidewire was removed. Proximal curl was confirmed by  fluoroscopy. Distal curl was confirmed by visualization. At this point  in time, stent string was attached to the glans penis with Steris and  benzoin. He was then awoken from general anesthesia, transferred to the  [facility], and taken to the PACU in satisfactory condition by Nursing and  Anesthesia teams. PLAN:  The patient will be discharged home per PACU criteria and follow  up with us in two to three days for stent removal via string.         Polo Almaguer    D: 06/15/2021 12:44:53       T: 06/15/2021 13:30:54     [email or web address]  Job#: [id number]     Doc#: [id number]    CC:

## 2021-06-17 ENCOUNTER — OFFICE VISIT (OUTPATIENT)
Dept: UROLOGY | Age: 71
End: 2021-06-17
Payer: MEDICARE

## 2021-06-17 VITALS
DIASTOLIC BLOOD PRESSURE: 78 MMHG | SYSTOLIC BLOOD PRESSURE: 138 MMHG | WEIGHT: 209 LBS | TEMPERATURE: 98.2 F | BODY MASS INDEX: 31.78 KG/M2

## 2021-06-17 DIAGNOSIS — N20.1 URETERAL CALCULUS: Primary | ICD-10-CM

## 2021-06-17 PROCEDURE — 1036F TOBACCO NON-USER: CPT | Performed by: NURSE PRACTITIONER

## 2021-06-17 PROCEDURE — 99213 OFFICE O/P EST LOW 20 MIN: CPT | Performed by: NURSE PRACTITIONER

## 2021-06-17 PROCEDURE — 3017F COLORECTAL CA SCREEN DOC REV: CPT | Performed by: NURSE PRACTITIONER

## 2021-06-17 PROCEDURE — 99211 OFF/OP EST MAY X REQ PHY/QHP: CPT

## 2021-06-17 PROCEDURE — 1111F DSCHRG MED/CURRENT MED MERGE: CPT | Performed by: NURSE PRACTITIONER

## 2021-06-17 PROCEDURE — G8417 CALC BMI ABV UP PARAM F/U: HCPCS | Performed by: NURSE PRACTITIONER

## 2021-06-17 PROCEDURE — 4040F PNEUMOC VAC/ADMIN/RCVD: CPT | Performed by: NURSE PRACTITIONER

## 2021-06-17 PROCEDURE — G8427 DOCREV CUR MEDS BY ELIG CLIN: HCPCS | Performed by: NURSE PRACTITIONER

## 2021-06-17 PROCEDURE — 1123F ACP DISCUSS/DSCN MKR DOCD: CPT | Performed by: NURSE PRACTITIONER

## 2021-06-17 ASSESSMENT — ENCOUNTER SYMPTOMS
COLOR CHANGE: 0
CONSTIPATION: 0
NAUSEA: 0
COUGH: 0
ABDOMINAL PAIN: 0
SHORTNESS OF BREATH: 0
WHEEZING: 0
BACK PAIN: 0
VOMITING: 0
EYE REDNESS: 0

## 2021-06-17 NOTE — PROGRESS NOTES
Pt had ureteral stent placed on 06/15/21 following left HLL. Stent removed via string in office today without difficulty. Pt tolerated removal well.

## 2021-06-17 NOTE — PROGRESS NOTES
HPI:          Patient is a 70 y.o. male in no acute distress. He is alert and oriented to person, place, and time. History  2021 Left ureteral stent placement    2021 Left HLL    Today  Here today for stent removal following left holmium laser lithotripsy on 6/15/2021. He did tolerate stent removal with minimal complaints. He denies fevers, nausea or vomiting. Past Medical History:   Diagnosis Date    Arthritis     Hyperlipidemia     Kidney stone      Past Surgical History:   Procedure Laterality Date    BLADDER SURGERY Left 2021    CYSTOSCOPY   STENT INSERTION performed by Zuly Paredes MD at [address] [facility] Left 6/15/2021    CYSTOSCOPY STENT INSERTION/EXCHANGE performed by Zuly Paredes MD at [address] Left 2021    CYSTOSCOPY URETEROSCOPY performed by Zuly Paredes MD at [address] Left 06/15/2021    Dr. Sourav Sanchez, L stent insertion    CYSTOSCOPY Left 6/15/2021    CYSTOSCOPY URETEROSCOPY LASER, HLL performed by Zuly Paredes MD at [address] [facility] Left 2018    knee    [place] TOTAL KNEE ARTHROPLASTY Left 2018    KNEE TOTAL ARTHROPLASTY performed by Madeline Martinez MD at [address] [facility] Encounter Medications as of 2021   Medication Sig Dispense Refill    tamsulosin (FLOMAX) 0.4 MG capsule Take 1 capsule by mouth daily 30 capsule 0     No facility-administered encounter medications on file as of 2021. Current Outpatient Medications on File Prior to Visit   Medication Sig Dispense Refill    tamsulosin (FLOMAX) 0.4 MG capsule Take 1 capsule by mouth daily 30 capsule 0     No current facility-administered medications on file prior to visit. Patient has no known allergies. History reviewed. No pertinent family history.   Social History     Tobacco Use   Smoking Status Former Smoker    Types: Cigarettes    Quit date: 2000    Years since quittin.4   Smokeless Tobacco Never Used       Social History     Substance and Sexual Activity   Alcohol Use No       Review of Systems   Constitutional: Negative for appetite change, chills and fever. Eyes: Negative for redness and visual disturbance. Respiratory: Negative for cough, shortness of breath and wheezing. Cardiovascular: Negative for chest pain and leg swelling. Gastrointestinal: Negative for abdominal pain, constipation, nausea and vomiting. Genitourinary: Positive for hematuria. Negative for decreased urine volume, difficulty urinating, discharge, dysuria, enuresis, flank pain, frequency, penile pain, scrotal swelling, testicular pain and urgency. Musculoskeletal: Negative for back pain, joint swelling and myalgias. Skin: Negative for color change, rash and wound. Neurological: Negative for dizziness, tremors and numbness. Hematological: Negative for adenopathy. Does not bruise/bleed easily. /78 (Site: Right Upper Arm, Position: Sitting, Cuff Size: Medium Adult) Comment: manual  Temp 98.2 °F (36.8 °C) (Temporal)   Wt 209 lb (94.8 kg)   BMI 31.78 kg/m²       PHYSICAL EXAM:  Constitutional: Patient in no acute distress; Neuro: alert and oriented to person place and time. Psych: Mood and affect normal.  Skin: Normal  Lungs: Respiratory effort normal  Cardiovascular:  Normal peripheral pulses  Abdomen: Soft, non-tender, non-distended with no CVA, flank pain  Bladder non-tender and not distended. Lab Results   Component Value Date    BUN 17 05/17/2021     Lab Results   Component Value Date    CREATININE 1.69 (H) 05/17/2021     No results found for: PSA    ASSESSMENT:   Diagnosis Orders   1. Ureteral calculus  XR ABDOMEN (KUB) (SINGLE AP VIEW)         PLAN:  You may experience waves of pain and/or nausea for the next 24-72 hrs. You may also experience burning with urination, frequency, urgency, bladder spasms, and blood in the urine.  All of this should continue to improve over the next several

## 2021-06-17 NOTE — PATIENT INSTRUCTIONS
You may experience waves of pain and/or nausea for the next 24-72 hrs. You may also experience burning with urination, frequency, urgency, bladder spasms, and blood in the urine. All of this should continue to improve over the next several days. The blood in the urine can last up to two weeks. 1) take ibuprofen (motrin) 600 mg (3 of the 200mg tabs) every 6 hours WITH FOOD for the next 72 hours. 2) take Flomax for the next 72 hours. 3) drink at least 80 oz fluid (water, juice, Gatorade - NOT tea, coffee, soda pop) daily        Call our office [phone] or go to ER (if after normal office hours) if you develop fever, intractable vomiting, severe/intolerable pain. SURVEY:    You may be receiving a survey from [employer] regarding your visit today. Please complete the survey to enable us to provide the highest quality of care to you and your family. If you cannot score us a very good on any question, please call the office to discuss how we could have made your experience a very good one. Thank you.     Your MA today: Debbi Lee

## 2021-07-27 ENCOUNTER — HOSPITAL ENCOUNTER (OUTPATIENT)
Age: 71
Discharge: HOME | End: 2021-07-29
Payer: MEDICARE

## 2021-07-27 ENCOUNTER — HOSPITAL ENCOUNTER (OUTPATIENT)
Dept: GENERAL RADIOLOGY | Age: 71
Discharge: HOME | End: 2021-07-29
Payer: MEDICARE

## 2021-07-27 DIAGNOSIS — N20.1 URETERAL CALCULUS: ICD-10-CM

## 2021-07-27 PROCEDURE — 74018 RADEX ABDOMEN 1 VIEW: CPT

## 2021-07-29 ENCOUNTER — OFFICE VISIT (OUTPATIENT)
Dept: UROLOGY | Age: 71
End: 2021-07-29
Payer: MEDICARE

## 2021-07-29 VITALS
WEIGHT: 207 LBS | BODY MASS INDEX: 31.47 KG/M2 | TEMPERATURE: 97.7 F | SYSTOLIC BLOOD PRESSURE: 138 MMHG | DIASTOLIC BLOOD PRESSURE: 84 MMHG

## 2021-07-29 DIAGNOSIS — N20.1 URETERAL CALCULUS: Primary | ICD-10-CM

## 2021-07-29 PROCEDURE — 99211 OFF/OP EST MAY X REQ PHY/QHP: CPT

## 2021-07-29 PROCEDURE — 1036F TOBACCO NON-USER: CPT | Performed by: NURSE PRACTITIONER

## 2021-07-29 PROCEDURE — 3017F COLORECTAL CA SCREEN DOC REV: CPT | Performed by: NURSE PRACTITIONER

## 2021-07-29 PROCEDURE — 1123F ACP DISCUSS/DSCN MKR DOCD: CPT | Performed by: NURSE PRACTITIONER

## 2021-07-29 PROCEDURE — 4040F PNEUMOC VAC/ADMIN/RCVD: CPT | Performed by: NURSE PRACTITIONER

## 2021-07-29 PROCEDURE — 99213 OFFICE O/P EST LOW 20 MIN: CPT | Performed by: NURSE PRACTITIONER

## 2021-07-29 PROCEDURE — G8427 DOCREV CUR MEDS BY ELIG CLIN: HCPCS | Performed by: NURSE PRACTITIONER

## 2021-07-29 PROCEDURE — G8417 CALC BMI ABV UP PARAM F/U: HCPCS | Performed by: NURSE PRACTITIONER

## 2021-07-29 ASSESSMENT — ENCOUNTER SYMPTOMS
VOMITING: 0
COLOR CHANGE: 0
NAUSEA: 0
COUGH: 0
ABDOMINAL PAIN: 0
BACK PAIN: 0
SHORTNESS OF BREATH: 0
WHEEZING: 0
EYE REDNESS: 0
CONSTIPATION: 0

## 2021-07-29 NOTE — PATIENT INSTRUCTIONS
To prevent future stone formation:  1) drink around 80 ounces of water per day  2) Avoid/minimize intake of \"bad fluids\" (soda pop, coffee, tea, alcohol, energy drinks)  3) reduce consumption of sodium/salt  4) reduce consumption of fatty animal protein (full-fat cheese/milk/dairy, red meats, pork). Limit protein intake to low-fat/lean options (low-fat dairy, fish, chicken, turkey)        SURVEY:    You may be receiving a survey from [facility] regarding your visit today. Please complete the survey to enable us to provide the highest quality of care to you and your family. If you cannot score us a very good on any question, please call the office to discuss how we could have made your experience a very good one. Thank you.     Your MA today: Av Marshall

## 2021-07-29 NOTE — PROGRESS NOTES
HPI:          Patient is a 70 y.o. male in no acute distress. He is alert and oriented to person, place, and time. History  5/2021 Left ureteral stent placement    6/2021 Left HLL    Today  Here today to follow-up for stones. He denies any flank or abdominal pain, dysuria or gross hematuria. He did have a KUB completed prior to today's visit. This was independently reviewed shows no evidence of  calcifications. Past Medical History:   Diagnosis Date    Arthritis     Hyperlipidemia     Kidney stone      Past Surgical History:   Procedure Laterality Date    BLADDER SURGERY Left 5/18/2021    CYSTOSCOPY   STENT INSERTION performed by Lorene Ryan MD at [address] [facility] Left 6/15/2021    CYSTOSCOPY STENT INSERTION/EXCHANGE performed by Lorene Ryan MD at [address] [place] [address] Drive Left 5/18/2021    CYSTOSCOPY URETEROSCOPY performed by Lorene Ryan MD at [address] [place] [address] Drive Left 06/15/2021    Dr. Mell Santiago, L stent insertion    CYSTOSCOPY Left 6/15/2021    CYSTOSCOPY URETEROSCOPY LASER, HLL performed by Lorene Ryan MD at [address] [facility] Left 07/12/2018    knee    [place] TOTAL KNEE ARTHROPLASTY Left 7/12/2018    KNEE TOTAL ARTHROPLASTY performed by Krunal Orourke MD at [address] [facility] Encounter Medications as of 7/29/2021   Medication Sig Dispense Refill    tamsulosin (FLOMAX) 0.4 MG capsule Take 1 capsule by mouth daily (Patient not taking: Reported on 7/29/2021) 30 capsule 0     No facility-administered encounter medications on file as of 7/29/2021. Current Outpatient Medications on File Prior to Visit   Medication Sig Dispense Refill    tamsulosin (FLOMAX) 0.4 MG capsule Take 1 capsule by mouth daily (Patient not taking: Reported on 7/29/2021) 30 capsule 0     No current facility-administered medications on file prior to visit. Patient has no known allergies. History reviewed. No pertinent family history.   Social History Tobacco Use   Smoking Status Former Smoker    Types: Cigarettes    Quit date:     Years since quittin.5   Smokeless Tobacco Never Used       Social History     Substance and Sexual Activity   Alcohol Use No       Review of Systems   Constitutional: Negative for appetite change, chills and fever. Eyes: Negative for redness and visual disturbance. Respiratory: Negative for cough, shortness of breath and wheezing. Cardiovascular: Negative for chest pain and leg swelling. Gastrointestinal: Negative for abdominal pain, constipation, nausea and vomiting. Genitourinary: Negative for decreased urine volume, difficulty urinating, discharge, dysuria, enuresis, flank pain, frequency, hematuria, penile pain, scrotal swelling, testicular pain and urgency. Musculoskeletal: Negative for back pain, joint swelling and myalgias. Skin: Negative for color change, rash and wound. Neurological: Negative for dizziness, tremors and numbness. Hematological: Negative for adenopathy. Does not bruise/bleed easily. /84 (Site: Right Upper Arm, Position: Sitting, Cuff Size: Large Adult) Comment: manual  Temp 97.7 °F (36.5 °C) (Temporal)   Wt 207 lb (93.9 kg)   BMI 31.47 kg/m²       PHYSICAL EXAM:  Constitutional: Patient in no acute distress; Neuro: alert and oriented to person place and time. Psych: Mood and affect normal.  Skin: Normal  Lungs: Respiratory effort normal  Cardiovascular:  Normal peripheral pulses  Abdomen: Soft, non-tender, non-distended with no CVA, flank pain  Bladder non-tender and not distended. Lab Results   Component Value Date    BUN 17 2021     Lab Results   Component Value Date    CREATININE 1.69 (H) 2021     No results found for: PSA    ASSESSMENT:   Diagnosis Orders   1.  Ureteral calculus  XR ABDOMEN (KUB) (SINGLE AP VIEW)         PLAN:  To prevent future stone formation:  1) drink around 80 ounces of water per day  2) Avoid/minimize intake of \"bad fluids\" (soda pop, coffee, tea, alcohol, energy drinks)  3) reduce consumption of sodium/salt  4) reduce consumption of fatty animal protein (full-fat cheese/milk/dairy, red meats, pork).  Limit protein intake to low-fat/lean options (low-fat dairy, fish, chicken, turkey)    Follow-up in one year with a KUB prior or sooner if needed

## 2021-11-18 PROBLEM — H25.041 POSTERIOR SUBCAPSULAR AGE-RELATED CATARACT OF RIGHT EYE: Chronic | Status: ACTIVE | Noted: 2021-11-18

## 2021-11-18 NOTE — H&P
Omar Roblero is a 70y.o. year old who presents for elective outpatient ophthalmic surgery with Michael Crain DO. The patient complains of decreased vision, glare and halos around lights, and trouble with vision for activities of daily living. Past Medical History:   Diagnosis Date    Arthritis     Hyperlipidemia     Kidney stone        Past Surgical History:   Procedure Laterality Date    BLADDER SURGERY Left 5/18/2021    CYSTOSCOPY   STENT INSERTION performed by Alix Rudolph MD at [address] [facility] Left 6/15/2021    CYSTOSCOPY STENT INSERTION/EXCHANGE performed by Alix Rudolph MD at [address] [facility] Left 5/18/2021    CYSTOSCOPY URETEROSCOPY performed by Alix Rudolph MD at [address] [place] [address] Left 06/15/2021    Dr. Clement Bangura, L stent insertion    CYSTOSCOPY Left 6/15/2021    CYSTOSCOPY URETEROSCOPY LASER, HLL performed by Alix Rudolph MD at [address] [facility] Left 07/12/2018    knee    [place] TOTAL KNEE ARTHROPLASTY Left 7/12/2018    KNEE TOTAL ARTHROPLASTY performed by Zack Cannon MD at [address] meds:   Prior to Admission medications    Medication Sig Start Date End Date Taking? Authorizing Provider   tamsulosin (FLOMAX) 0.4 MG capsule Take 1 capsule by mouth daily  Patient not taking: Reported on 7/29/2021 5/14/21   SHEYLA Montoya - CNP     Scheduled Meds:  Continuous Infusions:  PRN Meds:. No Known Allergies    There were no vitals filed for this visit. PHYSICAL EXAMINATION    Gen: NAD  HEENT: BCVA= 20/40, Glare testing= NLP, Cataract severity/type-3+ Posterior Subcapsular Age-Related Cataract-right eye, Other significant ocular findings= dry eye  Pulm: CTA   Heart: RRR, no C/M/R/G  Abd: S/NT/ND  Neuro: no focal defecits    Assessment:   1. Posterior Subcapsular Age-Related Cataract-right eye  2. ASA Score-1  3. Mallampati Score-Class 3    Plan:   1.  Risks, benefits, alternatives to surgery discussed with the patient and family. 2. All questions were answered to their satisfaction. 3. Ok to proceed with surgery as planned.     Loria Apgar, , DO

## 2021-11-22 ENCOUNTER — HOSPITAL ENCOUNTER (OUTPATIENT)
Age: 71
Setting detail: OUTPATIENT SURGERY
Discharge: HOME | End: 2021-11-22
Attending: OPHTHALMOLOGY | Admitting: OPHTHALMOLOGY
Payer: MEDICARE

## 2021-11-22 VITALS
HEIGHT: 68 IN | TEMPERATURE: 98.2 F | SYSTOLIC BLOOD PRESSURE: 167 MMHG | DIASTOLIC BLOOD PRESSURE: 74 MMHG | BODY MASS INDEX: 33.04 KG/M2 | OXYGEN SATURATION: 97 % | RESPIRATION RATE: 16 BRPM | WEIGHT: 218 LBS | HEART RATE: 88 BPM

## 2021-11-22 PROBLEM — H57.00: Chronic | Status: RESOLVED | Noted: 2021-11-22 | Resolved: 2021-11-22

## 2021-11-22 PROBLEM — H57.00: Chronic | Status: ACTIVE | Noted: 2021-11-22

## 2021-11-22 PROBLEM — H25.041 POSTERIOR SUBCAPSULAR AGE-RELATED CATARACT OF RIGHT EYE: Chronic | Status: RESOLVED | Noted: 2021-11-18 | Resolved: 2021-11-22

## 2021-11-22 PROCEDURE — 7100000011 HC PHASE II RECOVERY - ADDTL 15 MIN: Performed by: OPHTHALMOLOGY

## 2021-11-22 PROCEDURE — 7100000010 HC PHASE II RECOVERY - FIRST 15 MIN: Performed by: OPHTHALMOLOGY

## 2021-11-22 PROCEDURE — 2580000003 HC RX 258: Performed by: OPHTHALMOLOGY

## 2021-11-22 PROCEDURE — 3600000013 HC SURGERY LEVEL 3 ADDTL 15MIN: Performed by: OPHTHALMOLOGY

## 2021-11-22 PROCEDURE — V2632 POST CHMBR INTRAOCULAR LENS: HCPCS | Performed by: OPHTHALMOLOGY

## 2021-11-22 PROCEDURE — 6370000000 HC RX 637 (ALT 250 FOR IP): Performed by: OPHTHALMOLOGY

## 2021-11-22 PROCEDURE — 3600000003 HC SURGERY LEVEL 3 BASE: Performed by: OPHTHALMOLOGY

## 2021-11-22 PROCEDURE — 2709999900 HC NON-CHARGEABLE SUPPLY: Performed by: OPHTHALMOLOGY

## 2021-11-22 PROCEDURE — 2720000010 HC SURG SUPPLY STERILE: Performed by: OPHTHALMOLOGY

## 2021-11-22 PROCEDURE — 2500000003 HC RX 250 WO HCPCS: Performed by: OPHTHALMOLOGY

## 2021-11-22 PROCEDURE — 99152 MOD SED SAME PHYS/QHP 5/>YRS: CPT | Performed by: OPHTHALMOLOGY

## 2021-11-22 PROCEDURE — 6360000002 HC RX W HCPCS: Performed by: OPHTHALMOLOGY

## 2021-11-22 PROCEDURE — 99153 MOD SED SAME PHYS/QHP EA: CPT | Performed by: OPHTHALMOLOGY

## 2021-11-22 DEVICE — LENS INTOCU L12.5 MM OD6 MM +21.5 D HYDROPHOBIC ENVISTA: Type: IMPLANTABLE DEVICE | Site: EYE | Status: FUNCTIONAL

## 2021-11-22 RX ORDER — SODIUM CHLORIDE 9 MG/ML
25 INJECTION, SOLUTION INTRAVENOUS PRN
Status: DISCONTINUED | OUTPATIENT
Start: 2021-11-22 | End: 2021-11-22 | Stop reason: HOSPADM

## 2021-11-22 RX ORDER — SODIUM CHLORIDE 0.9 % (FLUSH) 0.9 %
10 SYRINGE (ML) INJECTION PRN
Status: DISCONTINUED | OUTPATIENT
Start: 2021-11-22 | End: 2021-11-22 | Stop reason: HOSPADM

## 2021-11-22 RX ORDER — TETRACAINE HYDROCHLORIDE 5 MG/ML
SOLUTION OPHTHALMIC PRN
Status: DISCONTINUED | OUTPATIENT
Start: 2021-11-22 | End: 2021-11-22 | Stop reason: ALTCHOICE

## 2021-11-22 RX ORDER — SODIUM CHLORIDE 0.9 % (FLUSH) 0.9 %
10 SYRINGE (ML) INJECTION EVERY 12 HOURS SCHEDULED
Status: DISCONTINUED | OUTPATIENT
Start: 2021-11-22 | End: 2021-11-22 | Stop reason: HOSPADM

## 2021-11-22 RX ORDER — TROPICAMIDE 10 MG/ML
1 SOLUTION/ DROPS OPHTHALMIC
Status: COMPLETED | OUTPATIENT
Start: 2021-11-22 | End: 2021-11-22

## 2021-11-22 RX ORDER — PROPARACAINE HYDROCHLORIDE 5 MG/ML
1 SOLUTION/ DROPS OPHTHALMIC
Status: COMPLETED | OUTPATIENT
Start: 2021-11-22 | End: 2021-11-22

## 2021-11-22 RX ORDER — PHENYLEPHRINE HCL 2.5 %
1 DROPS OPHTHALMIC (EYE)
Status: COMPLETED | OUTPATIENT
Start: 2021-11-22 | End: 2021-11-22

## 2021-11-22 RX ORDER — TETRACAINE HYDROCHLORIDE 5 MG/ML
1 SOLUTION OPHTHALMIC ONCE
Status: COMPLETED | OUTPATIENT
Start: 2021-11-22 | End: 2021-11-22

## 2021-11-22 RX ORDER — FENTANYL CITRATE 50 UG/ML
INJECTION, SOLUTION INTRAMUSCULAR; INTRAVENOUS PRN
Status: DISCONTINUED | OUTPATIENT
Start: 2021-11-22 | End: 2021-11-22 | Stop reason: ALTCHOICE

## 2021-11-22 RX ORDER — MIDAZOLAM HYDROCHLORIDE 1 MG/ML
INJECTION INTRAMUSCULAR; INTRAVENOUS PRN
Status: DISCONTINUED | OUTPATIENT
Start: 2021-11-22 | End: 2021-11-22 | Stop reason: ALTCHOICE

## 2021-11-22 RX ORDER — SODIUM CHLORIDE 9 MG/ML
INJECTION, SOLUTION INTRAVENOUS CONTINUOUS
Status: DISCONTINUED | OUTPATIENT
Start: 2021-11-22 | End: 2021-11-22 | Stop reason: HOSPADM

## 2021-11-22 RX ORDER — LIDOCAINE HYDROCHLORIDE 10 MG/ML
INJECTION, SOLUTION EPIDURAL; INFILTRATION; INTRACAUDAL; PERINEURAL PRN
Status: DISCONTINUED | OUTPATIENT
Start: 2021-11-22 | End: 2021-11-22 | Stop reason: ALTCHOICE

## 2021-11-22 RX ADMIN — PROPARACAINE HYDROCHLORIDE 1 DROP: 5 SOLUTION/ DROPS OPHTHALMIC at 11:05

## 2021-11-22 RX ADMIN — TROPICAMIDE 1 DROP: 10 SOLUTION/ DROPS OPHTHALMIC at 11:05

## 2021-11-22 RX ADMIN — TROPICAMIDE 1 DROP: 10 SOLUTION/ DROPS OPHTHALMIC at 10:53

## 2021-11-22 RX ADMIN — PHENYLEPHRINE HYDROCHLORIDE 1 DROP: 25 SOLUTION/ DROPS OPHTHALMIC at 10:53

## 2021-11-22 RX ADMIN — PHENYLEPHRINE HYDROCHLORIDE 1 DROP: 25 SOLUTION/ DROPS OPHTHALMIC at 10:58

## 2021-11-22 RX ADMIN — TETRACAINE HYDROCHLORIDE 1 DROP: 5 SOLUTION OPHTHALMIC at 11:17

## 2021-11-22 RX ADMIN — PHENYLEPHRINE HYDROCHLORIDE 1 DROP: 25 SOLUTION/ DROPS OPHTHALMIC at 11:05

## 2021-11-22 RX ADMIN — PROPARACAINE HYDROCHLORIDE 1 DROP: 5 SOLUTION/ DROPS OPHTHALMIC at 10:58

## 2021-11-22 RX ADMIN — PROPARACAINE HYDROCHLORIDE 1 DROP: 5 SOLUTION/ DROPS OPHTHALMIC at 10:52

## 2021-11-22 RX ADMIN — TROPICAMIDE 1 DROP: 10 SOLUTION/ DROPS OPHTHALMIC at 10:58

## 2021-11-22 RX ADMIN — SODIUM CHLORIDE: 9 INJECTION, SOLUTION INTRAVENOUS at 11:07

## 2021-11-22 ASSESSMENT — PAIN SCALES - GENERAL
PAINLEVEL_OUTOF10: 0

## 2021-11-22 NOTE — H&P
I have examined the patient and reviewed the history and physical completed on 11/18/21 and find no relevant changes. I have reviewed with the patient and/or family the risks, benefits, and alternatives to the procedure and they have agreed to proceed.     Belem Hackett, DO  11/22/2021  10:56 AM

## 2021-11-22 NOTE — PROGRESS NOTES
Patient verbalizes readiness for discharge at this time. Discharge Criteria    Inpatients must meet Criteria 1 through 7. All other patients are either YES or N/A. If a NO is chosen then Anesthesia or Surgeon must be notified. 1.  Minimum 30 minutes after last dose of sedative medication, minimum 120 minutes after last dose of reversal agent. Yes      2. Systolic BP stable within 20 mmHg for 30 minutes & systolic BP between 90 & 016 or within 10 mmHg of baseline. Yes      3. Pulse between 60 and 100 or within 10 bpm of baseline. Yes      4. Spontaneous respiratory rate >/= 10 per minute. Yes      5. SaO2 >/= 95 or  >/= baseline. Yes      6. Able to cough and swallow or return to baseline function. Yes      7. Alert and oriented or return to baseline mental status. Yes      8. Demonstrates controlled, coordinated movements, ambulates with steady gait, or return to baseline activity function. Yes      9. Minimal or no pain or nausea, or at a level tolerable and acceptable to patient. Yes      10. Takes and retains oral fluids as allowed. Yes      11. Procedural / perioperative site stable. Minimal or no bleeding. Yes          12. If GI endoscopy procedure, minimal or no abdominal distention or passing flatus. N/A      13. Written discharge instructions and emergency telephone number provided. Yes      14. Accompanied by a responsible adult.     Yes

## 2021-11-22 NOTE — OP NOTE
OPERATIVE NOTE      Patient: Nicholas Jeter    YOB: 1950    Account Number: [de-identified]    Surgeon:  Marilou Dong. Александр Willoughby DO    PCP: No primary care provider on file. 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 year old with decreased vision, glare and halos around lights, and trouble with activities of daily living. Examination revealed a visually significant cataract in the right eye. Other eye diseases have been ruled out as the primary cause of decreased visual acuity. Significant improvement in the patient's visual acuity and/or visual function is expected from the removal of the cataract. Risks, benefits, and alternatives to surgery were discussed with the patient and the patient elected to proceed with phacoemulsification with lens implantation. Details of the procedure: Following informed consent, the patient was identified by name and identification tag in the holding area,taken to the operating room and placed in the supine position. A time out was performed by all present in the room to identify the patient, the eye to be operated on, the correct operative procedure, and the correct intraocular lens. Monitoring leads were placed. The patient was positioned. An eyelid prep of half-strength Betadine was performed. The patient was administered intravenous sedation if desired and topical anesthetic was placed in the operative eye. The eye prepped a second time and draped in the usual sterile fashion using aseptic technique for cataract surgery. A lid speculum was then inserted.   Ocucoat was placed onto the corneal surface. A stab incision was made using a disposable blade into the anterior chamber. Intracameral preservative free xylocaine with epinephrine was placed into the anterior chamber. Amvisc was then used to replace the aqueous of the anterior chamber. A 2.2 mm keratome blade was used to make a 3-plane clear corneal incision into cornea depending on the patient's astigmatism. The cystitome was used to make a tear in the anterior capsule. Fine forceps were used to make a complete curvilinear capsulorrhexis. The lens was hydrodissected and freely rotated using a balanced salt filled syringe. The ultrasound handpiece was then used to emulsify the nucleus and epi nucleus. The residual cortex was then aspirated using the IA handpiece. The posterior capsule was polished. The eye was filled with viscoelastic and a foldable posterior chamber intraocular lens was injected into the capsular bag unless otherwise stated in the addendum. Irrigation/aspiration was used to remove all excess viscoelastic. The eye was pressurized and the wounds were check for leaks and none were found. A collagen shield, which had been soaked in antibiotic drops, was then placed onto the cornea. The lid speculum was removed. The eye was covered with a clear plastic shield. The patient was taken to the recovery area in excellent condition, having tolerated the procedure well, and will follow up with me tomorrow for postop care. ADDENDUM:  The phacoemulsification time 48.87 seconds @ 16% average ultrasound power for an effective phacoemulsification time of 8.25 seconds. The lens inserted was a model MX60 made by [facility] [facility] [employer] that was +21.5 diopters in power. The lens was inserted into the capsular bag utilizing the BLIS-X1 injector made by [facility].  The serial number on this lens was [id number]. A Malyugin ring was used to keep the pupil dilated during this procedure.   It was removed without difficulty after the lens was implanted. There was no stitch placed to close this temporal posterior corneal incision. Estimated blood loss was < 1.0 ml. Nohemy Cifuentes.  Cristy Bautista DO

## 2021-12-06 ENCOUNTER — HOSPITAL ENCOUNTER (OUTPATIENT)
Age: 71
Setting detail: OUTPATIENT SURGERY
Discharge: HOME | End: 2021-12-06
Attending: OPHTHALMOLOGY | Admitting: OPHTHALMOLOGY
Payer: MEDICARE

## 2021-12-06 VITALS
HEART RATE: 76 BPM | DIASTOLIC BLOOD PRESSURE: 75 MMHG | TEMPERATURE: 97.9 F | OXYGEN SATURATION: 95 % | RESPIRATION RATE: 18 BRPM | SYSTOLIC BLOOD PRESSURE: 144 MMHG

## 2021-12-06 PROBLEM — H25.042 POSTERIOR SUBCAPSULAR AGE-RELATED CATARACT OF LEFT EYE: Chronic | Status: RESOLVED | Noted: 2021-12-06 | Resolved: 2021-12-06

## 2021-12-06 PROBLEM — H25.042 POSTERIOR SUBCAPSULAR AGE-RELATED CATARACT OF LEFT EYE: Chronic | Status: ACTIVE | Noted: 2021-12-06

## 2021-12-06 PROCEDURE — 99152 MOD SED SAME PHYS/QHP 5/>YRS: CPT | Performed by: OPHTHALMOLOGY

## 2021-12-06 PROCEDURE — 6360000002 HC RX W HCPCS: Performed by: OPHTHALMOLOGY

## 2021-12-06 PROCEDURE — V2632 POST CHMBR INTRAOCULAR LENS: HCPCS | Performed by: OPHTHALMOLOGY

## 2021-12-06 PROCEDURE — 6370000000 HC RX 637 (ALT 250 FOR IP): Performed by: OPHTHALMOLOGY

## 2021-12-06 PROCEDURE — 3600000003 HC SURGERY LEVEL 3 BASE: Performed by: OPHTHALMOLOGY

## 2021-12-06 PROCEDURE — 2500000003 HC RX 250 WO HCPCS: Performed by: OPHTHALMOLOGY

## 2021-12-06 PROCEDURE — 7100000011 HC PHASE II RECOVERY - ADDTL 15 MIN: Performed by: OPHTHALMOLOGY

## 2021-12-06 PROCEDURE — 2709999900 HC NON-CHARGEABLE SUPPLY: Performed by: OPHTHALMOLOGY

## 2021-12-06 PROCEDURE — 99153 MOD SED SAME PHYS/QHP EA: CPT | Performed by: OPHTHALMOLOGY

## 2021-12-06 PROCEDURE — 7100000010 HC PHASE II RECOVERY - FIRST 15 MIN: Performed by: OPHTHALMOLOGY

## 2021-12-06 PROCEDURE — 3600000013 HC SURGERY LEVEL 3 ADDTL 15MIN: Performed by: OPHTHALMOLOGY

## 2021-12-06 DEVICE — LENS INTRAOCULAR BCNVX 22+ DIOPT 6X12.5 MM ACRYL ENVISTA: Type: IMPLANTABLE DEVICE | Site: EYE | Status: FUNCTIONAL

## 2021-12-06 RX ORDER — SODIUM CHLORIDE 9 MG/ML
INJECTION, SOLUTION INTRAVENOUS CONTINUOUS
Status: DISCONTINUED | OUTPATIENT
Start: 2021-12-06 | End: 2021-12-06 | Stop reason: HOSPADM

## 2021-12-06 RX ORDER — SODIUM CHLORIDE 9 MG/ML
25 INJECTION, SOLUTION INTRAVENOUS PRN
Status: DISCONTINUED | OUTPATIENT
Start: 2021-12-06 | End: 2021-12-06 | Stop reason: HOSPADM

## 2021-12-06 RX ORDER — SODIUM CHLORIDE 0.9 % (FLUSH) 0.9 %
5-40 SYRINGE (ML) INJECTION EVERY 12 HOURS SCHEDULED
Status: DISCONTINUED | OUTPATIENT
Start: 2021-12-06 | End: 2021-12-06 | Stop reason: HOSPADM

## 2021-12-06 RX ORDER — SODIUM CHLORIDE 0.9 % (FLUSH) 0.9 %
5-40 SYRINGE (ML) INJECTION PRN
Status: DISCONTINUED | OUTPATIENT
Start: 2021-12-06 | End: 2021-12-06 | Stop reason: HOSPADM

## 2021-12-06 RX ORDER — SODIUM CHLORIDE 9 MG/ML
25 INJECTION, SOLUTION INTRAVENOUS PRN
Status: CANCELLED | OUTPATIENT
Start: 2021-12-06

## 2021-12-06 RX ORDER — PROPARACAINE HYDROCHLORIDE 5 MG/ML
1 SOLUTION/ DROPS OPHTHALMIC EVERY 5 MIN PRN
Status: COMPLETED | OUTPATIENT
Start: 2021-12-06 | End: 2021-12-06

## 2021-12-06 RX ORDER — TETRACAINE HYDROCHLORIDE 5 MG/ML
SOLUTION OPHTHALMIC PRN
Status: DISCONTINUED | OUTPATIENT
Start: 2021-12-06 | End: 2021-12-06 | Stop reason: ALTCHOICE

## 2021-12-06 RX ORDER — SODIUM CHLORIDE 0.9 % (FLUSH) 0.9 %
10 SYRINGE (ML) INJECTION EVERY 12 HOURS SCHEDULED
Status: CANCELLED | OUTPATIENT
Start: 2021-12-06

## 2021-12-06 RX ORDER — TROPICAMIDE 10 MG/ML
1 SOLUTION/ DROPS OPHTHALMIC EVERY 5 MIN PRN
Status: COMPLETED | OUTPATIENT
Start: 2021-12-06 | End: 2021-12-06

## 2021-12-06 RX ORDER — PHENYLEPHRINE HCL 2.5 %
1 DROPS OPHTHALMIC (EYE) EVERY 5 MIN PRN
Status: COMPLETED | OUTPATIENT
Start: 2021-12-06 | End: 2021-12-06

## 2021-12-06 RX ORDER — MIDAZOLAM HYDROCHLORIDE 1 MG/ML
INJECTION INTRAMUSCULAR; INTRAVENOUS PRN
Status: DISCONTINUED | OUTPATIENT
Start: 2021-12-06 | End: 2021-12-06 | Stop reason: ALTCHOICE

## 2021-12-06 RX ORDER — SODIUM CHLORIDE 0.9 % (FLUSH) 0.9 %
10 SYRINGE (ML) INJECTION PRN
Status: CANCELLED | OUTPATIENT
Start: 2021-12-06

## 2021-12-06 RX ORDER — LIDOCAINE HYDROCHLORIDE 10 MG/ML
INJECTION, SOLUTION EPIDURAL; INFILTRATION; INTRACAUDAL; PERINEURAL PRN
Status: DISCONTINUED | OUTPATIENT
Start: 2021-12-06 | End: 2021-12-06 | Stop reason: ALTCHOICE

## 2021-12-06 RX ORDER — TETRACAINE HYDROCHLORIDE 5 MG/ML
1 SOLUTION OPHTHALMIC SEE ADMIN INSTRUCTIONS
Status: DISCONTINUED | OUTPATIENT
Start: 2021-12-06 | End: 2021-12-06 | Stop reason: HOSPADM

## 2021-12-06 RX ORDER — FENTANYL CITRATE 50 UG/ML
INJECTION, SOLUTION INTRAMUSCULAR; INTRAVENOUS PRN
Status: DISCONTINUED | OUTPATIENT
Start: 2021-12-06 | End: 2021-12-06 | Stop reason: ALTCHOICE

## 2021-12-06 RX ADMIN — PROPARACAINE HYDROCHLORIDE 1 DROP: 5 SOLUTION/ DROPS OPHTHALMIC at 08:06

## 2021-12-06 RX ADMIN — TROPICAMIDE 1 DROP: 10 SOLUTION/ DROPS OPHTHALMIC at 08:18

## 2021-12-06 RX ADMIN — PHENYLEPHRINE HYDROCHLORIDE 1 DROP: 25 SOLUTION/ DROPS OPHTHALMIC at 08:12

## 2021-12-06 RX ADMIN — PROPARACAINE HYDROCHLORIDE 1 DROP: 5 SOLUTION/ DROPS OPHTHALMIC at 08:12

## 2021-12-06 RX ADMIN — PROPARACAINE HYDROCHLORIDE 1 DROP: 5 SOLUTION/ DROPS OPHTHALMIC at 08:18

## 2021-12-06 RX ADMIN — PHENYLEPHRINE HYDROCHLORIDE 1 DROP: 25 SOLUTION/ DROPS OPHTHALMIC at 08:18

## 2021-12-06 RX ADMIN — TETRACAINE HYDROCHLORIDE 1 DROP: 5 SOLUTION OPHTHALMIC at 08:58

## 2021-12-06 RX ADMIN — TROPICAMIDE 1 DROP: 10 SOLUTION/ DROPS OPHTHALMIC at 08:12

## 2021-12-06 RX ADMIN — TROPICAMIDE 1 DROP: 10 SOLUTION/ DROPS OPHTHALMIC at 08:06

## 2021-12-06 RX ADMIN — PHENYLEPHRINE HYDROCHLORIDE 1 DROP: 25 SOLUTION/ DROPS OPHTHALMIC at 08:07

## 2021-12-06 ASSESSMENT — PAIN SCALES - GENERAL
PAINLEVEL_OUTOF10: 0
PAINLEVEL_OUTOF10: 0

## 2021-12-06 NOTE — PROGRESS NOTES
Discharge instructions given to pt and wife. Discharge Criteria    Inpatients must meet Criteria 1 through 7. All other patients are either YES or N/A. If a NO is chosen then Anesthesia or Surgeon must be notified. 1.  Minimum 30 minutes after last dose of sedative medication, minimum 120 minutes after last dose of reversal agent. Yes      2. Systolic BP stable within 20 mmHg for 30 minutes & systolic BP between 90 & 214 or within 10 mmHg of baseline. Yes      3. Pulse between 60 and 100 or within 10 bpm of baseline. Yes      4. Spontaneous respiratory rate >/= 10 per minute. Yes      5. SaO2 >/= 95 or  >/= baseline. Yes      6. Able to cough and swallow or return to baseline function. Yes      7. Alert and oriented or return to baseline mental status. Yes      8. Demonstrates controlled, coordinated movements, ambulates with steady gait, or return to baseline activity function. Yes      9. Minimal or no pain or nausea, or at a level tolerable and acceptable to patient. Yes      10. Takes and retains oral fluids as allowed. Yes      11. Procedural / perioperative site stable. Minimal or no bleeding. Yes          12. If GI endoscopy procedure, minimal or no abdominal distention or passing flatus. N/A      13. Written discharge instructions and emergency telephone number provided. Yes      14. Accompanied by a responsible adult.     Yes

## 2021-12-06 NOTE — H&P
Pantera Lange is a 70y.o. year old who presents for elective outpatient ophthalmic surgery with Essence Stovall DO. The patient complains of decreased vision, glare and halos around lights, and trouble with vision for activities of daily living. Past Medical History:   Diagnosis Date    Arthritis     Hyperlipidemia     Kidney stone        Past Surgical History:   Procedure Laterality Date    BLADDER SURGERY Left 5/18/2021    CYSTOSCOPY   STENT INSERTION performed by Yessy Marin MD at [facility] Left 6/15/2021    CYSTOSCOPY STENT INSERTION/EXCHANGE performed by Yessy Marin MD at [address] [place] [address] Right 11/22/2021    Dr. Carissa Castle Left 5/18/2021    CYSTOSCOPY URETEROSCOPY performed by Yessy Marin MD at [address] Left 06/15/2021    Dr. Mary Ann Valencia, L stent insertion    CYSTOSCOPY Left 6/15/2021    CYSTOSCOPY URETEROSCOPY LASER, HLL performed by Yessy Marin MD at [place] [address] Right 11/22/2021    EYE CATARACT EMULSIFICATION IOL IMPLANT performed by Essence Stovall DO at [address] [facility] Left 07/12/2018    knee    AL TOTAL KNEE ARTHROPLASTY Left 7/12/2018    KNEE TOTAL ARTHROPLASTY performed by Zo Braxton MD at [address],[facility] meds:   Prior to Admission medications    Not on File     Scheduled Meds:  Continuous Infusions:   sodium chloride       PRN Meds:. No Known Allergies    Vitals:    12/06/21 0800   BP: (!) 176/155   Pulse: 69   Resp: 18   Temp: 97.6 °F (36.4 °C)   SpO2: 96%       PHYSICAL EXAMINATION    Gen: NAD  HEENT: BCVA= 20/20, Glare testing= 20/200, Cataract severity/type-2+ Posterior Subcapsular Age Related Cataract-left eye, Other significant ocular findings= dry eye  Pulm: CTA   Heart: RRR, no C/M/R/G  Abd: S/NT/ND  Neuro: no focal defecits    Assessment:   1.   Posterior Subcapsular Age Related Cataract-left eye  2.  ASA Score-1  3. Mallampati Score-Class 3    Plan:   1. Risks, benefits, alternatives to surgery discussed with the patient and family. 2. All questions were answered to their satisfaction. 3. Ok to proceed with surgery as planned.     Ronnie Davalos, , DO

## 2021-12-06 NOTE — OP NOTE
OPERATIVE NOTE      Patient:  Shaniqua Dickinson    YOB: 1950    Account #:  [de-identified]    Date:  12/6/2021    Surgeon:  Lobito Velasco. Cliff Waller DO    Primary Care Physician:No primary care provider on file. Preoperative Diagnosis: Combined forms of Age Related Cataract- left eye    Postoperative Diagnosis: Same    Procedure: Phacoemulsification with intraocular lens implantation, left eye    Anesthesia: Topical and intracameral anesthesia with intravenous sedation    Complications: none    Specimens: none    Indications for procedure: The patient is a 70y.o. year old with decreased vision, glare and halos around lights, and trouble with activities of daily living. Examination revealed a visually significant cataract in the left eye. Other eye diseases have been ruled out as the primary cause of decreased visual function. Significant improvement is expected in the patient's visual acuity and/or visual function from the removal of the cataract. Risks, benefits, and alternatives to surgery were discussed with the patient and the patient elected to proceed with phacoemulsification with lens implantation. Details of the procedure: Following informed consent, the patient was identified by name and identification tag in the holding area,taken to the operating room and placed in the supine position. A time out was performed by all present in the room to identify the patient, the eye to be operated on, the correct operative procedure, and the correct intraocular lens. Monitoring leads were placed. The patient was positioned. An eyelid prep of half-strength Betadine was performed. The patient was administered intravenous sedation if desired and topical anesthetic was placed in the operative eye. The eye prepped a second time and draped in the usual sterile fashion using aseptic technique for cataract surgery. A lid speculum was then inserted. Ocucoat was placed onto the corneal surface.   A stab incision was made using a disposable blade into the anterior chamber. Intracameral preservative free xylocaine was placed into the anterior chamber. Amvisc was then used to replace the aqueous of the anterior chamber. A 2.2 mm keratome blade was used to make a 3-plane clear corneal incision into cornea depending on the patient's astigmatism. The cystitome was used to make a tear in the anterior capsule. Fine forceps were used to make a complete curvilinear capsulorrhexis. The lens was hydrodissected and freely rotated using a balanced salt filled syringe. The ultrasound handpiece was then used to emulsify the nucleus and epi nucleus. The residual cortex was then aspirated using the IA handpiece. The posterior capsule was polished. The eye was filled with viscoelastic and a foldable posterior chamber intraocular lens was injected into the capsular bag unless otherwise stated in the addendum. Irrigation/aspiration was used to remove all excess viscoelastic. The eye was pressurized and the wounds were check for leaks and none were found. A collagen shield, which had been soaked in antibiotic drops, was then placed onto the cornea. The lid speculum was removed. The eye was covered with a clear plastic shield. The patient was taken to the recovery area in excellent condition, having tolerated the procedure well, and will follow up with me tomorrow for postop care. ADDENDUM:  The phacoemulsification time 25.11 seconds @ 10% average ultrasound power for an effective phacoemulsification time of 2.70 seconds. The lens inserted was a model MX60 made by [facility] that was +22.0 diopters in power. The lens was inserted into the capsular bag utilizing the BLIS-X1 injector made by [facility].  The serial number on this lens was [id number]. There was no stitch placed to close this temporal posterior corneal incision. EBL was less than 1.0 ml. Chaz Collado.  Lilibeth Carty DO

## 2022-07-21 ENCOUNTER — HOSPITAL ENCOUNTER (OUTPATIENT)
Dept: GENERAL RADIOLOGY | Age: 72
Discharge: HOME | End: 2022-07-23
Payer: MEDICARE

## 2022-07-21 ENCOUNTER — HOSPITAL ENCOUNTER (OUTPATIENT)
Age: 72
Discharge: HOME | End: 2022-07-23
Payer: MEDICARE

## 2022-07-21 DIAGNOSIS — N20.1 URETERAL CALCULUS: ICD-10-CM

## 2022-07-21 PROCEDURE — 74018 RADEX ABDOMEN 1 VIEW: CPT

## 2022-08-09 ENCOUNTER — OFFICE VISIT (OUTPATIENT)
Dept: UROLOGY | Age: 72
End: 2022-08-09
Payer: MEDICARE

## 2022-08-09 VITALS
SYSTOLIC BLOOD PRESSURE: 150 MMHG | DIASTOLIC BLOOD PRESSURE: 79 MMHG | HEART RATE: 69 BPM | HEIGHT: 68 IN | WEIGHT: 217 LBS | BODY MASS INDEX: 32.89 KG/M2 | TEMPERATURE: 97.3 F

## 2022-08-09 DIAGNOSIS — N20.0 KIDNEY STONES: Primary | ICD-10-CM

## 2022-08-09 PROCEDURE — 1123F ACP DISCUSS/DSCN MKR DOCD: CPT | Performed by: PHYSICIAN ASSISTANT

## 2022-08-09 PROCEDURE — G8417 CALC BMI ABV UP PARAM F/U: HCPCS | Performed by: PHYSICIAN ASSISTANT

## 2022-08-09 PROCEDURE — 1036F TOBACCO NON-USER: CPT | Performed by: PHYSICIAN ASSISTANT

## 2022-08-09 PROCEDURE — G8427 DOCREV CUR MEDS BY ELIG CLIN: HCPCS | Performed by: PHYSICIAN ASSISTANT

## 2022-08-09 PROCEDURE — 99213 OFFICE O/P EST LOW 20 MIN: CPT | Performed by: PHYSICIAN ASSISTANT

## 2022-08-09 PROCEDURE — 3017F COLORECTAL CA SCREEN DOC REV: CPT | Performed by: PHYSICIAN ASSISTANT

## 2022-08-09 PROCEDURE — 99211 OFF/OP EST MAY X REQ PHY/QHP: CPT

## 2022-08-09 RX ORDER — MELOXICAM 15 MG/1
TABLET ORAL
COMMUNITY
Start: 2022-07-11

## 2022-08-09 ASSESSMENT — ENCOUNTER SYMPTOMS
SHORTNESS OF BREATH: 0
EYE REDNESS: 0
ABDOMINAL PAIN: 0
COLOR CHANGE: 0
COUGH: 0
WHEEZING: 0
VOMITING: 0
BACK PAIN: 0
CONSTIPATION: 0
NAUSEA: 0

## 2022-08-09 NOTE — PROGRESS NOTES
HPI:      Patient is a 67 y.o. male in no acute distress. He is alert and oriented to person, place, and time. History  5/2021 Left ureteral stent placement    6/2021 Left HLL    Today  Patient is here today to follow-up for stones. He denies any flank or abdominal pain, dysuria or gross hematuria. He did have a KUB completed prior to today's visit. This was independently reviewed shows no evidence of  calcifications. He denies any spontaneous stone passage over the last year. He denies any new or worsening lower urinary tract symptoms.       Past Medical History:   Diagnosis Date    Arthritis     Hyperlipidemia     Kidney stone      Past Surgical History:   Procedure Laterality Date    BLADDER SURGERY Left 5/18/2021    CYSTOSCOPY   STENT INSERTION performed by Jacob Villegas MD at [address] [place] [address] Left 6/15/2021    CYSTOSCOPY STENT INSERTION/EXCHANGE performed by Jacob Villegas MD at [address] [place] [address] Right 11/22/2021    Dr. Xiao [facility] Left 5/18/2021    CYSTOSCOPY URETEROSCOPY performed by Jacob Villegas MD at [address] [place] [address],[address] Left 06/15/2021    Dr. Cyrus Russo, L stent insertion    CYSTOSCOPY Left 6/15/2021    CYSTOSCOPY URETEROSCOPY LASER, HLL performed by Jacob Villegas MD at [facility] Right 11/22/2021    EYE CATARACT EMULSIFICATION IOL IMPLANT performed by Erin Hunt DO at [facility] Left 12/6/2021    EYE CATARACT EMULSIFICATION IOL IMPLANT performed by Erin Hunt DO at [address] [facility] Left 07/12/2018    knee    FL TOTAL KNEE ARTHROPLASTY Left 7/12/2018    KNEE TOTAL ARTHROPLASTY performed by Navi Tristan MD at [facility] Encounter Medications as of 8/9/2022   Medication Sig Dispense Refill    meloxicam (MOBIC) 15 MG tablet TAKE 1 TABLET BY MOUTH ONCE DAILY       No facility-administered encounter medications on file as of 2022. Current Outpatient Medications on File Prior to Visit   Medication Sig Dispense Refill    meloxicam (MOBIC) 15 MG tablet TAKE 1 TABLET BY MOUTH ONCE DAILY       No current facility-administered medications on file prior to visit. Patient has no known allergies. No family history on file. Social History     Tobacco Use   Smoking Status Former    Types: Cigarettes    Quit date: 2000    Years since quittin.6   Smokeless Tobacco Never       Social History     Substance and Sexual Activity   Alcohol Use No       Review of Systems   Constitutional:  Negative for appetite change, chills and fever. Eyes:  Negative for redness and visual disturbance. Respiratory:  Negative for cough, shortness of breath and wheezing. Cardiovascular:  Negative for chest pain and leg swelling. Gastrointestinal:  Negative for abdominal pain, constipation, nausea and vomiting. Genitourinary:  Negative for decreased urine volume, difficulty urinating, dysuria, enuresis, flank pain, frequency, hematuria, penile discharge, penile pain, scrotal swelling, testicular pain and urgency. Positive for occasional nocturia, not bothersome   Musculoskeletal:  Negative for back pain, joint swelling and myalgias. Skin:  Negative for color change, rash and wound. Neurological:  Negative for dizziness, tremors and numbness. Hematological:  Negative for adenopathy. Does not bruise/bleed easily. BP (!) 150/79 (Site: Right Upper Arm, Position: Sitting, Cuff Size: Large Adult)   Pulse 69   Temp 97.3 °F (36.3 °C)   Ht 5' 8\" (1.727 m)   Wt 217 lb (98.4 kg)   BMI 32.99 kg/m²       PHYSICAL EXAM:  Constitutional: Patient in no acute distress; Neuro: alert and oriented to person place and time.     Psych: Mood and affect normal.  Lungs: Respiratory effort normal  Abdomen: Soft, non-tender, non-distended   Rectal: deferred       Lab Results   Component Value Date    BUN 17 05/17/2021     Lab Results   Component Value Date    CREATININE 1.69 (H) 05/17/2021     No results found for: PSA    ASSESSMENT:   Diagnosis Orders   1.  Kidney stones  XR ABDOMEN (KUB) (SINGLE AP VIEW)            PLAN:  STONE PREVENTION    To prevent future stone formation:    1) DO drink ~65-80 oz (2-2.5L) of water per day to stay adequately hydrated     2) AVOID/LIMIT intake of \"bad fluids\": soda/pop, coffee, tea, alcohol, energy drinks, any beverage with caffeine can act to dehydrate you       \"BAD FLUIDS\" DO NOT COUNT TOWARD THE 65-80oz of water    3) REDUCE consumption of sodium/salt - DO NOT salt your food, read labels (lunch meats, canned soups, prepared meals are high in salt), choose low salt options    4) DO NOT EAT animal protein/meat more than 2 meals a day - this includes red meat, pork, poultry and fish    Follow-up in 1 year with KUB

## 2022-08-09 NOTE — PATIENT INSTRUCTIONS
STONE PREVENTION    To prevent future stone formation:    1) DO drink ~65-80 oz (2-2.5L) of water per day to stay adequately hydrated     2) AVOID/LIMIT intake of \"bad fluids\": soda/pop, coffee, tea, alcohol, energy drinks, any beverage with caffeine can act to dehydrate you       \"BAD FLUIDS\" DO NOT COUNT TOWARD THE 65-80oz of water    3) REDUCE consumption of sodium/salt - DO NOT salt your food, read labels (lunch meats, canned soups, prepared meals are high in salt), choose low salt options    4) DO NOT EAT animal protein/meat more than 2 meals a day - this includes red meat, pork, poultry and fish    SURVEY:    You may be receiving a survey from [employer] regarding your visit today. Please complete the survey to enable us to provide the highest quality of care to you and your family. If you cannot score us a very good on any question, please call the office to discuss how we could have made your experience a very good one. Thank you.

## 2023-07-24 ENCOUNTER — HOSPITAL ENCOUNTER (OUTPATIENT)
Dept: PHYSICAL THERAPY | Age: 73
Setting detail: THERAPIES SERIES
Discharge: HOME | End: 2023-07-24
Payer: MEDICARE

## 2023-07-24 PROCEDURE — 97110 THERAPEUTIC EXERCISES: CPT

## 2023-07-24 PROCEDURE — 97162 PT EVAL MOD COMPLEX 30 MIN: CPT

## 2023-07-26 ENCOUNTER — HOSPITAL ENCOUNTER (OUTPATIENT)
Dept: PHYSICAL THERAPY | Age: 73
Setting detail: THERAPIES SERIES
Discharge: HOME | End: 2023-07-26
Payer: MEDICARE

## 2023-07-26 PROCEDURE — 97140 MANUAL THERAPY 1/> REGIONS: CPT

## 2023-07-26 PROCEDURE — 97110 THERAPEUTIC EXERCISES: CPT

## 2023-07-26 NOTE — PLAN OF CARE
[facility]           Phone: [phone]             Outpatient Physical Therapy  Fax: [phone]                                           Date: 2023  Patient: Halima Mendoza : 1950 [facility] #: [id number]   Referring Physician: Nidia Moreira MD      [x] Plan of Care   [] Updated Plan of Care    Dates of Service to Include: 2023 to 23    Diagnosis:  R TKA    Rehab (Treatment) Diagnosis:  s/p R TKA, R knee pain, generalized weakness             Onset Date:  23    Attendance  Total # of Visits to Date: 1 No Show: 0 Canceled Appointment: 0    Assessment  Body Structures, Functions, Activity Limitations Requiring Skilled Therapeutic Intervention: Decreased functional mobility , Decreased ADL status, Decreased ROM, Decreased body mechanics, Decreased tolerance to work activity, Decreased strength, Decreased endurance, Decreased balance, Decreased high-level IADLs, Increased pain, Decreased posture  Assessment: The patient is a 68 y.o. male who is 2.5 weeks s/p R TKA. He reports complications with his previous L TKA and also had his L knee manipulated to 90* of flexion. On exam he demonstrates severe ROM deficits (extension -6* and flexion 67*), decreased R LE strength, decreased activity endurance, and impaired gait pattern. He would benefit from skilled PT to address his deficits to improve functional mobility. Goals  Short Term Goals  Time Frame for Short Term Goals: 2 weeks  Short Term Goal 1: Patient will be initiated with a HEP  Short Term Goal 2: Patient will achieve 90* of knee flexion. Short Term Goal 3: Patient will tolerate manual interventions and modalities to improve ROM.   Long Term Goals  Time Frame for Long Term Goals : 4 weeks  Long Term Goal 1: Patient will be independent and compliant with a HEP  Long Term Goal 2: Patient will improve R knee ROM to 0-110* for

## 2023-07-26 NOTE — PROGRESS NOTES
Phone: [address]          Fax: [phone]                      Outpatient Physical Therapy                                                                      Evaluation  Date: 2023  Patient: Fallon Aburto  : 1950  [facility] #: [id number]    Referring Physician: Major Dsouza MD    Medical Diagnosis: R TKA    Treatment Diagnosis: s/p R TKA, R knee pain, generalized weakness  Onset Date: 23  PT Insurance Information: Medicare/Medical [place]  Total # of Visits Approved: 12   Total # of Visits to Date: 1  No Show: 0  Canceled Appointment: 0     Subjective  Subjective: The patient reports a R TKA on 2023. He reports a previous L TKA which required manipulation while he was under anesthesia for his R TKA and reports his L knee flexes to 90*. He reports R knee pain ranges from 3/10 to 8/10 at worst.  He had home health PT for the past two weeks and reports he has been struggling with R knee flexion ROM. He reports he is currently performing sink exercises as his HEP.   Additional Pertinent Hx: Hx of L TKA, OA      Ambulation/Gait (if applicable):   Ambulation  WB Status: WBAT  Ambulation  Surface: Level tile  Device: Rolling Walker  Assistance: Modified Independent  Gait Deviations: Decreased step length, Decreased step height  Distance: 25'  More Ambulation?: No  Stairs/Curb  Stairs?: No      Objective    AROM       AROM LLE (degrees)  L Knee Flexion (0-145): 90  L Knee Extension (0): 0  R knee flexion: 67  R knee extension: -6     Strength RLE  R Hip Flexion: 2+/5  R Hip Extension: NT  R Hip ABduction: 3+/5  R Knee Flexion: 3+/5  R Knee Extension: 3+/5  R Ankle Dorsiflexion: 5/5  Strength LLE  L Hip Flexion: 4/5  L Hip Extension: NT  L Hip ABduction: 3+/5  L Knee Flexion: 4+/5  L Knee Extension: 5/5  L Ankle Dorsiflexion: 5/5    Exercises:  Exercise 1: HEP: knee prop, seated knee flexion stretch or step stretch or heel slides 4x/day  Exercise 2:

## 2023-07-27 NOTE — PROGRESS NOTES
Phone: [address] [place]           Fax: [phone]                           Outpatient Physical Therapy                                                                            Daily Note    Patient: Magdy Fernandez : 1950  CSN #: [id number]   Referring Physician: Gadiel Ramos MD  Date: 2023    Diagnosis: R TKA  Treatment Diagnosis: s/p R TKA, R knee pain, generalized weakness    Onset Date: 23  PT Insurance Information: Medicare/Medical [place]  Total # of Visits Approved: 12 Per Physician Order  Total # of Visits to Date: 2  No Show: 0  Canceled Appointment: 0    23 Plan of Care/Recert Due    Pre-Treatment Pain:  4/10  Subjective: Pt states his knee feels really tight. Pain is 4/10 today. Exercises:  Exercise 1: HEP: knee prop, seated knee flexion stretch or step stretch or heel slides 4x/day  Exercise 2: SciFIT bike: level 1 x12 minutes    Manual:  Joint Mobilization: Patella mobs  Other: PROM of knee into flexion and extension    Modalities:   CP -10min     Assessment  Assessment: Pt with 4/10 pain  states his knee feels really tight. Bike prior to manual therapy. Static stretching in seated and supine position for flexion. Seated flexion 70-72 deg. Fair tolerance to extension stretch and mobs. HEP reviewed. Ice following session    Activity Tolerance  Activity Tolerance: Patient tolerated treatment well    Patient Education  Patient Education: HEP  Pt verbalized/demonstrated good understanding:     [x] Yes         [] No, pt required further clarification. Post Treatment Pain:  4/10      Plan  Plan Frequency: 3  Plan weeks: 4       Goals  (Total # of Visits to Date: 2)      Short Term Goals  Time Frame for Short Term Goals: 2 weeks  Short Term Goal 1: Patient will be initiated with a HEP  Short Term Goal 2: Patient will achieve 90* of knee flexion.   Short Term Goal 3: Patient will tolerate manual interventions and modalities to improve

## 2023-07-28 ENCOUNTER — HOSPITAL ENCOUNTER (OUTPATIENT)
Dept: PHYSICAL THERAPY | Age: 73
Setting detail: THERAPIES SERIES
Discharge: HOME | End: 2023-07-28
Payer: MEDICARE

## 2023-07-28 PROCEDURE — 97110 THERAPEUTIC EXERCISES: CPT

## 2023-07-28 PROCEDURE — G0283 ELEC STIM OTHER THAN WOUND: HCPCS

## 2023-07-28 PROCEDURE — 97140 MANUAL THERAPY 1/> REGIONS: CPT

## 2023-07-31 ENCOUNTER — HOSPITAL ENCOUNTER (OUTPATIENT)
Dept: PHYSICAL THERAPY | Age: 73
Setting detail: THERAPIES SERIES
Discharge: HOME | End: 2023-07-31
Payer: MEDICARE

## 2023-07-31 PROCEDURE — 97110 THERAPEUTIC EXERCISES: CPT

## 2023-07-31 PROCEDURE — 97140 MANUAL THERAPY 1/> REGIONS: CPT

## 2023-08-01 ENCOUNTER — OFFICE VISIT (OUTPATIENT)
Dept: PRIMARY CARE CLINIC | Age: 73
End: 2023-08-01
Payer: MEDICARE

## 2023-08-01 VITALS
OXYGEN SATURATION: 98 % | SYSTOLIC BLOOD PRESSURE: 124 MMHG | HEART RATE: 100 BPM | WEIGHT: 205.8 LBS | RESPIRATION RATE: 16 BRPM | BODY MASS INDEX: 31.29 KG/M2 | DIASTOLIC BLOOD PRESSURE: 86 MMHG | TEMPERATURE: 98.7 F

## 2023-08-01 DIAGNOSIS — Z76.89 ENCOUNTER TO ESTABLISH CARE: Primary | ICD-10-CM

## 2023-08-01 DIAGNOSIS — E11.9 TYPE 2 DIABETES MELLITUS WITHOUT COMPLICATION, WITHOUT LONG-TERM CURRENT USE OF INSULIN (HCC): ICD-10-CM

## 2023-08-01 DIAGNOSIS — Z13.220 LIPID SCREENING: ICD-10-CM

## 2023-08-01 DIAGNOSIS — Z12.11 COLON CANCER SCREENING: ICD-10-CM

## 2023-08-01 PROCEDURE — G8427 DOCREV CUR MEDS BY ELIG CLIN: HCPCS | Performed by: NURSE PRACTITIONER

## 2023-08-01 PROCEDURE — 3078F DIAST BP <80 MM HG: CPT | Performed by: NURSE PRACTITIONER

## 2023-08-01 PROCEDURE — 3017F COLORECTAL CA SCREEN DOC REV: CPT | Performed by: NURSE PRACTITIONER

## 2023-08-01 PROCEDURE — 3074F SYST BP LT 130 MM HG: CPT | Performed by: NURSE PRACTITIONER

## 2023-08-01 PROCEDURE — 1123F ACP DISCUSS/DSCN MKR DOCD: CPT | Performed by: NURSE PRACTITIONER

## 2023-08-01 PROCEDURE — 99204 OFFICE O/P NEW MOD 45 MIN: CPT | Performed by: NURSE PRACTITIONER

## 2023-08-01 PROCEDURE — 3046F HEMOGLOBIN A1C LEVEL >9.0%: CPT | Performed by: NURSE PRACTITIONER

## 2023-08-01 PROCEDURE — 2022F DILAT RTA XM EVC RTNOPTHY: CPT | Performed by: NURSE PRACTITIONER

## 2023-08-01 PROCEDURE — G8417 CALC BMI ABV UP PARAM F/U: HCPCS | Performed by: NURSE PRACTITIONER

## 2023-08-01 PROCEDURE — 1036F TOBACCO NON-USER: CPT | Performed by: NURSE PRACTITIONER

## 2023-08-01 RX ORDER — METFORMIN HYDROCHLORIDE 500 MG/1
500 TABLET, EXTENDED RELEASE ORAL
Qty: 90 TABLET | Refills: 1 | Status: SHIPPED | OUTPATIENT
Start: 2023-08-01

## 2023-08-01 RX ORDER — OXYCODONE HYDROCHLORIDE 5 MG/1
TABLET ORAL
COMMUNITY
Start: 2023-07-27

## 2023-08-01 SDOH — ECONOMIC STABILITY: FOOD INSECURITY: WITHIN THE PAST 12 MONTHS, THE FOOD YOU BOUGHT JUST DIDN'T LAST AND YOU DIDN'T HAVE MONEY TO GET MORE.: NEVER TRUE

## 2023-08-01 SDOH — ECONOMIC STABILITY: HOUSING INSECURITY
IN THE LAST 12 MONTHS, WAS THERE A TIME WHEN YOU DID NOT HAVE A STEADY PLACE TO SLEEP OR SLEPT IN A SHELTER (INCLUDING NOW)?: NO

## 2023-08-01 SDOH — ECONOMIC STABILITY: INCOME INSECURITY: HOW HARD IS IT FOR YOU TO PAY FOR THE VERY BASICS LIKE FOOD, HOUSING, MEDICAL CARE, AND HEATING?: NOT HARD AT ALL

## 2023-08-01 SDOH — ECONOMIC STABILITY: FOOD INSECURITY: WITHIN THE PAST 12 MONTHS, YOU WORRIED THAT YOUR FOOD WOULD RUN OUT BEFORE YOU GOT MONEY TO BUY MORE.: NEVER TRUE

## 2023-08-01 ASSESSMENT — PATIENT HEALTH QUESTIONNAIRE - PHQ9
SUM OF ALL RESPONSES TO PHQ QUESTIONS 1-9: 0
SUM OF ALL RESPONSES TO PHQ QUESTIONS 1-9: 0
SUM OF ALL RESPONSES TO PHQ9 QUESTIONS 1 & 2: 0
1. LITTLE INTEREST OR PLEASURE IN DOING THINGS: 0
2. FEELING DOWN, DEPRESSED OR HOPELESS: 0
SUM OF ALL RESPONSES TO PHQ QUESTIONS 1-9: 0
SUM OF ALL RESPONSES TO PHQ QUESTIONS 1-9: 0

## 2023-08-01 NOTE — PATIENT INSTRUCTIONS
SURVEY:     You may be receiving a survey from [employer] regarding your visit today. Please complete the survey to enable us to provide the highest quality of care to you and your family. If you cannot score us a very good on any question, please call the office to discuss how we could have made your experience a very good one.      Thank you,    Jamar Mart, APRN-CHARLES Trejo, APRN-CNP  Marely Holland, COLBY Lyon, CMA  Jayde Walters, CMA  Larissa, CMA  Vickie, PCA  Elena, PM

## 2023-08-01 NOTE — PROGRESS NOTES
Phone: [address]           Fax: [phone]                           Outpatient Physical Therapy                                                                            Daily Note    Patient: Becca Brandt : 1950  [facility] #: [id number]   Referring Physician: Rebekah Evangelista MD  Date: 2023       Treatment Diagnosis: s/p R TKA, R knee pain, generalized weakness    Onset Date: 23  PT Insurance Information: Medicare/Medical [place]  Total # of Visits Approved: 12 Per Physician Order  Total # of Visits to Date: 4  No Show: 0  Canceled Appointment: 0    23 Plan of Care/Recert Due    Pre-Treatment Pain:  4/10  Subjective: Pt reports hes really been working hard and stretching at home all weekend. Pt states his knee is sore but hes been taking his pain meds    Exercises:  Exercise 1: HEP: knee prop, seated knee flexion stretch or step stretch or heel slides 4x/day  Exercise 2: SciFIT bike: level 3 x10 minutes  Exercise 3: step stretch 3x30  Exercise 4: FSU 4 inch 10x  Exercise 5: seated knee stretch 3x30  Exercise 6: supine heel prop 4 mins    Manual:  Other: PROM of knee into flexion and extension    Modalities:   CP 10 mins post therapy for discomfort. Assessment  Assessment: Step stretch 61* today and supine with overpressure 71* flexion today with empty end feel d/t pain. Pt amb with toe out, flexed knee around clinic. Encouraged Pt to continue to focus on stretching at home for greater ROM. Activity Tolerance  Activity Tolerance: Patient tolerated treatment well    Patient Education  Patient Education: HEP  Pt verbalized/demonstrated good understanding:     [x] Yes         [] No, pt required further clarification.      Post Treatment Pain:  4/10    Plan  Plan Frequency: 3  Plan weeks: 4     Goals  (Total # of Visits to Date: 4)      Short Term Goals  Time Frame for Short Term Goals: 2 weeks  Short Term Goal 1: Patient will be initiated with a

## 2023-08-01 NOTE — PROGRESS NOTES
Content: Thought content normal.         Judgment: Judgment normal.       Data:     Lab Results   Component Value Date/Time     05/17/2021 06:51 PM    K 4.1 05/17/2021 06:51 PM     05/17/2021 06:51 PM    CO2 26 05/17/2021 06:51 PM    BUN 17 05/17/2021 06:51 PM    CREATININE 1.69 05/17/2021 06:51 PM    GLUCOSE 138 05/17/2021 06:51 PM    GLUCOSE 101 11/03/2011 07:11 AM    PROT 6.5 05/17/2021 06:51 PM    LABALBU 4.2 05/17/2021 06:51 PM    LABALBU 4.2 11/03/2011 07:11 AM    BILITOT 0.66 05/17/2021 06:51 PM    ALKPHOS 85 05/17/2021 06:51 PM    AST 25 05/17/2021 06:51 PM    ALT 35 05/17/2021 06:51 PM     Lab Results   Component Value Date/Time    WBC 12.7 05/18/2021 05:55 AM    RBC 3.32 05/18/2021 05:55 AM    HGB 10.7 05/18/2021 05:55 AM    HCT 32.7 05/18/2021 05:55 AM    MCV 98.5 05/18/2021 05:55 AM    MCH 32.2 05/18/2021 05:55 AM    MCHC 32.7 05/18/2021 05:55 AM    RDW 14.6 05/18/2021 05:55 AM     05/18/2021 05:55 AM    MPV 9.8 05/18/2021 05:55 AM     Lab Results   Component Value Date/Time    TSH 2.20 03/24/2016 06:59 AM     Lab Results   Component Value Date/Time    CHOL 189 03/24/2016 06:59 AM    HDL 37 03/24/2016 06:59 AM       Assessment/Plan:      Diagnosis Orders   1. Encounter to establish care        2. Type 2 diabetes mellitus without complication, without long-term current use of insulin (HCC)  metFORMIN (GLUCOPHAGE-XR) 500 MG extended release tablet    Microalbumin / Creatinine Urine Ratio      3. Colon cancer screening  Fecal DNA Colorectal cancer screening (Cologuard)      4. Lipid screening  Lipid Panel        Start Metformin 500 mg XR daily. Recommend lifestyle modifications. Labs and preventative screenings ordered and will call with results and further recommendations. 1.  Addis Renetta received counseling on the following healthy behaviors: nutrition, exercise, and medication adherence  2. Patient given educational materials - see patient instructions  3.   Was a self-tracking

## 2023-08-02 ENCOUNTER — HOSPITAL ENCOUNTER (OUTPATIENT)
Dept: PHYSICAL THERAPY | Age: 73
Setting detail: THERAPIES SERIES
Discharge: HOME | End: 2023-08-02
Payer: MEDICARE

## 2023-08-02 PROCEDURE — 97110 THERAPEUTIC EXERCISES: CPT

## 2023-08-02 PROCEDURE — 97140 MANUAL THERAPY 1/> REGIONS: CPT

## 2023-08-02 ASSESSMENT — ENCOUNTER SYMPTOMS
EYES NEGATIVE: 1
RESPIRATORY NEGATIVE: 1
GASTROINTESTINAL NEGATIVE: 1
ALLERGIC/IMMUNOLOGIC NEGATIVE: 1

## 2023-08-02 NOTE — PROGRESS NOTES
Phone: [address]           Fax: [phone]                           Outpatient Physical Therapy                                                                            Daily Note    Patient: Sorin Julien : 1950  [facility] #: [id number]   Referring Physician: Lakeisha Kay MD  Date: 2023    Diagnosis: R TKA  Treatment Diagnosis: s/p R TKA, R knee pain, generalized weakness    Onset Date: 23  PT Insurance Information: Medicare/[employer] [place]  Total # of Visits Approved: 12 Per Physician Order  Total # of Visits to Date: 5  No Show: 0  Canceled Appointment: 0    23 Plan of Care/Recert Due    Pre-Treatment Pain:  4/10  Subjective: Pt with moderate pain. States his knee remains very tight    Exercises:  Exercise 2: SciFIT bike: level 3 x10 minutes  Exercise 3: step stretch 3x30  Exercise 4: FSU 6 inch 10x  Exercise 5: seated knee stretch 3x30  Exercise 6: supine heel prop 4 mins    Manual:  Other: PROM of knee into flexion and extension--long statis holds    Modalities:   ICE 15 min     Assessment  Assessment: Pain is moderate. Passive seated knee flexion 75 deg . following static stretching. Swelling is moderate. Pt states he is stretching 4x/day. Ice following session for pain    Activity Tolerance  Activity Tolerance: Patient tolerated treatment well    Patient Education  Patient Education: HEP  Pt verbalized/demonstrated good understanding:     [x] Yes         [] No, pt required further clarification. Post Treatment Pain:  4/10      Plan  Plan Frequency: 3  Plan weeks: 4       Goals  (Total # of Visits to Date: 5)      Short Term Goals  Time Frame for Short Term Goals: 2 weeks  Short Term Goal 1: Patient will be initiated with a HEP -MET  Short Term Goal 2: Patient will achieve 90* of knee flexion. -Progressing  Short Term Goal 3: Patient will tolerate manual interventions and modalities to improve ROM.  -MET    Long Term Goals  Time Frame

## 2023-08-04 ENCOUNTER — HOSPITAL ENCOUNTER (OUTPATIENT)
Dept: GENERAL RADIOLOGY | Age: 73
End: 2023-08-04
Payer: MEDICARE

## 2023-08-04 ENCOUNTER — HOSPITAL ENCOUNTER (OUTPATIENT)
Age: 73
End: 2023-08-04
Payer: MEDICARE

## 2023-08-04 ENCOUNTER — HOSPITAL ENCOUNTER (OUTPATIENT)
Dept: PHYSICAL THERAPY | Age: 73
Setting detail: THERAPIES SERIES
Discharge: HOME | End: 2023-08-04
Payer: MEDICARE

## 2023-08-04 DIAGNOSIS — N20.0 KIDNEY STONES: ICD-10-CM

## 2023-08-04 PROCEDURE — 97110 THERAPEUTIC EXERCISES: CPT

## 2023-08-04 PROCEDURE — 74018 RADEX ABDOMEN 1 VIEW: CPT

## 2023-08-04 PROCEDURE — 97140 MANUAL THERAPY 1/> REGIONS: CPT

## 2023-08-07 ENCOUNTER — HOSPITAL ENCOUNTER (OUTPATIENT)
Dept: PHYSICAL THERAPY | Age: 73
Setting detail: THERAPIES SERIES
Discharge: HOME | End: 2023-08-07
Payer: MEDICARE

## 2023-08-07 PROCEDURE — 97110 THERAPEUTIC EXERCISES: CPT

## 2023-08-07 PROCEDURE — 97140 MANUAL THERAPY 1/> REGIONS: CPT

## 2023-08-08 ENCOUNTER — OFFICE VISIT (OUTPATIENT)
Dept: UROLOGY | Age: 73
End: 2023-08-08
Payer: MEDICARE

## 2023-08-08 VITALS
HEIGHT: 68 IN | DIASTOLIC BLOOD PRESSURE: 69 MMHG | HEART RATE: 91 BPM | TEMPERATURE: 97.5 F | WEIGHT: 204 LBS | BODY MASS INDEX: 30.92 KG/M2 | SYSTOLIC BLOOD PRESSURE: 122 MMHG

## 2023-08-08 DIAGNOSIS — N20.0 KIDNEY STONES: Primary | ICD-10-CM

## 2023-08-08 DIAGNOSIS — Z12.5 SCREENING PSA (PROSTATE SPECIFIC ANTIGEN): ICD-10-CM

## 2023-08-08 PROCEDURE — 99213 OFFICE O/P EST LOW 20 MIN: CPT | Performed by: NURSE PRACTITIONER

## 2023-08-08 PROCEDURE — 3017F COLORECTAL CA SCREEN DOC REV: CPT | Performed by: NURSE PRACTITIONER

## 2023-08-08 PROCEDURE — G8427 DOCREV CUR MEDS BY ELIG CLIN: HCPCS | Performed by: NURSE PRACTITIONER

## 2023-08-08 PROCEDURE — 3078F DIAST BP <80 MM HG: CPT | Performed by: NURSE PRACTITIONER

## 2023-08-08 PROCEDURE — 1123F ACP DISCUSS/DSCN MKR DOCD: CPT | Performed by: NURSE PRACTITIONER

## 2023-08-08 PROCEDURE — 1036F TOBACCO NON-USER: CPT | Performed by: NURSE PRACTITIONER

## 2023-08-08 PROCEDURE — 3074F SYST BP LT 130 MM HG: CPT | Performed by: NURSE PRACTITIONER

## 2023-08-08 PROCEDURE — G8417 CALC BMI ABV UP PARAM F/U: HCPCS | Performed by: NURSE PRACTITIONER

## 2023-08-08 RX ORDER — ACETAMINOPHEN 500 MG
500 TABLET ORAL EVERY 6 HOURS PRN
COMMUNITY

## 2023-08-08 ASSESSMENT — ENCOUNTER SYMPTOMS
BACK PAIN: 0
NAUSEA: 0
ABDOMINAL PAIN: 0
VOMITING: 0
WHEEZING: 0
CONSTIPATION: 0
COLOR CHANGE: 0
EYE REDNESS: 0
SHORTNESS OF BREATH: 0
COUGH: 0

## 2023-08-08 NOTE — PATIENT INSTRUCTIONS
SURVEY:    You may be receiving a survey from [employer] regarding your visit today. Please complete the survey to enable us to provide the highest quality of care to you and your family. If you cannot score us a very good on any question, please call the office to discuss how we could have made your experience a very good one. Thank you.

## 2023-08-08 NOTE — PROGRESS NOTES
Phone: [address] [facility]           Fax: [phone]                           Outpatient Physical Therapy                                                                            Daily Note    Patient: Halima Mendoza : 1950  CSN #: [id number]   Referring Physician: Nidia Moreira MD  Date: 2023    Diagnosis: R TKA  Treatment Diagnosis: s/p R TKA, R knee pain, generalized weakness    Onset Date: 23  PT Insurance Information: Medicare/Medical [place]  Total # of Visits Approved: 12 Per Physician Order  Total # of Visits to Date: 7  No Show: 0  Canceled Appointment: 0    23 Plan of Care/Recert Due    Pre-Treatment Pain:  3/10  Subjective: Pt with 3/10 pain. States he is trying to stretch his knee daily    Exercises:  Exercise 2: SciFIT bike: level 3 x10 minutes  Exercise 3: step stretch 3x30  Exercise 4: FSU 6 inch 10x    Manual:  Soft Tissue Mobilizaton: Distal quads  Other: PROM of knee into flexion and extension--long static holds    Modalities:   ICE 10 min     Assessment  Assessment: Ther ex limited today, main focus was PROM to R knee. Pt with 80 deg flexion following long static stretching in supine position. Marck jimenez tolerated well. Reviewed home stretching with good understanding    Activity Tolerance  Activity Tolerance: Patient tolerated treatment well    Patient Education  Patient Education: Home stretching  Pt verbalized/demonstrated good understanding:     [x] Yes         [] No, pt required further clarification. Post Treatment Pain:  3/10      Plan  Plan Frequency: 3  Plan weeks: 4       Goals  (Total # of Visits to Date: 7)      Short Term Goals  Time Frame for Short Term Goals: 2 weeks  Short Term Goal 1: Patient will be initiated with a HEP -MET  Short Term Goal 2: Patient will achieve 90* of knee flexion. -Progressing  Short Term Goal 3: Patient will tolerate manual interventions and modalities to improve ROM.  -MET    Long Term

## 2023-08-09 ENCOUNTER — HOSPITAL ENCOUNTER (OUTPATIENT)
Dept: PHYSICAL THERAPY | Age: 73
Setting detail: THERAPIES SERIES
Discharge: HOME | End: 2023-08-09
Payer: MEDICARE

## 2023-08-09 PROCEDURE — 97110 THERAPEUTIC EXERCISES: CPT

## 2023-08-09 PROCEDURE — 97140 MANUAL THERAPY 1/> REGIONS: CPT

## 2023-08-09 NOTE — PROGRESS NOTES
Patient will improve R knee ROM to 0-110* for ADLs. Long Term Goal 3: Patient will improve R LE strength go >/= 4/5 in all major joints and planes  Long Term Goal 4: Patient will be able to ambulate community distances without an AD with minimal gait deviations  Long Term Goal 5: Patient will report 70% improvement in overall symptoms and function.     Minutes Tracking:  Time In: 6578  Time Out: 1510  Minutes: 55  Timed Code Treatment Minutes: 54 Minutes        Ryan Waters     Date: 8/9/2023

## 2023-08-11 ENCOUNTER — HOSPITAL ENCOUNTER (OUTPATIENT)
Dept: PHYSICAL THERAPY | Age: 73
Setting detail: THERAPIES SERIES
Discharge: HOME | End: 2023-08-11
Payer: MEDICARE

## 2023-08-11 PROCEDURE — 97110 THERAPEUTIC EXERCISES: CPT

## 2023-08-11 PROCEDURE — 97140 MANUAL THERAPY 1/> REGIONS: CPT

## 2023-08-11 NOTE — PROGRESS NOTES
Phone: [address] [facility]           Fax: [phone]                           Outpatient Physical Therapy                                                                            Daily Note    Patient: Yarelis Cervantes : 1950  [facility] #: [id number]   Referring Physician: Ashley Man MD  Date: 2023     Treatment Diagnosis: s/p R TKA, R knee pain, generalized weakness    Onset Date: 23  PT Insurance Information: Medicare/Medical [place]  Total # of Visits Approved: 12 Per Physician Order  Total # of Visits to Date: 9  No Show: 0  Canceled Appointment: 0      Plan of Care/Recert Due    Pre-Treatment Pain:  0/10  Pt reports his knee is just so stiff. Pt states he wishes he could just get his knee manip done    Exercises:  Exercise 2: SciFIT bike: level 3 x10 minutes  Exercise 3: step stretch 3x30  Exercise 5: seated knee stretch 3x30  Exercise 7: TG squats lvl 5 ( for flexion) 10x  Exercise 8: startrac kickout 10x2 30 sec flexion stretch    Manual:  Other: PROM of knee into flexion and extension--long static holds    Modalities:   CP 10 mins post there-ex for discomfort. Assessment  Assessment: Focused on manual stretching along with exercises to increased knee ROM. FLexion remains 72* today in supine and 80* in sititng. Activity Tolerance  Activity Tolerance: Patient tolerated treatment well    Patient Education  Patient Education: HEP  Pt verbalized/demonstrated good understanding:     [x] Yes         [] No, pt required further clarification. Post Treatment Pain:  0/10    Plan  Plan Frequency: 3  Plan weeks: 4     Goals  (Total # of Visits to Date: 5)      Short Term Goals  Time Frame for Short Term Goals: 2 weeks  Short Term Goal 1: Patient will be initiated with a HEP -MET  Short Term Goal 2: Patient will achieve 90* of knee flexion. -Progressing  Short Term Goal 3: Patient will tolerate manual interventions and modalities to improve ROM.

## 2023-08-14 ENCOUNTER — HOSPITAL ENCOUNTER (OUTPATIENT)
Dept: PHYSICAL THERAPY | Age: 73
Setting detail: THERAPIES SERIES
Discharge: HOME | End: 2023-08-14
Payer: MEDICARE

## 2023-08-14 PROCEDURE — 97140 MANUAL THERAPY 1/> REGIONS: CPT

## 2023-08-14 PROCEDURE — 97110 THERAPEUTIC EXERCISES: CPT

## 2023-08-14 NOTE — PLAN OF CARE
[facility]           Phone: [phone]             Outpatient Physical Therapy  Fax: [phone]                                           Date: 2023  Patient: Tony Meléndez : 1950 CSN #: [id number]   Referring Physician: Sarai Freitas MD      [] Plan of Care   [x] Updated Plan of Care    Dates of Service to Include: 2023 to 09/15/23    Diagnosis:  R TKA    Rehab (Treatment) Diagnosis:  s/p R TKA, R knee pain, generalized weakness             Onset Date:  23    Attendance  Total # of Visits to Date: 10 No Show: 0 Canceled Appointment: 0    Assessment  Assessment: Patient has attended 10 PT visits for R TKA and has met goals for initiating HEP and functional ROM/strengthening exercises and is making slow progress toward goals for improved ROM. Current PROM s/p stretching is 80* with therapist overpressure. Patient is able to walk short distances with no AD; uses [facility] for longer distance ambulation as needed. Patient to benefit from continued physical therapy 3x/wk for up to 4 more weeks to meet remaining goals and return to PLOF. Goals  Short Term Goals  Time Frame for Short Term Goals: 2 weeks  Short Term Goal 1: Patient will be initiated with a HEP -MET  Short Term Goal 2: Patient will achieve 90* of knee flexion. -Progressing  Short Term Goal 3: Patient will tolerate manual interventions and modalities to improve ROM. -MET  Long Term Goals  Time Frame for Long Term Goals : 4 weeks  Long Term Goal 1: Patient will be independent and compliant with a HEP  Long Term Goal 2: Patient will improve R knee ROM to 0-110* for ADLs.   Long Term Goal 3: Patient will improve R LE strength go >/= 4/5 in all major joints and planes-progressing  Long Term Goal 4: Patient will be able to ambulate community distances without an AD with minimal gait deviations-progressing  Long Term Goal 5: Patient will

## 2023-08-14 NOTE — PROGRESS NOTES
Phone: [address] [place]           Fax: [phone]                           Outpatient Physical Therapy                                                                            Daily Note    Patient: Larry Suárez : 1950  [facility] #: [id number]   Referring Physician: Lianne Wagner MD  Date: 2023    Diagnosis: R TKA  Treatment Diagnosis: s/p R TKA, R knee pain, generalized weakness    Onset Date: 23  PT Insurance Information: Medicare/Medical [place]  Total # of Visits Approved: 20 Per Physician Order  Total # of Visits to Date: 10  No Show: 0  Canceled Appointment: 0    09/15/23 Plan of Care/Recert Due    Pre-Treatment Pain:  0/10  Subjective: Pt states hsi knee remains very stiff. States he's stretching sveral times a day    Exercises:  Exercise 2: SciFIT bike: level 3 x10 minutes  Exercise 3: step stretch 3x30    Manual:  Other: PROM of knee into flexion and extension--long static holds    Modalities:   ICE 10 min     Assessment  Assessment: Pt seated flexion following session was 82 deg. Contract relax tech, long static holds performed to try and improve flexion range. Strength limited due to lack of knee ROM. Reviewed HEP with good understanding    Activity Tolerance  Activity Tolerance: Patient tolerated treatment well    Patient Education  Patient Education: HEP  Pt verbalized/demonstrated good understanding:     [x] Yes         [] No, pt required further clarification. Post Treatment Pain:  0/10      Plan  Plan Frequency: 3  Plan weeks: 4       Goals  (Total # of Visits to Date: 8)      Short Term Goals  Time Frame for Short Term Goals: 2 weeks  Short Term Goal 1: Patient will be initiated with a HEP -MET  Short Term Goal 2: Patient will achieve 90* of knee flexion. -Progressing  Short Term Goal 3: Patient will tolerate manual interventions and modalities to improve ROM.  -MET    Long Term Goals  Time Frame for Long Term Goals : 4 weeks  Long Term
Female

## 2023-08-16 ENCOUNTER — HOSPITAL ENCOUNTER (OUTPATIENT)
Dept: PHYSICAL THERAPY | Age: 73
Setting detail: THERAPIES SERIES
Discharge: HOME | End: 2023-08-16
Payer: MEDICARE

## 2023-08-16 ENCOUNTER — TELEPHONE (OUTPATIENT)
Dept: PRIMARY CARE CLINIC | Age: 73
End: 2023-08-16

## 2023-08-16 LAB — NONINV COLON CA DNA+OCC BLD SCRN STL QL: NEGATIVE

## 2023-08-16 PROCEDURE — 97140 MANUAL THERAPY 1/> REGIONS: CPT

## 2023-08-16 PROCEDURE — 97110 THERAPEUTIC EXERCISES: CPT

## 2023-08-16 NOTE — TELEPHONE ENCOUNTER
----- Message from [address] [facility] SHEYLA Xie - CNP sent at 8/16/2023  7:35 AM EDT -----  Results are normal, please call patient and make them aware.

## 2023-08-16 NOTE — PROGRESS NOTES
Phone: [address]           Fax: [phone]                           Outpatient Physical Therapy                                                                            Daily Note    Patient: Mani Mcdonnell : 1950  [facility] #: [id number]   Referring Physician: Aissatou Ward MD  Date: 2023    Diagnosis: R TKA  Treatment Diagnosis: s/p R TKA, R knee pain, generalized weakness    Onset Date: 23  PT Insurance Information: Medicare/Medical [place]  Total # of Visits Approved: 20 Per Physician Order  Total # of Visits to Date: 11  No Show: 0  Canceled Appointment: 0    09/15/23 Plan of Care/Recert Due    Pre-Treatment Pain:  0/10  Subjective: Pt states he's not getting much sllep due to tightness in his knee    Exercises:  Exercise 2: SciFIT bike: level 3 x10 minutes  Exercise 3: step stretch 3x30  Exercise 4: FSU 6 inch 10x  Exercise 8: startrac kickout 10x2 30 sec flexion stretch    Manual:  Other: PROM of knee into flexion and extension--long static holds    Modalities:   ICE 10 min     Assessment  Assessment: PROM remians about the same. Pt is tolerated stretching and mobs a little better. Pt states he is stretching several times a day at home. Ice following session    Activity Tolerance  Activity Tolerance: Patient tolerated treatment well    Patient Education  Patient Education: HEP  Pt verbalized/demonstrated good understanding:     [x] Yes         [] No, pt required further clarification. Post Treatment Pain:  0/10      Plan  Plan Frequency: 3  Plan weeks: 4       Goals  (Total # of Visits to Date: 6)      Short Term Goals  Time Frame for Short Term Goals: 2 weeks  Short Term Goal 1: Patient will be initiated with a HEP -MET  Short Term Goal 2: Patient will achieve 90* of knee flexion. -Progressing  Short Term Goal 3: Patient will tolerate manual interventions and modalities to improve ROM.  -MET    Long Term Goals  Time Frame for Long Term

## 2023-08-18 ENCOUNTER — APPOINTMENT (OUTPATIENT)
Dept: PHYSICAL THERAPY | Age: 73
End: 2023-08-18
Payer: MEDICARE

## 2023-08-18 ENCOUNTER — HOSPITAL ENCOUNTER (OUTPATIENT)
Dept: PHYSICAL THERAPY | Age: 73
Setting detail: THERAPIES SERIES
Discharge: HOME | End: 2023-08-18
Payer: MEDICARE

## 2023-08-18 PROCEDURE — 97140 MANUAL THERAPY 1/> REGIONS: CPT

## 2023-08-18 PROCEDURE — 97110 THERAPEUTIC EXERCISES: CPT

## 2023-08-21 ENCOUNTER — HOSPITAL ENCOUNTER (OUTPATIENT)
Dept: PHYSICAL THERAPY | Age: 73
Setting detail: THERAPIES SERIES
Discharge: HOME | End: 2023-08-21
Payer: MEDICARE

## 2023-08-21 PROCEDURE — 97140 MANUAL THERAPY 1/> REGIONS: CPT

## 2023-08-21 PROCEDURE — 97110 THERAPEUTIC EXERCISES: CPT

## 2023-08-21 NOTE — PROGRESS NOTES
Phone: [address] [facility]           Fax: [phone]                           Outpatient Physical Therapy                                                                            Daily Note    Patient: Jessica Goldberg : 1950  CSN #: [id number]   Referring Physician: David Solorzano MD  Date: 2023    Diagnosis: R TKA  Treatment Diagnosis: s/p R TKA, R knee pain, generalized weakness    Onset Date: 23  PT Insurance Information: Medicare/Medical [place]  Total # of Visits Approved: 20 Per Physician Order  Total # of Visits to Date: 13  No Show: 0  Canceled Appointment: 0    09/15/23 Plan of Care/Recert Due    Pre-Treatment Pain:  3/10  Subjective: Pt states his knee feels really stiff. States his knee feels like it burns. Pain in the back of his kness as well. Pt states he see's the Dr next Thursday    Exercises:  Exercise 2: SciFIT bike: level 3 x10 minutes  Exercise 3: step stretch 3x30  Exercise 4: FSU 6 inch 10x  Exercise 5: seated knee stretch 3x30  Exercise 8: startrac kickout 10x2 30 sec flexion stretch    Manual:  Other: PROM of knee into flexion and extension--long static holds    Modalities:   CP 10 min     Assessment  Assessment: Pt states his knee feels likes it burns sometimes. Pt states his knee feels very stiff. Pt completed bike for warm up and just wanted manual stretching. Static stretching and contract relax tech to assisit with increasing ROM. Ice following session    Activity Tolerance  Activity Tolerance: Patient tolerated treatment well    Patient Education     Pt verbalized/demonstrated good understanding:     [x] Yes         [] No, pt required further clarification.        Post Treatment Pain:  3/10      Plan  Plan Frequency: 3  Plan weeks: 4       Goals  (Total # of Visits to Date: 15)      Short Term Goals  Time Frame for Short Term Goals: 2 weeks  Short Term Goal 1: Patient will be initiated with a HEP -MET  Short Term Goal 2: Patient

## 2023-08-23 ENCOUNTER — HOSPITAL ENCOUNTER (OUTPATIENT)
Dept: PHYSICAL THERAPY | Age: 73
Setting detail: THERAPIES SERIES
Discharge: HOME | End: 2023-08-23
Payer: MEDICARE

## 2023-08-23 PROCEDURE — 97110 THERAPEUTIC EXERCISES: CPT

## 2023-08-23 PROCEDURE — 97140 MANUAL THERAPY 1/> REGIONS: CPT

## 2023-08-24 NOTE — PROGRESS NOTES
Phone: [address]           Fax: [phone]                           Outpatient Physical Therapy                                                                            Daily Note    Patient: Dalila Mariee : 1950  CSN #: [id number]   Referring Physician: Rusty Leone MD  Date: 2023    Diagnosis: R TKA  Treatment Diagnosis: s/p R TKA, R knee pain, generalized weakness    Onset Date: 23  PT Insurance Information: Medicare/Medical [place]  Total # of Visits Approved: 20 Per Physician Order  Total # of Visits to Date: 14  No Show: 0  Canceled Appointment: 0    09/15/23 Plan of Care/Recert Due    Pre-Treatment Pain:  4/10  Subjective: Pt reports with a pain level of 4/10. states knee is not burning much today. Continues with marked stiffness    Exercises:  Exercise 2: SciFIT bike: level 3 x10 minutes  Exercise 4: FSU 6 inch 10x    Manual:  Other: PROM of knee into flexion and extension--long static holds         Assessment  Assessment: Pt ROM remains about the same 80-85 deg following passive stretching. Pt is to see his Dr tomorrow. Activity Tolerance  Activity Tolerance: Patient tolerated treatment well    Patient Education  Patient Education: HEP  Pt verbalized/demonstrated good understanding:     [x] Yes         [] No, pt required further clarification. Post Treatment Pain:  /10      Plan  Plan Frequency: 3  Plan weeks: 4       Goals  (Total # of Visits to Date: 15)      Short Term Goals  Time Frame for Short Term Goals: 2 weeks  Short Term Goal 1: Patient will be initiated with a HEP -MET  Short Term Goal 2: Patient will achieve 90* of knee flexion. -Progressing  Short Term Goal 3: Patient will tolerate manual interventions and modalities to improve ROM.  -MET    Long Term Goals  Time Frame for Long Term Goals : 4 weeks  Long Term Goal 1: Patient will be independent and compliant with a HEP  Long Term Goal 2: Patient will improve R knee

## 2023-08-25 ENCOUNTER — HOSPITAL ENCOUNTER (OUTPATIENT)
Dept: PHYSICAL THERAPY | Age: 73
Setting detail: THERAPIES SERIES
Discharge: HOME | End: 2023-08-25
Payer: MEDICARE

## 2023-08-28 ENCOUNTER — APPOINTMENT (OUTPATIENT)
Dept: PHYSICAL THERAPY | Age: 73
End: 2023-08-28
Payer: MEDICARE

## 2023-08-30 ENCOUNTER — HOSPITAL ENCOUNTER (OUTPATIENT)
Dept: PHYSICAL THERAPY | Age: 73
Setting detail: THERAPIES SERIES
Discharge: HOME | End: 2023-08-30
Payer: MEDICARE

## 2023-08-30 PROCEDURE — 97110 THERAPEUTIC EXERCISES: CPT

## 2023-08-30 PROCEDURE — 97140 MANUAL THERAPY 1/> REGIONS: CPT

## 2023-08-30 PROCEDURE — G0283 ELEC STIM OTHER THAN WOUND: HCPCS

## 2023-08-30 NOTE — PLAN OF CARE
4  [x] HP/CP      [x] Electrical Stim   [x] Therapeutic Exercise      [x] Gait Training  [] Aquatics   [] Ultrasound         [x] Patient Education/HEP   [x] Manual Therapy  [] Traction    [x] Neuro-juan manuel        [x] Soft Tissue Mobs            [x] Therapeutic Activity  [] Iontophoresis    [] Orthotic casting/fitting      [] Dry Needling  [] Blood Flow Restriction             Electronically signed by: Yared Rowell PT, DPT    Date: 8/30/2023      ______________________________________ Date: 8/30/2023   Physician Signature

## 2023-08-30 NOTE — PROGRESS NOTES
Term Goals  Time Frame for Long Term Goals : 4 weeks  Long Term Goal 1: Patient will be independent and compliant with a HEP  Long Term Goal 2: Patient will improve R knee ROM to 0-110* for ADLs. Long Term Goal 3: Patient will improve R LE strength go >/= 4/5 in all major joints and planes-progressing  Long Term Goal 4: Patient will be able to ambulate community distances without an AD with minimal gait deviations-progressing  Long Term Goal 5: Patient will report 70% improvement in overall symptoms and function.     Minutes Tracking:  Time In: 1448  Time Out: [address] [facility]  Minutes: 64           Orpah Bamberger AdelspergerPTA     Date: 8/30/2023

## 2023-08-31 ENCOUNTER — HOSPITAL ENCOUNTER (OUTPATIENT)
Dept: PHYSICAL THERAPY | Age: 73
Setting detail: THERAPIES SERIES
Discharge: HOME | End: 2023-08-31
Payer: MEDICARE

## 2023-08-31 PROCEDURE — 97110 THERAPEUTIC EXERCISES: CPT

## 2023-08-31 PROCEDURE — 97140 MANUAL THERAPY 1/> REGIONS: CPT

## 2023-08-31 PROCEDURE — G0283 ELEC STIM OTHER THAN WOUND: HCPCS

## 2023-08-31 NOTE — PROGRESS NOTES
Phone: [address] [facility]           Fax: [phone]                           Outpatient Physical Therapy                                                                            Daily Note    Patient: Jessica Goldberg : 1950  CSN #: [id number]   Referring Physician: David Solorzano MD  Date: 2023    Diagnosis: R TKA  Treatment Diagnosis: s/p R TKA, R knee pain, generalized weakness    Onset Date: 23  PT Insurance Information: Medicare/Medical [place]  Total # of Visits Approved: 32 Per Physician Order  Total # of Visits to Date: 16  No Show: 0  Canceled Appointment: 0    23 Plan of Care/Recert Due    Pre-Treatment Pain:  1/10  Subjective: Pta states his pain is mimimal. States he stretched every 3 hours yesterday    Exercises:  Exercise 2: SciFIT bike: level 3 x13 minutes    Manual:  Other: PROM of knee into flexion and extension--long static holds    Modalities:   IFC/cp for pain     Assessment  Assessment: Pain is minimal. bike, and PROM in seated position. Flexion remains 80 deg following static stretch. IFC/cp follwing session. Activity Tolerance  Activity Tolerance: Patient tolerated treatment well    Patient Education  Patient Education: HEP  Pt verbalized/demonstrated good understanding:     [x] Yes         [] No, pt required further clarification. Post Treatment Pain:  1/10      Plan  Plan Frequency: 7  Plan weeks: 4       Goals  (Total # of Visits to Date: 12)      Short Term Goals  Time Frame for Short Term Goals: 2 weeks  Short Term Goal 1: Patient will be initiated with a HEP -MET  Short Term Goal 2: Patient will achieve 90* of knee flexion. -Progressing  Short Term Goal 3: Patient will tolerate manual interventions and modalities to improve ROM.  -MET    Long Term Goals  Time Frame for Long Term Goals : 4 weeks  Long Term Goal 1: Patient will be independent and compliant with a HEP  Long Term Goal 2: Patient will improve R knee ROM to

## 2023-09-01 ENCOUNTER — HOSPITAL ENCOUNTER (OUTPATIENT)
Dept: PHYSICAL THERAPY | Age: 73
Setting detail: THERAPIES SERIES
Discharge: HOME | End: 2023-09-01
Payer: MEDICARE

## 2023-09-01 PROCEDURE — G0283 ELEC STIM OTHER THAN WOUND: HCPCS

## 2023-09-01 PROCEDURE — 97140 MANUAL THERAPY 1/> REGIONS: CPT

## 2023-09-01 PROCEDURE — 97110 THERAPEUTIC EXERCISES: CPT

## 2023-09-01 NOTE — PROGRESS NOTES
Phone: [address] [place]           Fax: [phone]                           Outpatient Physical Therapy                                                                            Daily Note    Patient: Letitia Clifton : 1950  CSN #: [id number]   Referring Physician: Judie Byrnes MD  Date: 2023     Treatment Diagnosis: s/p R TKA, R knee pain, generalized weakness    Onset Date: 23  PT Insurance Information: Medicare/Medical [place]  Total # of Visits Approved: 28 Per Physician Order  Total # of Visits to Date: 17  No Show: 0  Canceled Appointment: 0    23 Plan of Care/Recert Due    Pre-Treatment Pain:  0/10  Subjective: Pt reports he is just very stiff. Pt denies current pain. Exercises:  Exercise 2: SciFIT bike: level 3 x9.0 minutes  Exercise 5: seated knee stretch 3x30 overpressure  Exercise 7: TG squats lvl 5 ( for flexion) 10x  Exercise 9: seated ball bounce and rolls 4 mins  Exercise 10: prone knee flex (strap) 10x 25 sec    Manual:  Other: PROM of knee into flexion and extension--long static holds    Modalities:   IFC with CP 15 mins for discomfort    Assessment  Assessment: R knee flexion PROM 80*. Focused on PROM, AAROM for greater ROM. Activity Tolerance  Activity Tolerance: Patient tolerated treatment well    Patient Education  Patient Education: HEP  Pt verbalized/demonstrated good understanding:     [x] Yes         [] No, pt required further clarification. Post Treatment Pain:  0/10    Plan  Plan Frequency: 7  Plan weeks: 4     Goals  (Total # of Visits to Date: 16)      Short Term Goals  Time Frame for Short Term Goals: 2 weeks  Short Term Goal 1: Patient will be initiated with a HEP -MET  Short Term Goal 2: Patient will achieve 90* of knee flexion. -Progressing  Short Term Goal 3: Patient will tolerate manual interventions and modalities to improve ROM.  -MET    Long Term Goals  Time Frame for Long Term Goals : 4 weeks  Long Term

## 2023-09-02 ENCOUNTER — HOSPITAL ENCOUNTER (OUTPATIENT)
Dept: PHYSICAL THERAPY | Age: 73
Setting detail: THERAPIES SERIES
Discharge: HOME | End: 2023-09-02
Payer: MEDICARE

## 2023-09-02 PROCEDURE — 97110 THERAPEUTIC EXERCISES: CPT

## 2023-09-02 NOTE — PROGRESS NOTES
Phone: [address]           Fax: [phone]                           Outpatient Physical Therapy                                                                            Daily Note    Patient: Tony Meléndez : 1950  CSN #: [id number]   Referring Physician: Sarai Freitas MD  Date: 2023    Diagnosis: R TKA  Treatment Diagnosis: s/p R TKA, R knee pain, generalized weakness    Onset Date: 23  PT Insurance Information: Medicare/Medical [place]  Total # of Visits Approved: 32 Per Physician Order  Total # of Visits to Date: 18  No Show: 0  Canceled Appointment: 0    23 Plan of Care/Recert Due    Pre-Treatment Pain:  0/10  Subjective: Pt arrives with reports of no pain just stiffness and tightness. [facility]    Exercises:  Exercise 5: seated knee stretch 10 x 10 second holds overpressure  Exercise 11: quad set roll out on foam roll 3 x 15  Exercise 12: quad stretch over edge of plinth 3 x 30 seconds ; 10 x 10 second hold over pressure  Exercise 13: sit to stand 3 x 10      Assessment  Body Structures, Functions, Activity Limitations Requiring Skilled Therapeutic Intervention: Decreased functional mobility , Decreased ADL status, Decreased ROM, Decreased body mechanics, Decreased tolerance to work activity, Decreased strength, Decreased endurance, Decreased balance, Decreased high-level IADLs, Increased pain, Decreased posture  Assessment: Pt arrived without pain, reporting continued stiffness. Therapist directed pt through mdified routine in order to facilitate right knee mobility and range of motion. Therapist applied overpressure throughout treatment for additional stretch. New exercises added per activity log with good tolerance by pt. Therapist inspected right knee incision. Grade 2 pitting edema noted on lateral aspect knee down the shaft of the lateral shin.  Pt ended treatment without

## 2023-09-03 ENCOUNTER — HOSPITAL ENCOUNTER (OUTPATIENT)
Dept: PHYSICAL THERAPY | Age: 73
Setting detail: THERAPIES SERIES
Discharge: HOME | End: 2023-09-03
Payer: MEDICARE

## 2023-09-03 PROCEDURE — 97116 GAIT TRAINING THERAPY: CPT

## 2023-09-03 PROCEDURE — 97110 THERAPEUTIC EXERCISES: CPT

## 2023-09-03 NOTE — PROGRESS NOTES
Phone: [phone] [facility]           Fax: [phone]                           Outpatient Physical Therapy                                                                            Daily Note    Patient: Paulette Marina : 1950  CSN #: [id number]   Referring Physician: Bessie Duarte MD  Date: 9/3/2023       Treatment Diagnosis: s/p R TKA, R knee pain, generalized weakness    Onset Date: 23  PT Insurance Information: Medicare/Medical [place]  Total # of Visits Approved: 28 Per Physician Order  Total # of Visits to Date: 19  No Show: 0  Canceled Appointment: 0    23 Plan of Care/Recert Due    Pre-Treatment Pain:  1/10  Subjective: Pt denies pain only stiffness along IT band and quad. Pt reports being in the pool yesterday, giving him temporary relief of pain and tightness. Exercises:  Exercise 1: HEP: knee prop, seated knee flexion stretch or step stretch or heel slides 4x/day  Exercise 3: step stretch 3x30  Exercise 5: seated knee stretch 10 x 10 second holds overpressure  Exercise 11: quad set roll out on foam roll 3 x 15  Exercise 12: quad stretch over edge of plinth 3 x 30 seconds ; 10 x 10 second hold over pressure  Exercise 13: sit to stand 3 x 10  Exercise 14: ambulation 150ft with no AD    Assessment  Assessment: continued modified treatment to improve knee mobility and ROM with fair tolerance. Overpressure applied throughout treatment to encourage flexion. PTA blocked pt's R foot during sit to stand to progress flexion ROM. PTA educated pt on knee flexion during ambulation to return to normal gait pattern as pt demo's hip circumduction intermittently. Activity Tolerance  Activity Tolerance: Patient tolerated treatment well, Patient limited by pain    Patient Education   Pt verbalized/demonstrated good understanding:     [x] Yes         [] No, pt required further clarification.        Post Treatment Pain:  1/10      Plan  Plan Frequency: 7  Plan

## 2023-09-04 ENCOUNTER — HOSPITAL ENCOUNTER (OUTPATIENT)
Dept: PHYSICAL THERAPY | Age: 73
Setting detail: THERAPIES SERIES
Discharge: HOME | End: 2023-09-04
Payer: MEDICARE

## 2023-09-04 PROCEDURE — 97140 MANUAL THERAPY 1/> REGIONS: CPT

## 2023-09-04 PROCEDURE — 97110 THERAPEUTIC EXERCISES: CPT

## 2023-09-04 NOTE — PROGRESS NOTES
Phone: [phone]           Fax: [phone]                           Outpatient Physical Therapy                                                                            Daily Note    Patient: Sarah Melgoza : 1950  CSN #: [id number]   Referring Physician: Erica Darby MD  Date: 2023       Treatment Diagnosis: s/p R TKA, R knee pain, generalized weakness    Onset Date: 23  PT Insurance Information: Medicare/Medical [place]  Total # of Visits Approved: 28 Per Physician Order  Total # of Visits to Date: 20  No Show: 0  Canceled Appointment: 0    23 Plan of Care/Recert Due    Pre-Treatment Pain:  3/10  Subjective: Pt reports increased pain over patella and IT band this date. Pt discouraged d/t increased pain and unable to increase ROM. Pt had restless night last night, presents with fatigue. Exercises:  Exercise 1: HEP: knee prop, seated knee flexion stretch or step stretch or heel slides 4x/day  Exercise 3: step stretch 3x30  Exercise 4: FSU 6 inch 10x  Exercise 6: seated heel prop 3 mins  Exercise 13: sit to stand 3 x 10  Exercise 14: ambulation 150ft with no AD - pt used SC intermittently  Exercise 15: HS curls on wheeled chair    Manual:  Soft Tissue Mobilizaton: Distal quads/scar mobilization; scrapping to quad/IT band to reduce tissue adhesions, swelling, and pain    Assessment  Assessment: Continued with modified treatment to improve knee mobility and ROM with fair tolerance. Trialed scrapping to quad/IT band to reduce tissue adhesions, swelling, and pain with fair tolerance. will continue. Activity Tolerance  Activity Tolerance: Patient tolerated treatment well, Patient limited by pain    Patient Education   Pt verbalized/demonstrated good understanding:     [x] Yes         [] No, pt required further clarification.        Post Treatment Pain:  3/10      Plan  Plan Frequency: 7  Plan weeks: 4       Goals  (Total # of Visits to Date:

## 2023-09-05 ENCOUNTER — HOSPITAL ENCOUNTER (OUTPATIENT)
Dept: PHYSICAL THERAPY | Age: 73
Setting detail: THERAPIES SERIES
Discharge: HOME | End: 2023-09-05
Payer: MEDICARE

## 2023-09-05 PROCEDURE — 97110 THERAPEUTIC EXERCISES: CPT

## 2023-09-05 PROCEDURE — 97140 MANUAL THERAPY 1/> REGIONS: CPT

## 2023-09-05 NOTE — PROGRESS NOTES
Phone: [address]           Fax: [phone]                           Outpatient Physical Therapy                                                                            Daily Note    Patient: Sarah Melgoza : 1950  [facility] #: [id number]   Referring Physician: Erica Darby MD  Date: 2023     Treatment Diagnosis: s/p R TKA, R knee pain, generalized weakness    Onset Date: 23  PT Insurance Information: Medicare/Medical [place]  Total # of Visits Approved: 32 Per Physician Order  Total # of Visits to Date: 21  No Show: 0  Canceled Appointment: 0    23 Plan of Care/Recert Due    Pre-Treatment Pain:  3/10  Subjective: Pt reports hes had increased quad discomfort still but leg is still so tight. Pt rates ucrrent pain a -3/10. Exercises:  Exercise 2: SciFIT bike: level 3 x9.0 minutes  Exercise 3: step stretch 3x30  Exercise 6: seated heel prop 3 mins  Exercise 9: seated ball bounce and rolls 4 mins  Exercise 10: prone knee flex (strap) 10x 25 sec  Exercise 12: quad stretch over edge of plinth 3 x 30 seconds ; 10 x 10 second hold over pressure  Exercise 13: sit to stand 3 x 10    Manual:  Other: PROM of knee into flexion and extension--long static holds    Modalities:   CP 10 mins for discomfort. Assessment  Assessment: R knee flexion in supine 76* with overpressure. Point tenderness noted throughout R quads. Activity Tolerance  Activity Tolerance: Patient tolerated treatment well, Patient limited by pain    Patient Education  Patient Education: Continue stretching. Pt verbalized/demonstrated good understanding:     [x] Yes         [] No, pt required further clarification.      Post Treatment Pain:  10    Plan  Plan Frequency: 7  Plan weeks: 4     Goals  (Total # of Visits to Date: 24)      Short Term Goals  Time Frame for Short Term Goals: 2 weeks  Short Term Goal 1: Patient will be initiated with a HEP -MET  Short Term Goal 2: Patient

## 2023-09-06 ENCOUNTER — HOSPITAL ENCOUNTER (OUTPATIENT)
Dept: PHYSICAL THERAPY | Age: 73
Setting detail: THERAPIES SERIES
Discharge: HOME | End: 2023-09-06
Payer: MEDICARE

## 2023-09-06 PROCEDURE — 97140 MANUAL THERAPY 1/> REGIONS: CPT

## 2023-09-06 PROCEDURE — 97110 THERAPEUTIC EXERCISES: CPT

## 2023-09-07 ENCOUNTER — HOSPITAL ENCOUNTER (OUTPATIENT)
Dept: PHYSICAL THERAPY | Age: 73
Setting detail: THERAPIES SERIES
Discharge: HOME | End: 2023-09-07
Payer: MEDICARE

## 2023-09-07 PROCEDURE — 97110 THERAPEUTIC EXERCISES: CPT

## 2023-09-07 PROCEDURE — 97140 MANUAL THERAPY 1/> REGIONS: CPT

## 2023-09-07 NOTE — PROGRESS NOTES
Phone: [address] [place]           Fax: [phone]                           Outpatient Physical Therapy                                                                            Daily Note    Patient: Jaron Love : 1950  CSN #: [id number]   Referring Physician: Courtney Quintanilla MD  Date: 2023    Diagnosis: R TKA  Treatment Diagnosis: s/p R TKA, R knee pain, generalized weakness    Onset Date: 23  PT Insurance Information: Medicare/Medical [place]  Total # of Visits Approved: 32 Per Physician Order  Total # of Visits to Date: 22  No Show: 0  Canceled Appointment: 0    23 Plan of Care/Recert Due    Pre-Treatment Pain:  3/10  Subjective: Pt pain is about the same. States he's getting discouraged with progress    Exercises:  Exercise 2: SciFIT bike: level 3 x9.0 minutes  Exercise 6: seated heel prop 3 mins  Exercise 7: TG squats lvl 5 ( for flexion) 10x    Manual:  Joint Mobilization: Patella mobs, AP tib fib mobilizations grade III/IV  Other: PROM of knee into flexion and extension--long static holds    Modalities:   CP 10 min    Assessment  Assessment: Pt pain is 3/10. Was able to stretch pt in the seated and prone position today. Added prone hangs to HEP for extensionn range. Flexion 80 deg at end of session    Activity Tolerance  Activity Tolerance: Patient tolerated treatment well    Patient Education  Patient Education: HEP  Pt verbalized/demonstrated good understanding:     [x] Yes         [] No, pt required further clarification. Post Treatment Pain:  3/10      Plan  Plan Frequency: 7  Plan weeks: 4       Goals  (Total # of Visits to Date: 25)      Short Term Goals  Time Frame for Short Term Goals: 2 weeks  Short Term Goal 1: Patient will be initiated with a HEP -MET  Short Term Goal 2: Patient will achieve 90* of knee flexion. -Progressing  Short Term Goal 3: Patient will tolerate manual interventions and modalities to improve ROM.

## 2023-09-07 NOTE — PROGRESS NOTES
Phone: [address] [place]           Fax: [phone]                           Outpatient Physical Therapy                                                                            Daily Note    Patient: Jody Feliz : 1950  [facility] #: [id number]   Referring Physician: Rosa M Berman MD  Date: 2023    Diagnosis: R TKA  Treatment Diagnosis: s/p R TKA, R knee pain, generalized weakness    Onset Date: 23  PT Insurance Information: Medicare/Medical [place]  Total # of Visits Approved: 24 Per Physician Order  Total # of Visits to Date:   No Show: 0  Canceled Appointment: 0    23 Plan of Care/Recert Due    Pre-Treatment Pain:  3/10  Subjective: Pt arrives with reports of no change    Exercises:  Exercise 2: SciFIT bike: level 3 x 13 mintues , progressive move seat closer  Exercise 6: seated heel prop 3 mins  Exercise 8: double knee to chest on stability ball  Exercise 12: quad stretch over edge of plinth 3 x 30 seconds ; 10 x 10 second hold over pressure    Manual: 15 minutes - soft tissue mobilization for tissue extensibility and swelling reduction        Assessment  Body Structures, Functions, Activity Limitations Requiring Skilled Therapeutic Intervention: Decreased functional mobility , Decreased ADL status, Decreased ROM, Decreased body mechanics, Decreased tolerance to work activity, Decreased strength, Decreased endurance, Decreased balance, Decreased high-level IADLs, Increased pain, Decreased posture  Assessment: Pt arrived with usual pain. Therapist completed manual therapy for soft tissue mobilization and scar tissue mobilization in order to facilitate tissue extensibility and reduce swelling. Pitting edema noted on lateral aspect of the right knee. Therapist continued to direct pt through exercises in order to facilitate mobility of the right knee with fair tolerance from pt.  Will continue to progress as able for general right knee

## 2023-09-08 ENCOUNTER — HOSPITAL ENCOUNTER (OUTPATIENT)
Dept: PHYSICAL THERAPY | Age: 73
Setting detail: THERAPIES SERIES
Discharge: HOME | End: 2023-09-08
Payer: MEDICARE

## 2023-09-08 PROCEDURE — 97140 MANUAL THERAPY 1/> REGIONS: CPT

## 2023-09-08 PROCEDURE — 97110 THERAPEUTIC EXERCISES: CPT

## 2023-09-09 ENCOUNTER — HOSPITAL ENCOUNTER (OUTPATIENT)
Dept: PHYSICAL THERAPY | Age: 73
Setting detail: THERAPIES SERIES
Discharge: HOME | End: 2023-09-09
Payer: MEDICARE

## 2023-09-09 PROCEDURE — 97140 MANUAL THERAPY 1/> REGIONS: CPT

## 2023-09-09 PROCEDURE — 97110 THERAPEUTIC EXERCISES: CPT

## 2023-09-09 NOTE — PROGRESS NOTES
Phone: [address]           Fax: [phone]                           Outpatient Physical Therapy                                                                            Daily Note    Patient: Aureliano Michelle : 1950  CSN #: [id number]   Referring Physician: Narinder Caldera MD  Date: 2023    Diagnosis: R TKA  Treatment Diagnosis: s/p R TKA, R knee pain, generalized weakness    Onset Date: 23  PT Insurance Information: Medicare/Medical [place]  Total # of Visits Approved: 24 Per Physician Order  Total # of Visits to Date: 25  No Show: 0  Canceled Appointment: 0    23 Plan of Care/Recert Due    Pre-Treatment Pain:  5/10  Subjective: Pt arrives stating he has the 5/10 pain back and the tightness remains the same. Pt state he needs a shortened session due to his son coming to visit    Exercises:  Exercise 2: SciFIT bike: level 3 x 13 mintues , progressive move seat closer  Exercise 3: step stretch 3x30  Exercise 5: seated knee stretch 10 x 10 second holds overpressure  Exercise 12: quad stretch over edge of plinth 3 x 30 seconds ; 10 x 10 second hold over pressure  Exercise 15: HS curls on wheeled chair    Manual: 10 minutes - STM to right knee for swelling reduction, scar tissue mobilization        Assessment  Body Structures, Functions, Activity Limitations Requiring Skilled Therapeutic Intervention: Decreased functional mobility , Decreased ADL status, Decreased ROM, Decreased body mechanics, Decreased tolerance to work activity, Decreased strength, Decreased endurance, Decreased balance, Decreased high-level IADLs, Increased pain, Decreased posture  Assessment: Pt arrived with reports of moderate pain. Therapist directed pt through treatment in order to improve right knee mobility and reduce tightness. Therapist completed manual therapy and soft tissue mobilization in order to facilitate swelling reduction and reduce tissue tightness.  Pt tolerated

## 2023-09-10 ENCOUNTER — HOSPITAL ENCOUNTER (OUTPATIENT)
Dept: PHYSICAL THERAPY | Age: 73
Setting detail: THERAPIES SERIES
Discharge: HOME | End: 2023-09-10
Payer: MEDICARE

## 2023-09-10 PROCEDURE — 97140 MANUAL THERAPY 1/> REGIONS: CPT

## 2023-09-10 PROCEDURE — 97110 THERAPEUTIC EXERCISES: CPT

## 2023-09-10 NOTE — PROGRESS NOTES
Phone: [address] [place]           Fax: [phone]                           Outpatient Physical Therapy                                                                            Daily Note    Patient: Sarah Melgoza : 1950  [facility] #: [id number]   Referring Physician: Erica Darby MD  Date: 9/10/2023       Treatment Diagnosis: s/p R TKA, R knee pain, generalized weakness    Onset Date: 23  PT Insurance Information: Medicare/Medical [place]  Total # of Visits Approved: 24 Per Physician Order  Total # of Visits to Date: 26      Pre-Treatment Pain:  4/10  Subjective: Patient reports 4/10 pain coming into therapy today. He reports he got to 80* of flexion last visit. Exercises:  Exercise 3: step stretch 4x30  Exercise 5: seated knee stretch 10 x 10 second holds overpressure  Exercise 12: quad stretch over edge of plinth 3 x 30 seconds ; 10 x 10 second hold over pressure  Exercise 15: HS curls on wheeled chair    Manual:  Joint Mobilization: Patella mobs, AP tib fib mobilizations grade III/IV  Soft Tissue Mobilizaton: Distal quads/scar mobilization; scrapping to quad/IT band to reduce tissue adhesions, swelling, and pain cupping today with 1 cup/ sliding. Other: PROM of knee into flexion and extension--long static holds    Assessment  Body Structures, Functions, Activity Limitations Requiring Skilled Therapeutic Intervention: Decreased functional mobility , Decreased ADL status, Decreased ROM, Decreased body mechanics, Decreased tolerance to work activity, Decreased strength, Decreased endurance, Decreased balance, Decreased high-level IADLs, Increased pain, Decreased posture  Assessment: Continued with manual interventions to attempt to reduce soft tissue restrictions. Patient with poor patella mobility. Continued to manually stretch patient to his end range. He was able to achieve 82* in supine with leg off the edge of the plinth.     Activity

## 2023-09-11 ENCOUNTER — HOSPITAL ENCOUNTER (OUTPATIENT)
Dept: PHYSICAL THERAPY | Age: 73
Setting detail: THERAPIES SERIES
Discharge: HOME | End: 2023-09-11
Payer: MEDICARE

## 2023-09-11 PROCEDURE — 97110 THERAPEUTIC EXERCISES: CPT

## 2023-09-11 PROCEDURE — 97140 MANUAL THERAPY 1/> REGIONS: CPT

## 2023-09-11 NOTE — PROGRESS NOTES
Phone: [address] [place]           Fax: [phone]                           Outpatient Physical Therapy                                                                            Daily Note    Patient: Ed De Paz : 1950  [facility] #: [id number]   Referring Physician: Nithya Hoyt MD  Date: 2023    Diagnosis: R TKA  Treatment Diagnosis: s/p R TKA, R knee pain, generalized weakness    Onset Date: 23  PT Insurance Information: Medicare/Medical [place]  Total # of Visits Approved: 24 Per Physician Order  Total # of Visits to Date: 27  No Show: 0  Canceled Appointment: 0      Plan of Care/Recert Due    Pre-Treatment Pain:  3/10  Subjective: Pt states pain is a little better. Pt still has pain medial knee    Exercises:  Exercise 2: SciFIT bike: level 3 x 13 mintues , progressive move seat closer  Exercise 3: step stretch 4x30  Exercise 4: FSU 6 inch 10x  Exercise 7: TG squats lvl 5 ( for flexion) 10x    Manual:  Other: PROM of knee into flexion and extension--long static holds         Assessment  Assessment: Pt states pain is  alittle less. Exercise and passive stretching tolerated well. Pt stretched in prone and seated positions today. Pt flexion 80 deg. Will continue as planned    Activity Tolerance  Activity Tolerance: Patient tolerated treatment well    Patient Education  Patient Education: HEP  Pt verbalized/demonstrated good understanding:     [x] Yes         [] No, pt required further clarification. Post Treatment Pain:  2/10      Plan  Plan Frequency: 7  Plan weeks: 4       Goals  (Total # of Visits to Date: 32)      Short Term Goals  Time Frame for Short Term Goals: 2 weeks  Short Term Goal 1: Patient will be initiated with a HEP -MET  Short Term Goal 2: Patient will achieve 90* of knee flexion. -Progressing (2023 Right knee flexion = 81 degrees)  Short Term Goal 3: Patient will tolerate manual interventions and modalities to improve ROM. Pt to ER via [place] [facility] for lethargy and sob, Pt was to get his chemo therapy today for his gastric cancer and was unable due to his sob and weakness. Pt tachycardic upon arrival. Normotensive, spo2 100% on RA.

## 2023-09-12 ENCOUNTER — HOSPITAL ENCOUNTER (OUTPATIENT)
Dept: PHYSICAL THERAPY | Age: 73
Setting detail: THERAPIES SERIES
Discharge: HOME | End: 2023-09-12
Payer: MEDICARE

## 2023-09-12 PROCEDURE — 97110 THERAPEUTIC EXERCISES: CPT

## 2023-09-12 PROCEDURE — 97140 MANUAL THERAPY 1/> REGIONS: CPT

## 2023-09-12 NOTE — PROGRESS NOTES
Phone: [address] [place]           Fax: [phone]                           Outpatient Physical Therapy                                                                            Daily Note    Patient: Dalila Mariee : 1950  CSN #: [id number]   Referring Physician: Rusty Leone MD  Date: 2023    Diagnosis: R TKA  Treatment Diagnosis: s/p R TKA, R knee pain, generalized weakness    Onset Date: 23  PT Insurance Information: Medicare/Medical [place]  Total # of Visits Approved: 24 Per Physician Order  Total # of Visits to Date: 28  No Show: 0  Canceled Appointment: 0    23 Plan of Care/Recert Due    Pre-Treatment Pain:  2/10  Subjective: Pt reports he is tired today, Pt states his pain is 2/10. Exercises:  Exercise 3: step stretch 4x30  Exercise 4: FSU 6 inch 10x  Exercise 7: TG squats lvl 5 ( for flexion) 10x    Manual:  Joint Mobilization: Patella mobs,  Other: PROM of knee into flexion and extension--long static holds       Assessment  Assessment: Pt with 82 deg seated flexion following static stretching. Pt feeling a little tired today. Will continue as planned    Activity Tolerance  Activity Tolerance: Patient tolerated treatment well    Patient Education  Patient Education: HEP  Pt verbalized/demonstrated good understanding:     [x] Yes         [] No, pt required further clarification. Post Treatment Pain:  2/10      Plan  Plan Frequency: 7  Plan weeks: 4       Goals  (Total # of Visits to Date: 29)      Short Term Goals  Time Frame for Short Term Goals: 2 weeks  Short Term Goal 1: Patient will be initiated with a HEP -MET  Short Term Goal 2: Patient will achieve 90* of knee flexion. -Progressing (2023 Right knee flexion = 81 degrees)  Short Term Goal 3: Patient will tolerate manual interventions and modalities to improve ROM.  -MET    Long Term Goals  Time Frame for Long Term Goals : 4 weeks  Long Term Goal 1: Patient will be

## 2023-09-13 ENCOUNTER — HOSPITAL ENCOUNTER (OUTPATIENT)
Dept: PHYSICAL THERAPY | Age: 73
Setting detail: THERAPIES SERIES
Discharge: HOME | End: 2023-09-13
Payer: MEDICARE

## 2023-09-13 PROCEDURE — 97110 THERAPEUTIC EXERCISES: CPT

## 2023-09-13 PROCEDURE — 97140 MANUAL THERAPY 1/> REGIONS: CPT

## 2023-09-14 ENCOUNTER — HOSPITAL ENCOUNTER (OUTPATIENT)
Dept: PHYSICAL THERAPY | Age: 73
Setting detail: THERAPIES SERIES
Discharge: HOME | End: 2023-09-14
Payer: MEDICARE

## 2023-09-14 PROCEDURE — 97140 MANUAL THERAPY 1/> REGIONS: CPT

## 2023-09-14 PROCEDURE — 97110 THERAPEUTIC EXERCISES: CPT

## 2023-09-14 NOTE — PROGRESS NOTES
Phone: [phone] [facility]           Fax: [phone]                           Outpatient Physical Therapy                                                                            Daily Note    Patient: hSira Rolle : 1950  CSN #: [id number]   Referring Physician: Dillan Walker MD  Date: 2023    Diagnosis: R TKA  Treatment Diagnosis: s/p R TKA, R knee pain, generalized weakness    Onset Date: 23  PT Insurance Information: Medicare/Medical [place]  Total # of Visits Approved: 24 Per Physician Order  Total # of Visits to Date: 30  No Show: 0  Canceled Appointment: 0      Plan of Care/Recert Due    Pre-Treatment Pain:  4/10  Subjective: Pt saw his Dr today, states they will take more in October on possibly another manipulation    Exercises:  Exercise 2: SciFIT bike: level 3 x 13 mintues , progressive move seat closer  Exercise 3: step stretch 4x30  Exercise 4: FSU 6 inch 10x    Manual:  Other: PROM of knee into flexion and extension--long static holds-- seated position       Assessment  Assessment: Pt is a little sore today. Had a long ride in the care from seeing his Dr. Bradley Bennett stretchingwith fair tolerance. Strength in current range is 4/4+/5. Activity Tolerance  Activity Tolerance: Patient tolerated treatment well    Patient Education  Patient Education: HEP  Pt verbalized/demonstrated good understanding:     [x] Yes         [] No, pt required further clarification. Post Treatment Pain:  4/10      Plan  Plan Frequency: 7  Plan weeks: 4       Goals  (Total # of Visits to Date: 27)      Short Term Goals  Time Frame for Short Term Goals: 2 weeks  Short Term Goal 1: Patient will be initiated with a HEP -MET  Short Term Goal 2: Patient will achieve 90* of knee flexion. -Progressing (2023 Right knee flexion = 81 degrees)  Short Term Goal 3: Patient will tolerate manual interventions and modalities to improve ROM.  -MET    Long Term Goals  Time

## 2023-09-14 NOTE — PROGRESS NOTES
Phone: Joana [facility]           Fax: [phone]                           Outpatient Physical Therapy                                                                            Daily Note    Patient: Dalila Mariee : 1950  [facility] #: [id number]   Referring Physician: Rusty Leone MD  Date: 2023    Diagnosis: R TKA  Treatment Diagnosis: s/p R TKA, R knee pain, generalized weakness    Onset Date: 23  PT Insurance Information: Medicare/Medical [place]  Total # of Visits Approved: 24 Per Physician Order  Total # of Visits to Date:   No Show: 0  Canceled Appointment: 0    23 Plan of Care/Recert Due    Pre-Treatment Pain:  2/10  Subjective: Pt feels much better today. States knee pain is minimal until stretched    Exercises:  Exercise 2: SciFIT bike: level 3 x 13 mintues , progressive move seat closer  Exercise 3: step stretch 4x30  Exercise 4: FSU 6 inch 10x  Exercise 7: TG squats lvl 5 ( for flexion) 10x  Exercise 13: sit to stand 3 x 10    Manual:  Joint Mobilization: Mobs for flexion and extension  Other: PROM of knee into flexion and extension--long static holds--prone and seated position       Assessment  Assessment: Pt flexion remains 82-84 deg passive. Extension -5 deg followinf session. Long static holds for stretching and mobs in supine position    Activity Tolerance  Activity Tolerance: Patient tolerated treatment well    Patient Education  Patient Education: HEP  Pt verbalized/demonstrated good understanding:     [x] Yes         [] No, pt required further clarification.        Post Treatment Pain:  2/10      Plan  Plan Frequency: 7  Plan weeks: 4       Goals  (Total # of Visits to Date: 34)      Short Term Goals  Time Frame for Short Term Goals: 2 weeks  Short Term Goal 1: Patient will be initiated with a HEP -MET  Short Term Goal 2: Patient will achieve 90* of knee flexion. -Progressing (2023 Right knee flexion = 81 degrees)  Short Term Goal

## 2023-09-15 ENCOUNTER — HOSPITAL ENCOUNTER (OUTPATIENT)
Dept: PHYSICAL THERAPY | Age: 73
Setting detail: THERAPIES SERIES
Discharge: HOME | End: 2023-09-15
Payer: MEDICARE

## 2023-09-15 PROCEDURE — 97110 THERAPEUTIC EXERCISES: CPT

## 2023-09-15 PROCEDURE — 97140 MANUAL THERAPY 1/> REGIONS: CPT

## 2023-09-18 ENCOUNTER — HOSPITAL ENCOUNTER (OUTPATIENT)
Dept: PHYSICAL THERAPY | Age: 73
Setting detail: THERAPIES SERIES
Discharge: HOME | End: 2023-09-18
Payer: MEDICARE

## 2023-09-18 PROCEDURE — 97110 THERAPEUTIC EXERCISES: CPT

## 2023-09-18 PROCEDURE — 97140 MANUAL THERAPY 1/> REGIONS: CPT

## 2023-09-19 NOTE — PROGRESS NOTES
Phone: [address]           Fax: [phone]                           Outpatient Physical Therapy                                                                            Daily Note    Patient: Iram Kennedy : 1950  CSN #: [id number]   Referring Physician: Kyra Medrano MD  Date: 2023    Diagnosis: R TKA  Treatment Diagnosis: s/p R TKA, R knee pain, generalized weakness    Onset Date: 23  PT Insurance Information: Medicare/Medical [place]  Total # of Visits Approved: 24 Per Physician Order  Total # of Visits to Date: 32  No Show: 0  Canceled Appointment: 0    23 Plan of Care/Recert Due    Pre-Treatment Pain:  0/10  Subjective: Pt reports he just stays very tight. Pt denies pain currently but just so tight. Exercises:  Exercise 2: SciFIT bike: level 3 x 13 mintues , progressive move seat closer  Exercise 3: step stretch 4x30  Exercise 4: FSU 8 inch 15x  Exercise 13: sit to stand 3 x 10    Manual:  Other: PROM of knee into flexion and extension--long static holds-- seated position       Assessment  Assessment: Pt feels sore around his whole knee. Progressed step ups to 8 inches. Activity Tolerance  Activity Tolerance: Patient tolerated treatment well    Patient Education  Patient Education: HEP  Pt verbalized/demonstrated good understanding:     [x] Yes         [] No, pt required further clarification. Post Treatment Pain:  0/10      Plan  Plan Frequency: 7  Plan weeks: 4       Goals  (Total # of Visits to Date: 32)      Short Term Goals  Time Frame for Short Term Goals: 2 weeks  Short Term Goal 2: Patient will achieve 90* of knee flexion. -Progressing (2023 Right knee flexion = 81 degrees)  Short Term Goal 3: Patient will tolerate manual interventions and modalities to improve ROM.  -MET    Long Term Goals  Time Frame for Long Term Goals : 4 weeks  Long Term Goal 1: Patient will be independent and compliant with a HEP  Long Term

## 2023-09-20 ENCOUNTER — HOSPITAL ENCOUNTER (OUTPATIENT)
Dept: PHYSICAL THERAPY | Age: 73
Setting detail: THERAPIES SERIES
Discharge: HOME | End: 2023-09-20
Payer: MEDICARE

## 2023-09-20 PROCEDURE — 97140 MANUAL THERAPY 1/> REGIONS: CPT

## 2023-09-20 PROCEDURE — 97110 THERAPEUTIC EXERCISES: CPT

## 2023-09-20 NOTE — PROGRESS NOTES
1-2/10      Plan  Plan Frequency: 7  Plan weeks: 4       Goals  (Total # of Visits to Date: 28)      Short Term Goals  Time Frame for Short Term Goals: 2 weeks  Short Term Goal 1: Patient will be initiated with a HEP -MET  Short Term Goal 2: Patient will achieve 90* of knee flexion. -Progressing (9/9/2023 Right knee flexion = 81 degrees)  Short Term Goal 3: Patient will tolerate manual interventions and modalities to improve ROM. -MET    Long Term Goals  Time Frame for Long Term Goals : 4 weeks  Long Term Goal 1: Patient will be independent and compliant with a HEP  Long Term Goal 2: Patient will improve R knee ROM to 0-110* for ADLs. Long Term Goal 3: Patient will improve R LE strength go >/= 4/5 in all major joints and planes-progressing  Long Term Goal 4: Patient will be able to ambulate community distances without an AD with minimal gait deviations-progressing  Long Term Goal 5: Patient will report 70% improvement in overall symptoms and function.     Minutes Tracking:  Time In: 8873  Time Out: 1500 [facility]  Minutes: [address] [place], [place]     Date: 9/20/2023

## 2023-09-22 ENCOUNTER — HOSPITAL ENCOUNTER (OUTPATIENT)
Dept: PHYSICAL THERAPY | Age: 73
Setting detail: THERAPIES SERIES
Discharge: HOME | End: 2023-09-22
Payer: MEDICARE

## 2023-09-22 PROCEDURE — 97110 THERAPEUTIC EXERCISES: CPT

## 2023-09-22 PROCEDURE — 97140 MANUAL THERAPY 1/> REGIONS: CPT

## 2023-09-22 NOTE — PROGRESS NOTES
well    Patient Education  Patient Education: IASTM  Pt verbalized/demonstrated good understanding:     [x] Yes         [] No, pt required further clarification. Post Treatment Pain:  1-2/10      Plan  Plan Frequency: 7  Plan weeks: 4       Goals  (Total # of Visits to Date: 35)      Short Term Goals  Time Frame for Short Term Goals: 2 weeks  Short Term Goal 1: Patient will be initiated with a HEP -MET  Short Term Goal 2: Patient will achieve 90* of knee flexion. -Progressing (9/22/2023 Right knee flexion = 85 degrees)  Short Term Goal 3: Patient will tolerate manual interventions and modalities to improve ROM. -MET    Long Term Goals  Time Frame for Long Term Goals : 4 weeks  Long Term Goal 1: Patient will be independent and compliant with a HEP-progressing  Long Term Goal 2: Patient will improve R knee ROM to 0-110* for ADLs. -progressing  Long Term Goal 3: Patient will improve R LE strength go >/= 4/5 in all major joints and planes-progressing  Long Term Goal 4: Patient will be able to ambulate community distances without an AD with minimal gait deviations-progressing  Long Term Goal 5: Patient will report 70% improvement in overall symptoms and function.     Minutes Tracking:  Time In: 1419  Time Out: 900 [facility]  Minutes: [place], [place]     Date: 9/22/2023

## 2023-09-25 ENCOUNTER — HOSPITAL ENCOUNTER (OUTPATIENT)
Dept: PHYSICAL THERAPY | Age: 73
Setting detail: THERAPIES SERIES
Discharge: HOME | End: 2023-09-25
Payer: MEDICARE

## 2023-09-25 PROCEDURE — 97110 THERAPEUTIC EXERCISES: CPT

## 2023-09-25 PROCEDURE — 97140 MANUAL THERAPY 1/> REGIONS: CPT

## 2023-09-25 NOTE — PROGRESS NOTES
Phone: [address] [facility]           Fax: [phone]                           Outpatient Physical Therapy                                                                            Daily Note    Patient: Becca Brandt : 1950  CSN #: [id number]   Referring Physician: Rebekah Evangelista MD  Date: 2023    Diagnosis: R TKA  Treatment Diagnosis: s/p R TKA, R knee pain, generalized weakness    Onset Date: 23  PT Insurance Information: Medicare/Medical [place]  Total # of Visits Approved: 24 Per Physician Order  Total # of Visits to Date:   No Show: 0  Canceled Appointment: 0    23 Plan of Care/Recert Due    Pre-Treatment Pain:  1-2/10  Subjective: Pt states his R knee was more sore saturday. Pt reports tightness today. Exercises:  Exercise 1: HEP: knee prop, seated knee flexion stretch or step stretch or heel slides 4x/day  Exercise 2: SciFIT bike: level 3 x 10 mintues , progressive move seat closer  Exercise 3: step stretch 4x30  Exercise 5: seated knee stretch 10 x 10 second holds overpressure  Exercise 7: TG squats lvl 5 ( for flexion) 10x  Exercise 15: HS curls on wheeled chair  Exercise 16: prone HS curls x10    Manual:  Soft Tissue Mobilizaton: Distal quads/scar mobilization; scrapping to quad/IT band to reduce tissue adhesions, swelling, and pain cupping today with 1 cup/ sliding. - no cupping today  Other: PROM of knee into flexion and extension--long static holds-- seated position      Assessment  Assessment: Pt with increased tightness in posterior R knee this visit. STM and scraping with IASTM to R hamstring and calf, good tolerance. Pt able to acheive 80* with chair walk, 85* with overpressure. Continue to progress as pt tolerates.     Activity Tolerance  Activity Tolerance: Patient tolerated treatment well    Patient Education  Patient Education: HEP  Pt verbalized/demonstrated good understanding:     [x] Yes         [] No, pt required further

## 2023-09-26 ENCOUNTER — HOSPITAL ENCOUNTER (OUTPATIENT)
Age: 73
Discharge: HOME | End: 2023-09-26
Payer: MEDICARE

## 2023-09-26 DIAGNOSIS — Z13.220 LIPID SCREENING: ICD-10-CM

## 2023-09-26 DIAGNOSIS — Z12.5 SCREENING PSA (PROSTATE SPECIFIC ANTIGEN): ICD-10-CM

## 2023-09-26 LAB
CHOLEST SERPL-MCNC: 157 MG/DL
CHOLESTEROL/HDL RATIO: 3
HDLC SERPL-MCNC: 53 MG/DL
LDLC SERPL CALC-MCNC: 92 MG/DL (ref 0–130)
PSA SERPL-MCNC: 1.93 NG/ML
TRIGL SERPL-MCNC: 62 MG/DL

## 2023-09-26 PROCEDURE — 36415 COLL VENOUS BLD VENIPUNCTURE: CPT

## 2023-09-26 PROCEDURE — 80061 LIPID PANEL: CPT

## 2023-09-26 PROCEDURE — G0103 PSA SCREENING: HCPCS

## 2023-09-27 ENCOUNTER — HOSPITAL ENCOUNTER (OUTPATIENT)
Dept: PHYSICAL THERAPY | Age: 73
Setting detail: THERAPIES SERIES
Discharge: HOME | End: 2023-09-27
Payer: MEDICARE

## 2023-09-27 ENCOUNTER — TELEPHONE (OUTPATIENT)
Dept: PRIMARY CARE CLINIC | Age: 73
End: 2023-09-27

## 2023-09-27 ENCOUNTER — HOSPITAL ENCOUNTER (OUTPATIENT)
Age: 73
Setting detail: SPECIMEN
Discharge: HOME | End: 2023-09-27
Payer: MEDICARE

## 2023-09-27 ENCOUNTER — TELEPHONE (OUTPATIENT)
Dept: UROLOGY | Age: 73
End: 2023-09-27

## 2023-09-27 DIAGNOSIS — Z12.5 SCREENING PSA (PROSTATE SPECIFIC ANTIGEN): Primary | ICD-10-CM

## 2023-09-27 DIAGNOSIS — E11.9 TYPE 2 DIABETES MELLITUS WITHOUT COMPLICATION, WITHOUT LONG-TERM CURRENT USE OF INSULIN (HCC): ICD-10-CM

## 2023-09-27 PROCEDURE — 97110 THERAPEUTIC EXERCISES: CPT

## 2023-09-27 PROCEDURE — 82570 ASSAY OF URINE CREATININE: CPT

## 2023-09-27 PROCEDURE — 97140 MANUAL THERAPY 1/> REGIONS: CPT

## 2023-09-27 PROCEDURE — 82043 UR ALBUMIN QUANTITATIVE: CPT

## 2023-09-27 NOTE — PROGRESS NOTES
Education: HEP  Pt verbalized/demonstrated good understanding:     [x] Yes         [] No, pt required further clarification. Post Treatment Pain:  3/10      Plan  Plan Frequency: 7  Plan weeks: 4       Goals  (Total # of Visits to Date: 28)      Short Term Goals  Time Frame for Short Term Goals: 2 weeks  Short Term Goal 1: Patient will be initiated with a HEP -MET  Short Term Goal 2: Patient will achieve 90* of knee flexion. -Progressing (9/27/2023 Right knee flexion = 87 degrees)  Short Term Goal 3: Patient will tolerate manual interventions and modalities to improve ROM. -MET    Long Term Goals  Time Frame for Long Term Goals : 4 weeks  Long Term Goal 1: Patient will be independent and compliant with a HEP-progressing  Long Term Goal 2: Patient will improve R knee ROM to 0-110* for ADLs. -progressing  Long Term Goal 3: Patient will improve R LE strength go >/= 4/5 in all major joints and planes-progressing  Long Term Goal 4: Patient will be able to ambulate community distances without an AD with minimal gait deviations-progressing  Long Term Goal 5: Patient will report 70% improvement in overall symptoms and function.     Minutes Tracking:  Time In: 9779  Time Out: 1235 [facility]  Minutes: 40  Timed Code Treatment Minutes: [address] [place], [place]     Date: 9/27/2023

## 2023-09-27 NOTE — TELEPHONE ENCOUNTER
----- Message from Romana Gayer, APRN - CNP sent at 9/27/2023  2:22 PM EDT -----  Please notify patient of normal lab results.   Thanks Candelario

## 2023-09-27 NOTE — TELEPHONE ENCOUNTER
Patient needs to schedule a psa follow up in one year, aprox 09/27/2023. He has a follow up scheduled in August next year for a stone follow up on August 6th with a KUB prior. He is questioning if everything can be done in one appointment, in September next year.

## 2023-09-27 NOTE — RESULT ENCOUNTER NOTE
See telephone encounter [facility]ato Pregnancy And Lactation Text: This medication is Pregnancy Category C. It is unknown if this medication is excreted in breast milk.

## 2023-09-27 NOTE — TELEPHONE ENCOUNTER
Patient was advised of response. He then requested seeing us in October due to being [place] of [place] in [place]. He is scheduled but will need new orders when pre-visit planning is done next year.

## 2023-09-27 NOTE — TELEPHONE ENCOUNTER
Please let him know that his PSA was 1.93    This is low and stable for his age.   We will recheck this again in 1 year

## 2023-09-28 ENCOUNTER — TELEPHONE (OUTPATIENT)
Dept: PRIMARY CARE CLINIC | Age: 73
End: 2023-09-28

## 2023-09-28 LAB
CREAT UR-MCNC: 143.6 MG/DL (ref 39–259)
MICROALBUMIN UR-MCNC: <12 MG/L
MICROALBUMIN/CREAT UR-RTO: NORMAL MCG/MG CREAT

## 2023-09-28 NOTE — TELEPHONE ENCOUNTER
----- Message from SHEYLA Juarez CNP sent at 9/28/2023  1:08 PM EDT -----  Please notify patient of normal lab results.   Thanks Missy's

## 2023-09-29 ENCOUNTER — HOSPITAL ENCOUNTER (OUTPATIENT)
Dept: PHYSICAL THERAPY | Age: 73
Setting detail: THERAPIES SERIES
Discharge: HOME | End: 2023-09-29
Payer: MEDICARE

## 2023-09-29 PROCEDURE — 97140 MANUAL THERAPY 1/> REGIONS: CPT

## 2023-09-29 NOTE — PROGRESS NOTES
lataeral antebrachial cutaneous at homeostatic neuro- trigger points to. ..   ___No manipulation/static  ___Basic Manipulation  ___Pistoning Manipulation  ___Needle Rotation  ___Tenting           Assessment  Assessment: pt requested to discharge today stating no new improvements have been made. Pt displays AROM 82* flexion this date. Pt states he is going to go back to the doctor and talk about further options to improve mobility and function. continued with manual therapy with fair tolerance. Discussed return policy and wellness program with patient. Activity Tolerance  Activity Tolerance: Patient tolerated treatment well    Patient Education  Patient Education: HEP  Pt verbalized/demonstrated good understanding:     [x] Yes         [] No, pt required further clarification. Post Treatment Pain:  2/10      Plan  Plan Frequency: 7  Plan weeks: 4       Goals  (Total # of Visits to Date: 39)      Short Term Goals  Time Frame for Short Term Goals: 2 weeks  Short Term Goal 1: Patient will be initiated with a HEP -MET  Short Term Goal 2: Patient will achieve 90* of knee flexion. -Progressing (9/27/2023 Right knee flexion = 87 degrees)  Short Term Goal 3: Patient will tolerate manual interventions and modalities to improve ROM. -MET    Long Term Goals  Time Frame for Long Term Goals : 4 weeks  Long Term Goal 1: Patient will be independent and compliant with a HEP-progressing  Long Term Goal 2: Patient will improve R knee ROM to 0-110* for ADLs. -progressing  Long Term Goal 3: Patient will improve R LE strength go >/= 4/5 in all major joints and planes-progressing  Long Term Goal 4: Patient will be able to ambulate community distances without an AD with minimal gait deviations-progressing  Long Term Goal 5: Patient will report 70% improvement in overall symptoms and function.     Minutes Tracking:  Time In: 1315  Time Out: 1400  Minutes: 45  Timed Code Treatment Minutes: Betty Arvizu

## 2023-10-16 ENCOUNTER — OFFICE VISIT (OUTPATIENT)
Dept: PRIMARY CARE CLINIC | Age: 73
End: 2023-10-16
Payer: MEDICARE

## 2023-10-16 VITALS
OXYGEN SATURATION: 96 % | RESPIRATION RATE: 16 BRPM | DIASTOLIC BLOOD PRESSURE: 80 MMHG | HEIGHT: 68 IN | SYSTOLIC BLOOD PRESSURE: 130 MMHG | HEART RATE: 84 BPM | TEMPERATURE: 97.9 F | BODY MASS INDEX: 31.83 KG/M2 | WEIGHT: 210 LBS

## 2023-10-16 DIAGNOSIS — Z00.00 INITIAL MEDICARE ANNUAL WELLNESS VISIT: Primary | ICD-10-CM

## 2023-10-16 DIAGNOSIS — M79.10 MUSCLE TENSION PAIN: ICD-10-CM

## 2023-10-16 DIAGNOSIS — E11.9 TYPE 2 DIABETES MELLITUS WITHOUT COMPLICATION, WITHOUT LONG-TERM CURRENT USE OF INSULIN (HCC): ICD-10-CM

## 2023-10-16 LAB — HBA1C MFR BLD: 6 %

## 2023-10-16 PROCEDURE — G8484 FLU IMMUNIZE NO ADMIN: HCPCS | Performed by: NURSE PRACTITIONER

## 2023-10-16 PROCEDURE — G8417 CALC BMI ABV UP PARAM F/U: HCPCS | Performed by: NURSE PRACTITIONER

## 2023-10-16 PROCEDURE — 99214 OFFICE O/P EST MOD 30 MIN: CPT | Performed by: NURSE PRACTITIONER

## 2023-10-16 PROCEDURE — 3044F HG A1C LEVEL LT 7.0%: CPT | Performed by: NURSE PRACTITIONER

## 2023-10-16 PROCEDURE — 1123F ACP DISCUSS/DSCN MKR DOCD: CPT | Performed by: NURSE PRACTITIONER

## 2023-10-16 PROCEDURE — 3079F DIAST BP 80-89 MM HG: CPT | Performed by: NURSE PRACTITIONER

## 2023-10-16 PROCEDURE — 1036F TOBACCO NON-USER: CPT | Performed by: NURSE PRACTITIONER

## 2023-10-16 PROCEDURE — G0438 PPPS, INITIAL VISIT: HCPCS | Performed by: NURSE PRACTITIONER

## 2023-10-16 PROCEDURE — 3075F SYST BP GE 130 - 139MM HG: CPT | Performed by: NURSE PRACTITIONER

## 2023-10-16 PROCEDURE — G8427 DOCREV CUR MEDS BY ELIG CLIN: HCPCS | Performed by: NURSE PRACTITIONER

## 2023-10-16 PROCEDURE — 83036 HEMOGLOBIN GLYCOSYLATED A1C: CPT | Performed by: NURSE PRACTITIONER

## 2023-10-16 PROCEDURE — 2022F DILAT RTA XM EVC RTNOPTHY: CPT | Performed by: NURSE PRACTITIONER

## 2023-10-16 PROCEDURE — 3017F COLORECTAL CA SCREEN DOC REV: CPT | Performed by: NURSE PRACTITIONER

## 2023-10-16 RX ORDER — BACLOFEN 5 MG/1
10 TABLET ORAL 3 TIMES DAILY PRN
Qty: 120 TABLET | Refills: 0 | Status: SHIPPED | OUTPATIENT
Start: 2023-10-16 | End: 2023-10-17 | Stop reason: ALTCHOICE

## 2023-10-16 ASSESSMENT — PATIENT HEALTH QUESTIONNAIRE - PHQ9
2. FEELING DOWN, DEPRESSED OR HOPELESS: 0
SUM OF ALL RESPONSES TO PHQ QUESTIONS 1-9: 0
1. LITTLE INTEREST OR PLEASURE IN DOING THINGS: 0
SUM OF ALL RESPONSES TO PHQ9 QUESTIONS 1 & 2: 0
SUM OF ALL RESPONSES TO PHQ QUESTIONS 1-9: 0

## 2023-10-16 ASSESSMENT — LIFESTYLE VARIABLES
HOW MANY STANDARD DRINKS CONTAINING ALCOHOL DO YOU HAVE ON A TYPICAL DAY: PATIENT DOES NOT DRINK
HOW OFTEN DO YOU HAVE A DRINK CONTAINING ALCOHOL: NEVER

## 2023-10-16 NOTE — PROGRESS NOTES
rash or erythema  Head: normocephalic and atraumatic  Eyes: pupils equal, round, and reactive to light, extraocular eye movements intact, conjunctivae normal  ENT: tympanic membrane, external ear and ear canal normal bilaterally, nose without deformity, nasal mucosa and turbinates normal without polyps  Neck: supple and non-tender without mass, no thyromegaly or thyroid nodules, no cervical lymphadenopathy  Pulmonary/Chest: clear to auscultation bilaterally- no wheezes, rales or rhonchi, normal air movement, no respiratory distress  Cardiovascular: normal rate, regular rhythm, normal S1 and S2, no murmurs, rubs, clicks, or gallops, distal pulses intact, no carotid bruits  Abdomen: soft, non-tender, non-distended, normal bowel sounds, no masses or organomegaly  Extremities: no cyanosis, clubbing or edema  Musculoskeletal: Right knee with limited ROM and tenderness. Neurologic: reflexes normal and symmetric, no cranial nerve deficit, gait, coordination and speech normal       No Known Allergies  Prior to Visit Medications    Medication Sig Taking? Authorizing Provider   Baclofen (LIORESAL) 5 MG tablet Take 2 tablets by mouth 3 times daily as needed (right leg muscle spasms and pain) Yes SHEYLA Harper CNP   acetaminophen (TYLENOL) 500 MG tablet Take 1 tablet by mouth every 6 hours as needed for Pain Yes ProviderVirginie MD   oxyCODONE (ROXICODONE) 5 MG immediate release tablet as needed.  Yes Virginie Carrillo MD   metFORMIN (GLUCOPHAGE-XR) 500 MG extended release tablet Take 1 tablet by mouth daily (with breakfast) Yes SHEYLA Harper CNP       CareTeam (Including outside providers/suppliers regularly involved in providing care):   Patient Care Team:  SHEYLA Harper CNP as PCP - General (Certified Nurse Practitioner)  SHEYLA Harper CNP as PCP - Empaneled Provider     Reviewed and updated this visit:  Tobacco  Allergies  Meds  Problems  Med Hx  Surg Hx  Soc Hx

## 2023-10-17 ENCOUNTER — TELEPHONE (OUTPATIENT)
Dept: PRIMARY CARE CLINIC | Age: 73
End: 2023-10-17

## 2023-10-17 NOTE — TELEPHONE ENCOUNTER
Patient called and said baclofen is to expensive and he would like something else called in if possible.   Please advise thank you

## 2024-04-15 ASSESSMENT — PATIENT HEALTH QUESTIONNAIRE - PHQ9
SUM OF ALL RESPONSES TO PHQ QUESTIONS 1-9: 0
2. FEELING DOWN, DEPRESSED OR HOPELESS: NOT AT ALL
1. LITTLE INTEREST OR PLEASURE IN DOING THINGS: NOT AT ALL
SUM OF ALL RESPONSES TO PHQ QUESTIONS 1-9: 0
SUM OF ALL RESPONSES TO PHQ QUESTIONS 1-9: 0
2. FEELING DOWN, DEPRESSED OR HOPELESS: NOT AT ALL
SUM OF ALL RESPONSES TO PHQ9 QUESTIONS 1 & 2: 0
SUM OF ALL RESPONSES TO PHQ9 QUESTIONS 1 & 2: 0
1. LITTLE INTEREST OR PLEASURE IN DOING THINGS: NOT AT ALL
SUM OF ALL RESPONSES TO PHQ QUESTIONS 1-9: 0

## 2024-04-16 ENCOUNTER — OFFICE VISIT (OUTPATIENT)
Dept: PRIMARY CARE CLINIC | Age: 74
End: 2024-04-16
Payer: MEDICARE

## 2024-04-16 VITALS
RESPIRATION RATE: 16 BRPM | TEMPERATURE: 98.7 F | DIASTOLIC BLOOD PRESSURE: 76 MMHG | HEART RATE: 73 BPM | OXYGEN SATURATION: 97 % | BODY MASS INDEX: 32.26 KG/M2 | WEIGHT: 212.2 LBS | SYSTOLIC BLOOD PRESSURE: 122 MMHG

## 2024-04-16 DIAGNOSIS — R41.3 MEMORY LOSS: ICD-10-CM

## 2024-04-16 DIAGNOSIS — E11.9 TYPE 2 DIABETES MELLITUS WITHOUT COMPLICATION, WITHOUT LONG-TERM CURRENT USE OF INSULIN (HCC): Primary | ICD-10-CM

## 2024-04-16 DIAGNOSIS — Z81.8 FAMILY HISTORY OF DEMENTIA: ICD-10-CM

## 2024-04-16 LAB — HBA1C MFR BLD: 6.4 %

## 2024-04-16 PROCEDURE — 99214 OFFICE O/P EST MOD 30 MIN: CPT | Performed by: NURSE PRACTITIONER

## 2024-04-16 PROCEDURE — G8417 CALC BMI ABV UP PARAM F/U: HCPCS | Performed by: NURSE PRACTITIONER

## 2024-04-16 PROCEDURE — 3078F DIAST BP <80 MM HG: CPT | Performed by: NURSE PRACTITIONER

## 2024-04-16 PROCEDURE — 1123F ACP DISCUSS/DSCN MKR DOCD: CPT | Performed by: NURSE PRACTITIONER

## 2024-04-16 PROCEDURE — 2022F DILAT RTA XM EVC RTNOPTHY: CPT | Performed by: NURSE PRACTITIONER

## 2024-04-16 PROCEDURE — 83036 HEMOGLOBIN GLYCOSYLATED A1C: CPT | Performed by: NURSE PRACTITIONER

## 2024-04-16 PROCEDURE — G8427 DOCREV CUR MEDS BY ELIG CLIN: HCPCS | Performed by: NURSE PRACTITIONER

## 2024-04-16 PROCEDURE — 1036F TOBACCO NON-USER: CPT | Performed by: NURSE PRACTITIONER

## 2024-04-16 PROCEDURE — 3044F HG A1C LEVEL LT 7.0%: CPT | Performed by: NURSE PRACTITIONER

## 2024-04-16 PROCEDURE — 3074F SYST BP LT 130 MM HG: CPT | Performed by: NURSE PRACTITIONER

## 2024-04-16 PROCEDURE — 3017F COLORECTAL CA SCREEN DOC REV: CPT | Performed by: NURSE PRACTITIONER

## 2024-04-16 NOTE — PROGRESS NOTES
[facility]YS[facility]  SNEHAL CAMERON CNP  [facility] PRIMARY CARE  [address] [place] [id number]  Dept: [phone]  Dept Fax: [phone]      Name: Nilay Senior  : 1950         Chief Complaint:     Chief Complaint   Patient presents with    Diabetes     6 month check.     Memory Loss     Patient concerned of increased memory loss.        History of Present Illness:      Nilay Senior is a 74 y.o.  male who presents with Diabetes (6 month check. ) and Memory Loss (Patient concerned of increased memory loss. )      NAN Alonzo is here today for a routine office visit.  He is still having bilateral knee pain that has not improved since his knee surgery.  He does follow up with Orthopedics.      He states that he has been having worsening memory loss.  He states that he continues to lose his glasses and other things that he should not lose.  He will walk to the barn and not remember why he went out there.  He states it has been becoming worse.  He has started using a roopa pack to keep his things together that his wife got him and that has helped some.  He states that he does have a family history of dementia.  He states simple things like his grand daughters name he forgot one day.  He does not get lost while driving but will forget what he went to town for.  He states within minutes he forgets things.    He states he does not take the Metformin.  He wants to continue with diet and exercise.    A1C 6.4 today    Past Medical History:     Past Medical History:   Diagnosis Date    Arthritis     Hyperlipidemia     Kidney stone       Reviewed all health maintenance requirements and ordered appropriate tests  Health Maintenance Due   Topic Date Due    Diabetic retinal exam  Never done    Respiratory Syncytial Virus (RSV) Pregnant or age 60 yrs+ (1 - 1-dose 60+ series) Never done    GFR test (Diabetes, CKD 3-4, OR last GFR 15-59)  2022       Past Surgical History:     Past

## 2024-04-16 NOTE — PATIENT INSTRUCTIONS
SURVEY:     You may be receiving a survey from [facility] [employer] regarding your visit today.     Please complete the survey to enable us to provide the highest quality of care to you and your family.     If you cannot score us a very good on any question, please call the office to discuss how we could have made your experience a very good one.     Thank you,    Miquel Mart, APRN-CNP  Gabrielle Dang, APRN-CNP  Raysa, LPN  Janel, CMA  Gaudencio, CMA  Larissa, CMA  Vickie, PCA  Kika, CMA  Elena, PM

## 2024-08-06 ENCOUNTER — TELEPHONE (OUTPATIENT)
Dept: UROLOGY | Age: 74
End: 2024-08-06

## 2024-08-06 DIAGNOSIS — N20.0 KIDNEY STONES: Primary | ICD-10-CM

## 2024-08-06 NOTE — TELEPHONE ENCOUNTER
Writer attempted to contact patient to advise he is able to get KUB and PSA done 9/27/24. Left voicemail for patient.

## 2024-08-06 NOTE — TELEPHONE ENCOUNTER
Patient called office. He went to registration to have prior testing done. Patient was told to wait until closer to appointment time to complete PSA and KUB. Patient scheduled 10/8/24. KUB will  24. Can you put in new order for KUB expected 24 so patient can do PSA and KUB at the same time.

## 2024-09-11 ENCOUNTER — HOSPITAL ENCOUNTER (OUTPATIENT)
Age: 74
Discharge: HOME | End: 2024-09-13
Payer: MEDICARE

## 2024-09-11 ENCOUNTER — HOSPITAL ENCOUNTER (OUTPATIENT)
Dept: GENERAL RADIOLOGY | Age: 74
Discharge: HOME | End: 2024-09-13
Payer: MEDICARE

## 2024-09-11 ENCOUNTER — HOSPITAL ENCOUNTER (OUTPATIENT)
Age: 74
Discharge: HOME | End: 2024-09-11
Payer: MEDICARE

## 2024-09-11 DIAGNOSIS — Z12.5 SCREENING PSA (PROSTATE SPECIFIC ANTIGEN): ICD-10-CM

## 2024-09-11 DIAGNOSIS — N20.0 KIDNEY STONES: ICD-10-CM

## 2024-09-11 LAB — PSA SERPL-MCNC: 2 NG/ML (ref 0–4)

## 2024-09-11 PROCEDURE — 36415 COLL VENOUS BLD VENIPUNCTURE: CPT

## 2024-09-11 PROCEDURE — G0103 PSA SCREENING: HCPCS

## 2024-09-11 PROCEDURE — 74018 RADEX ABDOMEN 1 VIEW: CPT

## 2024-10-08 ENCOUNTER — OFFICE VISIT (OUTPATIENT)
Dept: UROLOGY | Age: 74
End: 2024-10-08

## 2024-10-08 VITALS
BODY MASS INDEX: 31.83 KG/M2 | DIASTOLIC BLOOD PRESSURE: 79 MMHG | WEIGHT: 210 LBS | HEART RATE: 71 BPM | SYSTOLIC BLOOD PRESSURE: 166 MMHG | HEIGHT: 68 IN

## 2024-10-08 DIAGNOSIS — Z12.5 SCREENING PSA (PROSTATE SPECIFIC ANTIGEN): ICD-10-CM

## 2024-10-08 DIAGNOSIS — N20.0 KIDNEY STONES: Primary | ICD-10-CM

## 2024-10-08 NOTE — PROGRESS NOTES
History  5/2021 Left ureteral stent placement    6/2021 Left HLL    PSA  9/2024 - 2.00  9/2023 - 1.93    Today  Here today for annual follow-up due to history of stones.  He denies any episodes of spontaneous stone passage.  He denies any dysuria or gross hematuria.  He did have a KUB completed prior to today's visit.  This film was independently reviewed and shows no  calcifications.  He denies frequency, urgency, nocturia or incontinence.    Plan  Follow-up in 1 year with a KUB and PSA prior

## 2024-10-14 ENCOUNTER — TELEPHONE (OUTPATIENT)
Dept: PRIMARY CARE CLINIC | Age: 74
End: 2024-10-14

## 2024-10-14 DIAGNOSIS — E11.9 TYPE 2 DIABETES MELLITUS WITHOUT COMPLICATION, WITHOUT LONG-TERM CURRENT USE OF INSULIN (HCC): Primary | ICD-10-CM

## 2024-10-14 DIAGNOSIS — Z13.220 LIPID SCREENING: ICD-10-CM

## 2024-10-14 SDOH — ECONOMIC STABILITY: FOOD INSECURITY: WITHIN THE PAST 12 MONTHS, THE FOOD YOU BOUGHT JUST DIDN'T LAST AND YOU DIDN'T HAVE MONEY TO GET MORE.: NEVER TRUE

## 2024-10-14 SDOH — ECONOMIC STABILITY: FOOD INSECURITY: WITHIN THE PAST 12 MONTHS, YOU WORRIED THAT YOUR FOOD WOULD RUN OUT BEFORE YOU GOT MONEY TO BUY MORE.: NEVER TRUE

## 2024-10-14 SDOH — ECONOMIC STABILITY: INCOME INSECURITY: HOW HARD IS IT FOR YOU TO PAY FOR THE VERY BASICS LIKE FOOD, HOUSING, MEDICAL CARE, AND HEATING?: NOT HARD AT ALL

## 2024-10-14 SDOH — HEALTH STABILITY: PHYSICAL HEALTH
ON AVERAGE, HOW MANY DAYS PER WEEK DO YOU ENGAGE IN MODERATE TO STRENUOUS EXERCISE (LIKE A BRISK WALK)?: PATIENT DECLINED

## 2024-10-14 SDOH — ECONOMIC STABILITY: TRANSPORTATION INSECURITY
IN THE PAST 12 MONTHS, HAS LACK OF TRANSPORTATION KEPT YOU FROM MEETINGS, WORK, OR FROM GETTING THINGS NEEDED FOR DAILY LIVING?: NO

## 2024-10-14 ASSESSMENT — LIFESTYLE VARIABLES
HOW MANY STANDARD DRINKS CONTAINING ALCOHOL DO YOU HAVE ON A TYPICAL DAY: PATIENT DOES NOT DRINK
HOW OFTEN DO YOU HAVE A DRINK CONTAINING ALCOHOL: NEVER
HOW OFTEN DO YOU HAVE A DRINK CONTAINING ALCOHOL: 1
HOW OFTEN DO YOU HAVE SIX OR MORE DRINKS ON ONE OCCASION: 1

## 2024-10-14 ASSESSMENT — PATIENT HEALTH QUESTIONNAIRE - PHQ9
SUM OF ALL RESPONSES TO PHQ QUESTIONS 1-9: 0
SUM OF ALL RESPONSES TO PHQ QUESTIONS 1-9: 0
SUM OF ALL RESPONSES TO PHQ9 QUESTIONS 1 & 2: 0
1. LITTLE INTEREST OR PLEASURE IN DOING THINGS: NOT AT ALL
SUM OF ALL RESPONSES TO PHQ QUESTIONS 1-9: 0
SUM OF ALL RESPONSES TO PHQ QUESTIONS 1-9: 0
2. FEELING DOWN, DEPRESSED OR HOPELESS: NOT AT ALL

## 2024-10-14 NOTE — TELEPHONE ENCOUNTER
Patient contacted the office and stated he wanted to know if he should have lab work completed before his appt on 10/17. There are no active labs did you want to have patient do labs?  Please advise thank you

## 2024-10-15 ENCOUNTER — HOSPITAL ENCOUNTER (OUTPATIENT)
Age: 74
Discharge: HOME | End: 2024-10-15
Payer: MEDICARE

## 2024-10-15 DIAGNOSIS — Z13.220 LIPID SCREENING: ICD-10-CM

## 2024-10-15 DIAGNOSIS — E11.9 TYPE 2 DIABETES MELLITUS WITHOUT COMPLICATION, WITHOUT LONG-TERM CURRENT USE OF INSULIN (HCC): ICD-10-CM

## 2024-10-15 LAB
ALBUMIN SERPL-MCNC: 4.5 G/DL (ref 3.5–5.2)
ALBUMIN/GLOB SERPL: 2.3 {RATIO} (ref 1–2.5)
ALP SERPL-CCNC: 84 U/L (ref 40–129)
ALT SERPL-CCNC: 47 U/L (ref 10–50)
ANION GAP SERPL CALCULATED.3IONS-SCNC: 10 MMOL/L (ref 9–16)
AST SERPL-CCNC: 27 U/L (ref 10–50)
BASOPHILS # BLD: 0.12 K/UL (ref 0–0.2)
BASOPHILS NFR BLD: 1 % (ref 0–2)
BILIRUB SERPL-MCNC: 0.7 MG/DL (ref 0–1.2)
BUN SERPL-MCNC: 13 MG/DL (ref 8–23)
BUN/CREAT SERPL: 14 (ref 9–20)
CALCIUM SERPL-MCNC: 9.4 MG/DL (ref 8.6–10.4)
CHLORIDE SERPL-SCNC: 104 MMOL/L (ref 98–107)
CHOLEST SERPL-MCNC: 162 MG/DL (ref 0–199)
CHOLESTEROL/HDL RATIO: 4
CO2 SERPL-SCNC: 27 MMOL/L (ref 20–31)
CREAT SERPL-MCNC: 0.9 MG/DL (ref 0.7–1.2)
CREAT UR-MCNC: 170 MG/DL (ref 39–259)
EOSINOPHIL # BLD: 0.32 K/UL (ref 0–0.44)
EOSINOPHILS RELATIVE PERCENT: 3 % (ref 1–4)
ERYTHROCYTE [DISTWIDTH] IN BLOOD BY AUTOMATED COUNT: 15.6 % (ref 11.8–14.4)
EST. AVERAGE GLUCOSE BLD GHB EST-MCNC: 134 MG/DL
GFR, ESTIMATED: 89 ML/MIN/1.73M2
GLUCOSE SERPL-MCNC: 156 MG/DL (ref 74–99)
HBA1C MFR BLD: 6.3 % (ref 4–6)
HCT VFR BLD AUTO: 40.7 % (ref 40.7–50.3)
HDLC SERPL-MCNC: 44 MG/DL
HGB BLD-MCNC: 13.6 G/DL (ref 13–17)
IMM GRANULOCYTES # BLD AUTO: 0.04 K/UL (ref 0–0.3)
IMM GRANULOCYTES NFR BLD: 0 %
LDLC SERPL CALC-MCNC: 104 MG/DL (ref 0–100)
LYMPHOCYTES NFR BLD: 2.93 K/UL (ref 1.1–3.7)
LYMPHOCYTES RELATIVE PERCENT: 24 % (ref 24–43)
MCH RBC QN AUTO: 32.4 PG (ref 25.2–33.5)
MCHC RBC AUTO-ENTMCNC: 33.4 G/DL (ref 28.4–34.8)
MCV RBC AUTO: 96.9 FL (ref 82.6–102.9)
MICROALBUMIN UR-MCNC: <12 MG/L (ref 0–20)
MICROALBUMIN/CREAT UR-RTO: NORMAL MCG/MG CREAT (ref 0–17)
MONOCYTES NFR BLD: 1.05 K/UL (ref 0.1–1.2)
MONOCYTES NFR BLD: 9 % (ref 3–12)
NEUTROPHILS NFR BLD: 63 % (ref 36–65)
NEUTS SEG NFR BLD: 7.72 K/UL (ref 1.5–8.1)
NRBC BLD-RTO: 0.4 PER 100 WBC
PLATELET # BLD AUTO: 294 K/UL (ref 138–453)
PMV BLD AUTO: 10.3 FL (ref 8.1–13.5)
POTASSIUM SERPL-SCNC: 4.9 MMOL/L (ref 3.7–5.3)
PROT SERPL-MCNC: 6.5 G/DL (ref 6.6–8.7)
RBC # BLD AUTO: 4.2 M/UL (ref 4.21–5.77)
SODIUM SERPL-SCNC: 141 MMOL/L (ref 136–145)
TRIGL SERPL-MCNC: 66 MG/DL
VLDLC SERPL CALC-MCNC: 13 MG/DL
WBC OTHER # BLD: 12.2 K/UL (ref 3.5–11.3)

## 2024-10-15 PROCEDURE — 80053 COMPREHEN METABOLIC PANEL: CPT

## 2024-10-15 PROCEDURE — 82570 ASSAY OF URINE CREATININE: CPT

## 2024-10-15 PROCEDURE — 85025 COMPLETE CBC W/AUTO DIFF WBC: CPT

## 2024-10-15 PROCEDURE — 83036 HEMOGLOBIN GLYCOSYLATED A1C: CPT

## 2024-10-15 PROCEDURE — 36415 COLL VENOUS BLD VENIPUNCTURE: CPT

## 2024-10-15 PROCEDURE — 80061 LIPID PANEL: CPT

## 2024-10-15 PROCEDURE — 82043 UR ALBUMIN QUANTITATIVE: CPT

## 2024-10-17 ENCOUNTER — OFFICE VISIT (OUTPATIENT)
Dept: PRIMARY CARE CLINIC | Age: 74
End: 2024-10-17
Payer: MEDICARE

## 2024-10-17 VITALS
SYSTOLIC BLOOD PRESSURE: 128 MMHG | TEMPERATURE: 98.1 F | RESPIRATION RATE: 16 BRPM | HEIGHT: 68 IN | DIASTOLIC BLOOD PRESSURE: 84 MMHG | OXYGEN SATURATION: 96 % | WEIGHT: 213.6 LBS | BODY MASS INDEX: 32.37 KG/M2 | HEART RATE: 77 BPM

## 2024-10-17 DIAGNOSIS — Z00.00 MEDICARE ANNUAL WELLNESS VISIT, SUBSEQUENT: Primary | ICD-10-CM

## 2024-10-17 DIAGNOSIS — E11.9 TYPE 2 DIABETES MELLITUS WITHOUT COMPLICATION, WITHOUT LONG-TERM CURRENT USE OF INSULIN (HCC): ICD-10-CM

## 2024-10-17 PROCEDURE — 3079F DIAST BP 80-89 MM HG: CPT | Performed by: NURSE PRACTITIONER

## 2024-10-17 PROCEDURE — 1123F ACP DISCUSS/DSCN MKR DOCD: CPT | Performed by: NURSE PRACTITIONER

## 2024-10-17 PROCEDURE — 3044F HG A1C LEVEL LT 7.0%: CPT | Performed by: NURSE PRACTITIONER

## 2024-10-17 PROCEDURE — G8484 FLU IMMUNIZE NO ADMIN: HCPCS | Performed by: NURSE PRACTITIONER

## 2024-10-17 PROCEDURE — G0439 PPPS, SUBSEQ VISIT: HCPCS | Performed by: NURSE PRACTITIONER

## 2024-10-17 PROCEDURE — 3074F SYST BP LT 130 MM HG: CPT | Performed by: NURSE PRACTITIONER

## 2024-10-17 PROCEDURE — 3017F COLORECTAL CA SCREEN DOC REV: CPT | Performed by: NURSE PRACTITIONER

## 2024-10-17 NOTE — PROGRESS NOTES
Medicare Annual Wellness Visit    Nilay Senior is here for Medicare AWV    Assessment & Plan   Medicare annual wellness visit, subsequent  Type 2 diabetes mellitus without complication, without long-term current use of insulin (HCC)    Recommendations for Preventive Services Due: see orders and patient instructions/AVS.  Recommended screening schedule for the next 5-10 years is provided to the patient in written form: see Patient Instructions/AVS.     Return in 1 year (on 10/17/2025) for Medicare AWV.     Subjective   The following acute and/or chronic problems were also addressed today:    Type 2 Diabetes and does not does take Metormin. He does try to manage his diet and be active.  He is leaving next month for [place].    A1C 6.3 on 10/15/24    Patient's complete Health Risk Assessment and screening values have been reviewed and are found in Flowsheets. The following problems were reviewed today and where indicated follow up appointments were made and/or referrals ordered.    Positive Risk Factor Screenings with Interventions:            Controlled Medication Review:    Today's Pain Level: No data recorded   Opioid Risk: (Low risk score <55) Opioid risk score: 3    Patient is low risk for opioid use disorder or overdose.    Last PDMP Philip as Reviewed:  Review User Review Instant Review Result   NISHASNEHAL 10/16/2023  2:44 PM     Reviewed PDMP [1]     Last Controlled Substance Monitoring Documentation      Flowsheet Row Office Visit from 10/17/2024 in [facility]   Periodic Controlled Substance Monitoring No signs of potential drug abuse or diversion identified. filed at 10/17/2024 7063               Abnormal BMI (obese):  Body mass index is 32.48 kg/m². (!) Abnormal  Interventions:  Patient declines any further evaluation or treatment  Going to [place] in November and planning on walking a mile a day.      Dentist Screen:  Have you seen the dentist within the past year?: (!)  no

## 2024-10-17 NOTE — PATIENT INSTRUCTIONS
SURVEY:     You may be receiving a survey from [facility] regarding your visit today.     Please complete the survey to enable us to provide the highest quality of care to you and your family.     If you cannot score us a very good on any question, please call the office to discuss how we could have made your experience a very good one.     Thank you,    Miquel Mart, APRN-CNP  Gabrielle Dang, APRN-CNP  Raysa, LPN  Janel, CMA  Gaudencio, CMA  Larissa, CMA  Vickie, PCA  Kika, CMA  Elena, PM         Learning About Dental Care for Older Adults  Dental care for older adults: Overview  Dental care for older people is much the same as for younger adults. But older adults do have concerns that younger adults do not. Older adults may have problems with gum disease and decay on the roots of their teeth. They may need missing teeth replaced or broken fillings fixed. Or they may have dentures that need to be cared for. Some older adults may have trouble holding a toothbrush.  You can help remind the person you are caring for to brush and floss their teeth or to clean their dentures. In some cases, you may need to do the brushing and other dental care tasks. People who have trouble using their hands or who have dementia may need this extra help.  How can you help with dental care?  Normal dental care  To keep the teeth and gums healthy:  Brush the teeth with fluoride toothpaste twice a day--in the morning and at night--and floss at least once a day. Plaque can quickly build up on the teeth of older adults.  Watch for the signs of gum disease. These signs include gums that bleed after brushing or after eating hard foods, such as apples.  See a dentist regularly. Many experts recommend checkups every 6 months.  Keep the dentist up to date on any new medications the person is taking.  Encourage a balanced diet that includes whole grains, vegetables, and fruits, and that is low in saturated fat and sodium.  Encourage the person

## 2025-06-09 ASSESSMENT — PATIENT HEALTH QUESTIONNAIRE - PHQ9
SUM OF ALL RESPONSES TO PHQ QUESTIONS 1-9: 0
2. FEELING DOWN, DEPRESSED OR HOPELESS: NOT AT ALL
SUM OF ALL RESPONSES TO PHQ QUESTIONS 1-9: 0
SUM OF ALL RESPONSES TO PHQ9 QUESTIONS 1 & 2: 0
1. LITTLE INTEREST OR PLEASURE IN DOING THINGS: NOT AT ALL
1. LITTLE INTEREST OR PLEASURE IN DOING THINGS: NOT AT ALL
2. FEELING DOWN, DEPRESSED OR HOPELESS: NOT AT ALL
SUM OF ALL RESPONSES TO PHQ QUESTIONS 1-9: 0
SUM OF ALL RESPONSES TO PHQ QUESTIONS 1-9: 0

## 2025-06-10 ENCOUNTER — OFFICE VISIT (OUTPATIENT)
Dept: PRIMARY CARE CLINIC | Age: 75
End: 2025-06-10
Payer: MEDICARE

## 2025-06-10 VITALS
OXYGEN SATURATION: 96 % | TEMPERATURE: 97.3 F | BODY MASS INDEX: 32.08 KG/M2 | HEART RATE: 75 BPM | WEIGHT: 211 LBS | SYSTOLIC BLOOD PRESSURE: 128 MMHG | DIASTOLIC BLOOD PRESSURE: 84 MMHG | RESPIRATION RATE: 16 BRPM

## 2025-06-10 DIAGNOSIS — E11.9 TYPE 2 DIABETES MELLITUS WITHOUT COMPLICATION, WITHOUT LONG-TERM CURRENT USE OF INSULIN (HCC): Primary | ICD-10-CM

## 2025-06-10 DIAGNOSIS — R06.02 SHORTNESS OF BREATH: ICD-10-CM

## 2025-06-10 DIAGNOSIS — R42 LIGHTHEADEDNESS: ICD-10-CM

## 2025-06-10 LAB — HBA1C MFR BLD: 6.3 %

## 2025-06-10 PROCEDURE — 3017F COLORECTAL CA SCREEN DOC REV: CPT | Performed by: NURSE PRACTITIONER

## 2025-06-10 PROCEDURE — 3074F SYST BP LT 130 MM HG: CPT | Performed by: NURSE PRACTITIONER

## 2025-06-10 PROCEDURE — 3044F HG A1C LEVEL LT 7.0%: CPT | Performed by: NURSE PRACTITIONER

## 2025-06-10 PROCEDURE — 1036F TOBACCO NON-USER: CPT | Performed by: NURSE PRACTITIONER

## 2025-06-10 PROCEDURE — G8427 DOCREV CUR MEDS BY ELIG CLIN: HCPCS | Performed by: NURSE PRACTITIONER

## 2025-06-10 PROCEDURE — G8417 CALC BMI ABV UP PARAM F/U: HCPCS | Performed by: NURSE PRACTITIONER

## 2025-06-10 PROCEDURE — 3079F DIAST BP 80-89 MM HG: CPT | Performed by: NURSE PRACTITIONER

## 2025-06-10 PROCEDURE — 1160F RVW MEDS BY RX/DR IN RCRD: CPT | Performed by: NURSE PRACTITIONER

## 2025-06-10 PROCEDURE — 1159F MED LIST DOCD IN RCRD: CPT | Performed by: NURSE PRACTITIONER

## 2025-06-10 PROCEDURE — 83036 HEMOGLOBIN GLYCOSYLATED A1C: CPT | Performed by: NURSE PRACTITIONER

## 2025-06-10 PROCEDURE — 99214 OFFICE O/P EST MOD 30 MIN: CPT | Performed by: NURSE PRACTITIONER

## 2025-06-10 PROCEDURE — 2022F DILAT RTA XM EVC RTNOPTHY: CPT | Performed by: NURSE PRACTITIONER

## 2025-06-10 PROCEDURE — 1123F ACP DISCUSS/DSCN MKR DOCD: CPT | Performed by: NURSE PRACTITIONER

## 2025-06-10 SDOH — ECONOMIC STABILITY: FOOD INSECURITY: WITHIN THE PAST 12 MONTHS, THE FOOD YOU BOUGHT JUST DIDN'T LAST AND YOU DIDN'T HAVE MONEY TO GET MORE.: NEVER TRUE

## 2025-06-10 SDOH — ECONOMIC STABILITY: FOOD INSECURITY: WITHIN THE PAST 12 MONTHS, YOU WORRIED THAT YOUR FOOD WOULD RUN OUT BEFORE YOU GOT MONEY TO BUY MORE.: NEVER TRUE

## 2025-06-10 ASSESSMENT — ENCOUNTER SYMPTOMS
SHORTNESS OF BREATH: 1
ALLERGIC/IMMUNOLOGIC NEGATIVE: 1
EYES NEGATIVE: 1
GASTROINTESTINAL NEGATIVE: 1

## 2025-06-10 NOTE — PATIENT INSTRUCTIONS
SURVEY:     You may be receiving a survey from [facility] [employer] regarding your visit today.     Please complete the survey to enable us to provide the highest quality of care to you and your family.     If you cannot score us a very good on any question, please call the office to discuss how we could have made your experience a very good one.     Thank you,    Miquel Mart, APRN-CNP  Gabrielle Dang, APRN-CNP  Raysa, LPN  Janel, CMA  Gaudencio, CMA  Larissa, CMA  Vickie, PCA  Kika, CMA  Elena, PM

## 2025-06-10 NOTE — PROGRESS NOTES
performed by Favio Brady MD at [facility] OR    BLADDER SURGERY Left 6/15/2021    CYSTOSCOPY STENT INSERTION/EXCHANGE performed by Favio Brady MD at [facility] OR    CATARACT REMOVAL Left     CATARACT REMOVAL WITH IMPLANT Right 11/22/2021    Dr. Yun     CYSTOSCOPY Left 5/18/2021    CYSTOSCOPY URETEROSCOPY performed by Favio Brady MD at [facility] OR    CYSTOSCOPY Left 06/15/2021    Dr. Brady - HLL, L stent insertion    CYSTOSCOPY Left 6/15/2021    CYSTOSCOPY URETEROSCOPY LASER, HLL performed by Favio Brady MD at [facility] OR    INTRACAPSULAR CATARACT EXTRACTION Right 11/22/2021    EYE CATARACT EMULSIFICATION IOL IMPLANT performed by Tiburcio Yun DO at [facility] OR    INTRACAPSULAR CATARACT EXTRACTION Left 12/6/2021    EYE CATARACT EMULSIFICATION IOL IMPLANT performed by Tiburcio Yun DO at [facility] OR    JOINT REPLACEMENT Left 07/12/2018    knee    SC ARTHRP KNE CONDYLE&PLATU MEDIAL&LAT COMPARTMENTS Left 7/12/2018    KNEE TOTAL ARTHROPLASTY performed by Gregory Chanel MD at [facility] OR        Medications:       Prior to Admission medications    Medication Sig Start Date End Date Taking? Authorizing Provider   acetaminophen (TYLENOL) 500 MG tablet Take 1 tablet by mouth every 6 hours as needed for Pain   Yes Provider, MD Virginie        Allergies:       Patient has no known allergies.    Social History:     Tobacco:    reports that he quit smoking about 25 years ago. His smoking use included cigarettes. He started smoking about 55 years ago. He has a 60 pack-year smoking history. He has never used smokeless tobacco.  Alcohol:      reports no history of alcohol use.  Drug Use:  reports no history of drug use.    Family History:     No family history on file.    Review of Systems:     Positive and Negative as described in HPI    Review of Systems   Constitutional:  Positive for fatigue.   HENT: Negative.     Eyes: Negative.    Respiratory:  Positive for shortness of breath.    Cardiovascular: Negative.  Negative for

## 2025-08-18 ENCOUNTER — HOSPITAL ENCOUNTER (OUTPATIENT)
Age: 75
Discharge: HOME | End: 2025-08-20
Payer: MEDICARE

## 2025-08-18 ENCOUNTER — HOSPITAL ENCOUNTER (OUTPATIENT)
Dept: NUCLEAR MEDICINE | Age: 75
Discharge: HOME | End: 2025-08-20
Payer: MEDICARE

## 2025-08-18 VITALS
SYSTOLIC BLOOD PRESSURE: 128 MMHG | HEIGHT: 68 IN | BODY MASS INDEX: 31.98 KG/M2 | DIASTOLIC BLOOD PRESSURE: 84 MMHG | WEIGHT: 211 LBS

## 2025-08-18 DIAGNOSIS — R42 LIGHTHEADEDNESS: ICD-10-CM

## 2025-08-18 DIAGNOSIS — R06.02 SHORTNESS OF BREATH: ICD-10-CM

## 2025-08-18 LAB
ECHO AO ASC DIAM: 2.9 CM
ECHO AO ASCENDING AORTA INDEX: 1.39 CM/M2
ECHO AO SINUS VALSALVA DIAM: 3.2 CM
ECHO AO SINUS VALSALVA INDEX: 1.53 CM/M2
ECHO AO ST JNCT DIAM: 2.4 CM
ECHO AV CUSP MM: 2.3 CM
ECHO AV MEAN GRADIENT: 4 MMHG
ECHO AV MEAN VELOCITY: 0.8 M/S
ECHO AV PEAK GRADIENT: 6 MMHG
ECHO AV PEAK VELOCITY: 1.3 M/S
ECHO AV VELOCITY RATIO: 0.38
ECHO AV VTI: 27.8 CM
ECHO BSA: 2.14 M2
ECHO EST RA PRESSURE: 3 MMHG
ECHO LA AREA 2C: 22.3 CM2
ECHO LA AREA 4C: 19.7 CM2
ECHO LA MAJOR AXIS: 5.7 CM
ECHO LA MINOR AXIS: 5.9 CM
ECHO LA VOL BP: 64 ML (ref 18–58)
ECHO LA VOL MOD A2C: 70 ML (ref 18–58)
ECHO LA VOL MOD A4C: 58 ML (ref 18–58)
ECHO LA VOL/BSA BIPLANE: 31 ML/M2 (ref 16–34)
ECHO LA VOLUME INDEX MOD A2C: 33 ML/M2 (ref 16–34)
ECHO LA VOLUME INDEX MOD A4C: 28 ML/M2 (ref 16–34)
ECHO LV E' LATERAL VELOCITY: 12 CM/S
ECHO LV EDV A2C: 70 ML
ECHO LV EDV A4C: 89 ML
ECHO LV EDV INDEX A4C: 43 ML/M2
ECHO LV EDV NDEX A2C: 33 ML/M2
ECHO LV EF PHYSICIAN: 55 %
ECHO LV EJECTION FRACTION A2C: 59 %
ECHO LV EJECTION FRACTION A4C: 57 %
ECHO LV EJECTION FRACTION BIPLANE: 58 % (ref 55–100)
ECHO LV ESV A2C: 29 ML
ECHO LV ESV A4C: 38 ML
ECHO LV ESV INDEX A2C: 14 ML/M2
ECHO LV ESV INDEX A4C: 18 ML/M2
ECHO LV FRACTIONAL SHORTENING: 35 % (ref 28–44)
ECHO LV INTERNAL DIMENSION DIASTOLE INDEX: 2.34 CM/M2
ECHO LV INTERNAL DIMENSION DIASTOLIC: 4.9 CM (ref 4.2–5.9)
ECHO LV INTERNAL DIMENSION SYSTOLIC INDEX: 1.53 CM/M2
ECHO LV INTERNAL DIMENSION SYSTOLIC: 3.2 CM
ECHO LV IVSD: 1.1 CM (ref 0.6–1)
ECHO LV MASS 2D: 188.1 G (ref 88–224)
ECHO LV MASS INDEX 2D: 90 G/M2 (ref 49–115)
ECHO LV POSTERIOR WALL DIASTOLIC: 1 CM (ref 0.6–1)
ECHO LV RELATIVE WALL THICKNESS RATIO: 0.41
ECHO LVOT AV VTI INDEX: 0.46
ECHO LVOT MEAN GRADIENT: 1 MMHG
ECHO LVOT PEAK GRADIENT: 1 MMHG
ECHO LVOT PEAK VELOCITY: 0.5 M/S
ECHO LVOT VTI: 12.8 CM
ECHO MV A VELOCITY: 0.59 M/S
ECHO MV E DECELERATION TIME (DT): 218 MS
ECHO MV E VELOCITY: 0.84 M/S
ECHO MV E/A RATIO: 1.42
ECHO MV E/E' LATERAL: 7
ECHO PV MAX VELOCITY: 0.9 M/S
ECHO PV PEAK GRADIENT: 4 MMHG
ECHO RIGHT VENTRICULAR SYSTOLIC PRESSURE (RVSP): 23 MMHG
ECHO RV BASAL DIMENSION: 3.4 CM
ECHO TV REGURGITANT MAX VELOCITY: 2.21 M/S
ECHO TV REGURGITANT PEAK GRADIENT: 20 MMHG

## 2025-08-18 PROCEDURE — 93306 TTE W/DOPPLER COMPLETE: CPT | Performed by: FAMILY MEDICINE

## 2025-08-18 PROCEDURE — 93017 CV STRESS TEST TRACING ONLY: CPT

## 2025-08-18 PROCEDURE — 93306 TTE W/DOPPLER COMPLETE: CPT

## 2025-08-18 PROCEDURE — A9500 TC99M SESTAMIBI: HCPCS | Performed by: NURSE PRACTITIONER

## 2025-08-18 PROCEDURE — 3430000000 HC RX DIAGNOSTIC RADIOPHARMACEUTICAL: Performed by: NURSE PRACTITIONER

## 2025-08-18 RX ORDER — TETRAKIS(2-METHOXYISOBUTYLISOCYANIDE)COPPER(I) TETRAFLUOROBORATE 1 MG/ML
30 INJECTION, POWDER, LYOPHILIZED, FOR SOLUTION INTRAVENOUS
Status: COMPLETED | OUTPATIENT
Start: 2025-08-18 | End: 2025-08-18

## 2025-08-18 RX ADMIN — Medication 30 MILLICURIE: at 11:01

## 2025-08-19 ENCOUNTER — HOSPITAL ENCOUNTER (OUTPATIENT)
Dept: NUCLEAR MEDICINE | Age: 75
Discharge: HOME | End: 2025-08-21
Payer: MEDICARE

## 2025-08-19 ENCOUNTER — RESULTS FOLLOW-UP (OUTPATIENT)
Dept: PRIMARY CARE CLINIC | Age: 75
End: 2025-08-19

## 2025-08-19 DIAGNOSIS — R06.02 SHORTNESS OF BREATH: ICD-10-CM

## 2025-08-19 DIAGNOSIS — R94.39 ABNORMAL CARDIOVASCULAR STRESS TEST: Primary | ICD-10-CM

## 2025-08-19 LAB
NUC STRESS EJECTION FRACTION: 62 %
STRESS BASELINE DIAS BP: 74 MMHG
STRESS BASELINE HR: 51 BPM
STRESS BASELINE ST DEPRESSION: 0 MM
STRESS BASELINE SYS BP: 132 MMHG
STRESS ESTIMATED WORKLOAD: 7.4 METS
STRESS EXERCISE DUR MIN: 6 MIN
STRESS EXERCISE DUR SEC: 41 SEC
STRESS PEAK DIAS BP: 80 MMHG
STRESS PEAK SYS BP: 144 MMHG
STRESS PERCENT HR ACHIEVED: 90 %
STRESS POST PEAK HR: 130 BPM
STRESS RATE PRESSURE PRODUCT: NORMAL BPM*MMHG
STRESS ST DEPRESSION: 1.5 MM
STRESS TARGET HR: 145 BPM
TID: 0.95

## 2025-08-19 PROCEDURE — 93016 CV STRESS TEST SUPVJ ONLY: CPT | Performed by: INTERNAL MEDICINE

## 2025-08-19 PROCEDURE — 78452 HT MUSCLE IMAGE SPECT MULT: CPT | Performed by: INTERNAL MEDICINE

## 2025-08-19 PROCEDURE — 3430000000 HC RX DIAGNOSTIC RADIOPHARMACEUTICAL: Performed by: NURSE PRACTITIONER

## 2025-08-19 PROCEDURE — A9500 TC99M SESTAMIBI: HCPCS | Performed by: NURSE PRACTITIONER

## 2025-08-19 PROCEDURE — 93018 CV STRESS TEST I&R ONLY: CPT | Performed by: INTERNAL MEDICINE

## 2025-08-19 RX ORDER — TETRAKIS(2-METHOXYISOBUTYLISOCYANIDE)COPPER(I) TETRAFLUOROBORATE 1 MG/ML
30 INJECTION, POWDER, LYOPHILIZED, FOR SOLUTION INTRAVENOUS
Status: COMPLETED | OUTPATIENT
Start: 2025-08-19 | End: 2025-08-19

## 2025-08-19 RX ADMIN — Medication 30 MILLICURIE: at 13:16

## 2025-08-21 DIAGNOSIS — I10 ESSENTIAL HYPERTENSION: Primary | ICD-10-CM

## 2025-08-25 ENCOUNTER — APPOINTMENT (OUTPATIENT)
Age: 75
End: 2025-08-25
Payer: MEDICARE

## 2025-08-25 ENCOUNTER — HOSPITAL ENCOUNTER (OUTPATIENT)
Dept: PULMONOLOGY | Age: 75
Discharge: HOME | End: 2025-08-25
Payer: MEDICARE

## 2025-08-25 ENCOUNTER — HOSPITAL ENCOUNTER (OUTPATIENT)
Dept: NON INVASIVE DIAGNOSTICS | Age: 75
Discharge: HOME | End: 2025-08-25
Payer: MEDICARE

## 2025-08-25 DIAGNOSIS — I10 ESSENTIAL HYPERTENSION: ICD-10-CM

## 2025-08-25 DIAGNOSIS — R06.02 SHORTNESS OF BREATH: ICD-10-CM

## 2025-08-25 LAB
EKG ATRIAL RATE: 56 BPM
EKG P AXIS: 29 DEGREES
EKG P-R INTERVAL: 136 MS
EKG Q-T INTERVAL: 420 MS
EKG QRS DURATION: 86 MS
EKG QTC CALCULATION (BAZETT): 405 MS
EKG R AXIS: 47 DEGREES
EKG T AXIS: 49 DEGREES
EKG VENTRICULAR RATE: 56 BPM

## 2025-08-25 PROCEDURE — 94060 EVALUATION OF WHEEZING: CPT

## 2025-08-25 PROCEDURE — 93005 ELECTROCARDIOGRAM TRACING: CPT

## 2025-08-25 PROCEDURE — 94664 DEMO&/EVAL PT USE INHALER: CPT

## 2025-08-25 PROCEDURE — 93010 ELECTROCARDIOGRAM REPORT: CPT | Performed by: FAMILY MEDICINE

## 2025-08-25 PROCEDURE — 94726 PLETHYSMOGRAPHY LUNG VOLUMES: CPT

## 2025-08-25 PROCEDURE — 94729 DIFFUSING CAPACITY: CPT

## 2025-08-25 PROCEDURE — 6370000000 HC RX 637 (ALT 250 FOR IP): Performed by: INTERNAL MEDICINE

## 2025-08-25 RX ORDER — ALBUTEROL SULFATE 90 UG/1
4 INHALANT RESPIRATORY (INHALATION) ONCE
Status: COMPLETED | OUTPATIENT
Start: 2025-08-25 | End: 2025-08-25

## 2025-08-25 RX ADMIN — ALBUTEROL SULFATE 4 PUFF: 90 AEROSOL, METERED RESPIRATORY (INHALATION) at 13:36

## 2025-08-28 ENCOUNTER — HOSPITAL ENCOUNTER (OUTPATIENT)
Dept: LAB | Age: 75
Discharge: HOME | End: 2025-08-28
Payer: MEDICARE

## 2025-08-28 DIAGNOSIS — I10 ESSENTIAL HYPERTENSION: ICD-10-CM

## 2025-08-28 LAB
CHOLEST SERPL-MCNC: 154 MG/DL (ref 0–199)
CHOLESTEROL/HDL RATIO: 3.5
HDLC SERPL-MCNC: 44 MG/DL
LDLC SERPL CALC-MCNC: 96 MG/DL (ref 0–100)
TRIGL SERPL-MCNC: 72 MG/DL
VLDLC SERPL CALC-MCNC: 14 MG/DL (ref 1–30)

## 2025-08-28 PROCEDURE — 36415 COLL VENOUS BLD VENIPUNCTURE: CPT

## 2025-08-28 PROCEDURE — 80061 LIPID PANEL: CPT

## 2025-09-02 ENCOUNTER — OFFICE VISIT (OUTPATIENT)
Dept: CARDIOLOGY CLINIC | Age: 75
End: 2025-09-02
Payer: MEDICARE

## 2025-09-02 VITALS
DIASTOLIC BLOOD PRESSURE: 75 MMHG | SYSTOLIC BLOOD PRESSURE: 178 MMHG | BODY MASS INDEX: 31.48 KG/M2 | HEART RATE: 70 BPM | OXYGEN SATURATION: 95 % | WEIGHT: 207 LBS

## 2025-09-02 DIAGNOSIS — R06.09 DOE (DYSPNEA ON EXERTION): Primary | ICD-10-CM

## 2025-09-02 DIAGNOSIS — R94.39 ABNORMAL NUCLEAR STRESS TEST: ICD-10-CM

## 2025-09-02 DIAGNOSIS — R06.02 SHORTNESS OF BREATH: ICD-10-CM

## 2025-09-02 DIAGNOSIS — R42 LIGHTHEADEDNESS: ICD-10-CM

## 2025-09-02 PROCEDURE — 1123F ACP DISCUSS/DSCN MKR DOCD: CPT | Performed by: INTERNAL MEDICINE

## 2025-09-02 PROCEDURE — 1036F TOBACCO NON-USER: CPT | Performed by: INTERNAL MEDICINE

## 2025-09-02 PROCEDURE — 99204 OFFICE O/P NEW MOD 45 MIN: CPT | Performed by: INTERNAL MEDICINE

## 2025-09-02 PROCEDURE — G8428 CUR MEDS NOT DOCUMENT: HCPCS | Performed by: INTERNAL MEDICINE

## 2025-09-02 PROCEDURE — 3017F COLORECTAL CA SCREEN DOC REV: CPT | Performed by: INTERNAL MEDICINE

## 2025-09-02 PROCEDURE — G8417 CALC BMI ABV UP PARAM F/U: HCPCS | Performed by: INTERNAL MEDICINE

## 2025-09-02 PROCEDURE — 3078F DIAST BP <80 MM HG: CPT | Performed by: INTERNAL MEDICINE

## 2025-09-02 PROCEDURE — 3077F SYST BP >= 140 MM HG: CPT | Performed by: INTERNAL MEDICINE

## (undated) DEVICE — 3 BONE CEMENT MIXER WITH SMALL SPATULA: Brand: MIXEVAC

## (undated) DEVICE — CONTROL SYRINGE LUER-LOCK TIP: Brand: MONOJECT

## (undated) DEVICE — GLOVE ORANGE PI 7 1/2   MSG9075

## (undated) DEVICE — SOFT SHIELD® COLLAGEN SHIELD, 12 HOURS (CE): Brand: SOFT SHIELD® COLLAGEN SHIELDS

## (undated) DEVICE — SINGLE ACTION PUMPING SYSTEM

## (undated) DEVICE — AMVISC PLUS  0.8ML: Brand: AMVISC PLUS

## (undated) DEVICE — AIRLIFE™ NASAL OXYGEN CANNULA CURVED, FLARED TIP, WITH 7 FEET (2.1 M) CRUSH RESISTANT TUBING, OVER-THE-EAR STYLE: Brand: AIRLIFE™

## (undated) DEVICE — SOLUTION IV IRRIG WATER 1000ML POUR BRL 2F7114

## (undated) DEVICE — GUIDEWIRE VASC STR 3 CM 0.035 INX150 CM STD NIT ZIPWIRE

## (undated) DEVICE — Device

## (undated) DEVICE — DUAL LUMEN URETERAL CATHETER

## (undated) DEVICE — PENCIL ES L3M BTTN SWCH HOLSTER W/ BLDE ELECTRD EDGE

## (undated) DEVICE — BLADE 30D THK3.4MM 10.5X1.9MM ANG STAB

## (undated) DEVICE — ZIPPERED TOGA, X-LARGE: Brand: FLYTE, SURGICOOL

## (undated) DEVICE — GOWN,AURORA,NON-REINFORCED,2XL: Brand: MEDLINE

## (undated) DEVICE — STERILE PATIENT PROTECTIVE PAD FOR IMP® KNEE POSITIONERS & COHESIVE WRAP (10 / CASE): Brand: DE MAYO KNEE POSITIONER®

## (undated) DEVICE — ADAPTER URO SCP UROLOK LL

## (undated) DEVICE — Z DISCONTINUED BY MEDLINE USE 2711682 TRAY SKIN PREP DRY W/ PREM GLV

## (undated) DEVICE — NEEDLE SPNL 20GA L3.5IN YEL HUB S STL REG WALL FIT STYL W/

## (undated) DEVICE — SYRINGE ONLY,20ML LUER LOCK: Brand: MEDLINE INDUSTRIES, INC.

## (undated) DEVICE — SOLUTION IV IRRIG POUR BRL 0.9% SODIUM CHL 2F7124

## (undated) DEVICE — KNIFE OPHTH DIA22MM 45DEG SLT W HNDL SHRP ANG PNT DEL DBL

## (undated) DEVICE — TUBE IRRIG HNDPC HI FLO TP INTRPULS W/SUCTION TUBE

## (undated) DEVICE — GLOVE SURG SZ 85 L12IN FNGR THK87MIL WHT LTX FREE

## (undated) DEVICE — SYRINGE MED 20ML STD CLR PLAS LUERLOCK TIP N CTRL DISP

## (undated) DEVICE — DUAL CUT SAGITTAL BLADE

## (undated) DEVICE — 3M™ IOBAN™ 2 ANTIMICROBIAL INCISE DRAPE 6650EZ: Brand: IOBAN™ 2

## (undated) DEVICE — BANDAGE COMPR W6INXL12FT SMOOTH FOR LIMB EXSANG ESMARCH

## (undated) DEVICE — DRESSING TRNSPAR FLM 2.4X2.8IN SURESITE 123

## (undated) DEVICE — Z DISCONTINUED PER MEDLINE USE 2741943 DRESSING AQUACEL 10 IN ALG W9XL25CM SIL CVR WTRPRF VIR BACT BARR ANTIMIC

## (undated) DEVICE — STAPLER SKIN H3.9MM WIRE DIA0.58MM CRWN 6.9MM 35 STPL FIX

## (undated) DEVICE — Z INACTIVE USE 2660664 SOLUTION IRRIG 3000ML 0.9% SOD CHL USP UROMATIC PLAS CONT

## (undated) DEVICE — SUTURE VCRL SZ 2-0 L36IN ABSRB UD L40MM CT 1/2 CIR J957H

## (undated) DEVICE — MARKER,SKIN,WI/RULER AND LABELS: Brand: MEDLINE

## (undated) DEVICE — 3M™ STERI-DRAPE™ U-DRAPE 1015: Brand: STERI-DRAPE™

## (undated) DEVICE — GLOVE SURG SZ 65 L12IN FNGR THK87MIL WHT LTX FREE

## (undated) DEVICE — SOLUTION IV 100ML 0.9% SOD CHL PLAS CONT USP VIAFLX 1 PER

## (undated) DEVICE — SUTURE TICRN BR BL NDL C-20 SZ 5 30 8886302779

## (undated) DEVICE — BETADINE 5% EYE SOL

## (undated) DEVICE — GLOVE SURG SZ 7 L12IN FNGR THK87MIL WHT LTX FREE

## (undated) DEVICE — SOLUTION SURG PREP ANTIMICROBIAL 4 OZ SKIN WND EXIDINE

## (undated) DEVICE — URETERAL ACCESS SHEATH SET: Brand: NAVIGATOR

## (undated) DEVICE — ULTRACLEAN ACCESSORY ELECTRODE 4" (10.16 CM) COATED BLADE: Brand: ULTRACLEAN

## (undated) DEVICE — Device: Brand: ALLEGRO 1X SILICONE I/A HANDPIECE (6)

## (undated) DEVICE — SOLUTION IV IRRIG 0.9% NACL 3000ML BAG 2B7477

## (undated) DEVICE — DRAPE,U/ SHT,SPLIT,PLAS,STERIL: Brand: MEDLINE

## (undated) DEVICE — THREE-QUARTER SHEET: Brand: CONVERTORS

## (undated) DEVICE — BANDAGE COMPR M W6INXL10YD WHT BGE VELC E MTRX HK AND LOOP

## (undated) DEVICE — GLOVE SURG SZ 85 L12IN FNGR THK87MIL DK GRN LTX FREE ISOLEX

## (undated) DEVICE — SPONGE GZ W4XL4IN CELOS POSTOP AVANT

## (undated) DEVICE — CUFF TOURNIQUET 34 SNG BLADDER DUAL PORT

## (undated) DEVICE — Device: Brand: MALYUGIN RING SYSTEM 7.0MM

## (undated) DEVICE — PEN: MARKING STD 100/CS: Brand: MEDICAL ACTION INDUSTRIES

## (undated) DEVICE — COVER,TABLE,HEAVY DUTY,77"X90",STRL: Brand: MEDLINE

## (undated) DEVICE — KIT EVAC 0.13IN RECT TB DIA10FR 400CC PVC 3 SPR Y CONN DRN

## (undated) DEVICE — SUTURE VCRL + ANTIBACT NO 1 CTX 27IN ABSRB BRAID VLT VCP365H

## (undated) DEVICE — T4 ZIPPER TOGA, (L/XL)